# Patient Record
Sex: MALE | Race: WHITE | Employment: UNEMPLOYED | URBAN - METROPOLITAN AREA
[De-identification: names, ages, dates, MRNs, and addresses within clinical notes are randomized per-mention and may not be internally consistent; named-entity substitution may affect disease eponyms.]

---

## 2019-09-08 VITALS
DIASTOLIC BLOOD PRESSURE: 91 MMHG | HEART RATE: 103 BPM | WEIGHT: 220 LBS | BODY MASS INDEX: 27.35 KG/M2 | SYSTOLIC BLOOD PRESSURE: 138 MMHG | RESPIRATION RATE: 17 BRPM | HEIGHT: 75 IN | OXYGEN SATURATION: 99 % | TEMPERATURE: 97.8 F

## 2019-09-08 ASSESSMENT — PAIN SCALES - GENERAL: PAINLEVEL_OUTOF10: 8

## 2019-09-09 ENCOUNTER — HOSPITAL ENCOUNTER (EMERGENCY)
Age: 24
Discharge: HOME | End: 2019-09-09

## 2019-09-09 DIAGNOSIS — K02.9 PAIN DUE TO DENTAL CARIES: Primary | ICD-10-CM

## 2019-09-09 PROCEDURE — 6370000000 HC RX 637 (ALT 250 FOR IP): Performed by: PHYSICIAN ASSISTANT

## 2019-09-09 PROCEDURE — 99282 EMERGENCY DEPT VISIT SF MDM: CPT

## 2019-09-09 RX ORDER — NAPROXEN 500 MG/1
500 TABLET ORAL 2 TIMES DAILY
Qty: 60 TABLET | Refills: 0 | Status: SHIPPED | OUTPATIENT
Start: 2019-09-09

## 2019-09-09 RX ORDER — PENICILLIN V POTASSIUM 500 MG/1
500 TABLET ORAL ONCE
Status: COMPLETED | OUTPATIENT
Start: 2019-09-09 | End: 2019-09-09

## 2019-09-09 RX ORDER — PENICILLIN V POTASSIUM 500 MG/1
500 TABLET ORAL 4 TIMES DAILY
Qty: 40 TABLET | Refills: 0 | Status: SHIPPED | OUTPATIENT
Start: 2019-09-09 | End: 2019-09-19

## 2019-09-09 RX ORDER — LIDOCAINE HYDROCHLORIDE 20 MG/ML
5 SOLUTION OROPHARYNGEAL PRN
Qty: 100 ML | Refills: 0 | Status: SHIPPED | OUTPATIENT
Start: 2019-09-09 | End: 2021-02-08

## 2019-09-09 RX ORDER — CHLORHEXIDINE GLUCONATE 0.12 MG/ML
15 RINSE ORAL 2 TIMES DAILY
Qty: 420 ML | Refills: 0 | Status: SHIPPED | OUTPATIENT
Start: 2019-09-09 | End: 2019-09-23

## 2019-09-09 RX ORDER — TRAMADOL HYDROCHLORIDE 50 MG/1
50 TABLET ORAL EVERY 4 HOURS PRN
Qty: 10 TABLET | Refills: 0 | Status: SHIPPED | OUTPATIENT
Start: 2019-09-09 | End: 2019-09-12

## 2019-09-09 RX ORDER — TRAMADOL HYDROCHLORIDE 50 MG/1
50 TABLET ORAL ONCE
Status: COMPLETED | OUTPATIENT
Start: 2019-09-09 | End: 2019-09-09

## 2019-09-09 RX ADMIN — TRAMADOL HYDROCHLORIDE 50 MG: 50 TABLET, FILM COATED ORAL at 01:41

## 2019-09-09 RX ADMIN — PENICILLIN V POTASIUM 500 MG: 500 TABLET OROPHARYNGEAL at 01:41

## 2019-09-09 ASSESSMENT — PAIN DESCRIPTION - FREQUENCY: FREQUENCY: CONTINUOUS

## 2019-09-09 ASSESSMENT — PAIN DESCRIPTION - LOCATION: LOCATION: FACE;JAW

## 2019-09-09 ASSESSMENT — PAIN DESCRIPTION - PAIN TYPE: TYPE: ACUTE PAIN

## 2019-09-09 ASSESSMENT — PAIN SCALES - GENERAL
PAINLEVEL_OUTOF10: 9
PAINLEVEL_OUTOF10: 9

## 2019-09-09 ASSESSMENT — PAIN DESCRIPTION - PROGRESSION: CLINICAL_PROGRESSION: NOT CHANGED

## 2019-09-09 ASSESSMENT — PAIN DESCRIPTION - ORIENTATION: ORIENTATION: RIGHT;LOWER;UPPER

## 2019-09-09 ASSESSMENT — PAIN DESCRIPTION - ONSET: ONSET: AWAKENED FROM SLEEP

## 2019-09-09 ASSESSMENT — PAIN DESCRIPTION - DESCRIPTORS: DESCRIPTORS: CONSTANT

## 2019-09-09 NOTE — ED TRIAGE NOTES
Pt presents to ED with c/o left sided facial pain. Pt states the pain started in his gums and now his face hurts.

## 2019-09-09 NOTE — ED PROVIDER NOTES
eMERGENCY dEPARTMENT eNCOUnter      PCP: Flo Srivastava MD    [facility]    Chief Complaint   Patient presents with    Facial Pain     Pt was not seen by physician    HPI    Shameka Higginbotham is a 26 y.o. male who presents with left upper gingival and dental pain ongoing the last 2 to 3 days. Patient reports that he was seen in the dental clinic a few months ago and told that he would need to have a couple teeth to the left upper jaw was removed. He was placed on antibiotics at that time. Never followed up. Denies fever, facial redness or swelling. REVIEW OF SYSTEMS    General: Denies Fever, Denies Chills, Denies syncope  ENT:  No tongue or lip swelling, No difficulty swallowing,   Respiratory: Denies difficulty breathing, wheezes. GI: Denies nausea, vomiting. Lymphatics:  No swollen nodes, red streaks  Skin:  See hpi. No rash. All other review of systems are negative  See HPI and nursing notes for additional information     PAST MEDICAL & SURGICAL HISTORY    Past Medical History:   Diagnosis Date    Asthma      History reviewed. No pertinent surgical history. CURRENT MEDICATIONS    Current Outpatient Rx   Medication Sig Dispense Refill    ondansetron (ZOFRAN ODT) 4 MG disintegrating tablet Take 1 tablet by mouth every 6 hours 10 tablet 0    acetaminophen (APAP EXTRA STRENGTH) 500 MG tablet Take 1 tablet by mouth every 6 hours as needed for Pain 30 tablet 0    omeprazole (PRILOSEC) 20 MG capsule Take 1 capsule by mouth daily for 30 days. 30 capsule 0    ALBUTEROL IN Inhale  into the lungs.          ALLERGIES    No Known Allergies    SOCIAL & FAMILY HISTORY    Social History     Socioeconomic History    Marital status: Single     Spouse name: None    Number of children: None    Years of education: None    Highest education level: None   Occupational History    None   Social Needs    Financial resource strain: None    Food insecurity:     Worry: None     Inability: None    Transportation needs:     Medical: None     Non-medical: None   Tobacco Use    Smoking status: Current Every Day Smoker     Packs/day: 0.50     Types: Cigarettes    Smokeless tobacco: Never Used   Substance and Sexual Activity    Alcohol use: No    Drug use: No    Sexual activity: None   Lifestyle    Physical activity:     Days per week: None     Minutes per session: None    Stress: None   Relationships    Social connections:     Talks on phone: None     Gets together: None     Attends Church service: None     Active member of club or organization: None     Attends meetings of clubs or organizations: None     Relationship status: None    Intimate partner violence:     Fear of current or ex partner: None     Emotionally abused: None     Physically abused: None     Forced sexual activity: None   Other Topics Concern    None   Social History Narrative    None     History reviewed. No pertinent family history. PHYSICAL EXAM    VITAL SIGNS: BP (!) 138/91   Pulse 103   Temp 97.8 °F (36.6 °C) (Oral)   Resp 17   Ht 6' 3\" (1.905 m)   Wt 220 lb (99.8 kg)   SpO2 99%   BMI 27.50 kg/m²    Constitutional:  Well developed, well nourished, no acute distress   HENT:  Atraumatic, moist mucus membranes. EOMI. No proptosis. Ear canals and TMs clear bilateral.  There is no maxillary sinus tenderness to percussion or redness/warmth. Neck/Lymphatics: supple, no JVD, no swollen nodes   Musculoskeletal:  No edema, no deformities  Integument:  Skin warm and dry, no petechiae   Neurologic:  Alert & oriented, no slurred speech  Psych: Pleasant affect, no hallucinations    Dental:   Left  Lower / Upper  molars with evidence of caries, decay. Gingival buccal interface without obvious mass or discoloration, or fluctuance to palpation. No sublingual swelling or masses   No submandibular swelling. No facial swelling or redness  Oropharynx:    Posterior pharynx without swelling, tonsillar hypertrophy.   No sublingual swelling. ED COURSE & MEDICAL DECISION MAKING       Vital signs and nursing notes reviewed during ED course. I have independently evaluated this patient . Supervising MD present in the Emergency Department, available for consultation, throughout entirety of  patient care. All pertinent Lab data and radiographic results reviewed with patient at bedside. The patient and/or the family were informed of the results of any tests/labs/imaging, the treatment plan, and time was allotted to answer questions. Differential Diagnosis: dental pain, dental abscess, caries, peridontal disease, necrotic teeth, Ludwigs angina, bacteremia/spesis        Clinical  IMPRESSION    1. Pain due to dental caries        There are no signs of abscess or facial cellulitis on exam today. I recommend patient to follow up with dentist soon as possible. Today I provided patient with a prescription of Ultram &  Amoxicillin / Clindamycin. I also provided viscous lidocaine for topical application with cotton balls. I also provided Peridex oral solution. Return to emergency Department warning signs discussed in detail with patient who understands and agrees, including but not limited to difficulty swallowing, increased jaw swelling, fever, increased pain, or any new symptoms. Comment: Please note this report has been produced using speech recognition software and may contain errors related to that system including errors in grammar, punctuation, and spelling, as well as words and phrases that may be inappropriate. If there are any questions or concerns please feel free to contact the dictating provider for clarification.       Carmelina Bearden PA-C  09/09/19 6608

## 2020-11-05 ENCOUNTER — HOSPITAL ENCOUNTER (EMERGENCY)
Age: 25
Discharge: PSYCHIATRIC OR SUBSTANCE USE FACILITY | End: 2020-11-06
Attending: EMERGENCY MEDICINE
Payer: MEDICAID

## 2020-11-05 DIAGNOSIS — R45.851 SUICIDAL IDEATION: ICD-10-CM

## 2020-11-05 DIAGNOSIS — T39.1X2A TYLENOL OVERDOSE, INTENTIONAL SELF-HARM, INITIAL ENCOUNTER (HCC): Primary | ICD-10-CM

## 2020-11-05 DIAGNOSIS — E87.1 HYPONATREMIA: ICD-10-CM

## 2020-11-05 DIAGNOSIS — R73.9 HYPERGLYCEMIA: ICD-10-CM

## 2020-11-05 LAB
ACETAMINOPHEN LEVEL: 28.6 UG/ML (ref 15–30)
ACETAMINOPHEN LEVEL: 70.2 UG/ML (ref 15–30)
ALBUMIN SERPL-MCNC: 3.9 GM/DL (ref 3.4–5)
ALCOHOL SCREEN SERUM: <0.01 %WT/VOL
ALP BLD-CCNC: 108 IU/L (ref 40–129)
ALT SERPL-CCNC: 10 U/L (ref 10–40)
AMPHETAMINES: NEGATIVE
ANION GAP SERPL CALCULATED.3IONS-SCNC: 14 MMOL/L (ref 4–16)
ANION GAP SERPL CALCULATED.3IONS-SCNC: 15 MMOL/L (ref 4–16)
AST SERPL-CCNC: 9 IU/L (ref 15–37)
BARBITURATE SCREEN URINE: NEGATIVE
BENZODIAZEPINE SCREEN, URINE: NEGATIVE
BILIRUB SERPL-MCNC: 0.5 MG/DL (ref 0–1)
BUN BLDV-MCNC: 11 MG/DL (ref 6–23)
BUN BLDV-MCNC: 13 MG/DL (ref 6–23)
CALCIUM SERPL-MCNC: 9 MG/DL (ref 8.3–10.6)
CALCIUM SERPL-MCNC: 9 MG/DL (ref 8.3–10.6)
CANNABINOID SCREEN URINE: NEGATIVE
CHLORIDE BLD-SCNC: 88 MMOL/L (ref 99–110)
CHLORIDE BLD-SCNC: 94 MMOL/L (ref 99–110)
CO2: 21 MMOL/L (ref 21–32)
CO2: 21 MMOL/L (ref 21–32)
COCAINE METABOLITE: NEGATIVE
CREAT SERPL-MCNC: 0.7 MG/DL (ref 0.9–1.3)
CREAT SERPL-MCNC: 0.7 MG/DL (ref 0.9–1.3)
DOSE AMOUNT: ABNORMAL
DOSE AMOUNT: ABNORMAL
DOSE AMOUNT: NORMAL
DOSE TIME: ABNORMAL
DOSE TIME: ABNORMAL
DOSE TIME: NORMAL
GFR AFRICAN AMERICAN: >60 ML/MIN/1.73M2
GFR AFRICAN AMERICAN: >60 ML/MIN/1.73M2
GFR NON-AFRICAN AMERICAN: >60 ML/MIN/1.73M2
GFR NON-AFRICAN AMERICAN: >60 ML/MIN/1.73M2
GLUCOSE BLD-MCNC: 363 MG/DL (ref 70–99)
GLUCOSE BLD-MCNC: 426 MG/DL (ref 70–99)
HCG QUALITATIVE: NEGATIVE
HCT VFR BLD CALC: 46.7 % (ref 42–52)
HEMOGLOBIN: 16.7 GM/DL (ref 13.5–18)
MCH RBC QN AUTO: 29.5 PG (ref 27–31)
MCHC RBC AUTO-ENTMCNC: 35.8 % (ref 32–36)
MCV RBC AUTO: 82.5 FL (ref 78–100)
OPIATES, URINE: NEGATIVE
OXYCODONE: NEGATIVE
PDW BLD-RTO: 11.3 % (ref 11.7–14.9)
PHENCYCLIDINE, URINE: NEGATIVE
PLATELET # BLD: 303 K/CU MM (ref 140–440)
PMV BLD AUTO: 10.3 FL (ref 7.5–11.1)
POTASSIUM SERPL-SCNC: 3.7 MMOL/L (ref 3.5–5.1)
POTASSIUM SERPL-SCNC: 3.8 MMOL/L (ref 3.5–5.1)
PRO-BNP: 37.11 PG/ML
RBC # BLD: 5.66 M/CU MM (ref 4.6–6.2)
SALICYLATE LEVEL: <0.3 MG/DL (ref 15–30)
SODIUM BLD-SCNC: 124 MMOL/L (ref 135–145)
SODIUM BLD-SCNC: 129 MMOL/L (ref 135–145)
TOTAL PROTEIN: 7.1 GM/DL (ref 6.4–8.2)
WBC # BLD: 18 K/CU MM (ref 4–10.5)

## 2020-11-05 PROCEDURE — 93005 ELECTROCARDIOGRAM TRACING: CPT | Performed by: EMERGENCY MEDICINE

## 2020-11-05 PROCEDURE — 84703 CHORIONIC GONADOTROPIN ASSAY: CPT

## 2020-11-05 PROCEDURE — 80053 COMPREHEN METABOLIC PANEL: CPT

## 2020-11-05 PROCEDURE — G0480 DRUG TEST DEF 1-7 CLASSES: HCPCS

## 2020-11-05 PROCEDURE — 80307 DRUG TEST PRSMV CHEM ANLYZR: CPT

## 2020-11-05 PROCEDURE — 83880 ASSAY OF NATRIURETIC PEPTIDE: CPT

## 2020-11-05 PROCEDURE — 80048 BASIC METABOLIC PNL TOTAL CA: CPT

## 2020-11-05 PROCEDURE — 85027 COMPLETE CBC AUTOMATED: CPT

## 2020-11-05 PROCEDURE — U0002 COVID-19 LAB TEST NON-CDC: HCPCS

## 2020-11-05 PROCEDURE — 2580000003 HC RX 258: Performed by: EMERGENCY MEDICINE

## 2020-11-05 PROCEDURE — 99285 EMERGENCY DEPT VISIT HI MDM: CPT

## 2020-11-05 PROCEDURE — 6370000000 HC RX 637 (ALT 250 FOR IP): Performed by: EMERGENCY MEDICINE

## 2020-11-05 RX ORDER — 0.9 % SODIUM CHLORIDE 0.9 %
1000 INTRAVENOUS SOLUTION INTRAVENOUS ONCE
Status: COMPLETED | OUTPATIENT
Start: 2020-11-05 | End: 2020-11-05

## 2020-11-05 RX ORDER — ACTIVATED CHARCOAL 208 MG/ML
30 SUSPENSION ORAL ONCE
Status: COMPLETED | OUTPATIENT
Start: 2020-11-05 | End: 2020-11-05

## 2020-11-05 RX ADMIN — SODIUM CHLORIDE 1000 ML: 9 INJECTION, SOLUTION INTRAVENOUS at 17:07

## 2020-11-05 RX ADMIN — ACTIVATED CHARCOAL 30 G: 208 SUSPENSION ORAL at 15:38

## 2020-11-05 ASSESSMENT — PAIN SCALES - GENERAL: PAINLEVEL_OUTOF10: 7

## 2020-11-05 ASSESSMENT — PAIN DESCRIPTION - DESCRIPTORS: DESCRIPTORS: ACHING

## 2020-11-05 ASSESSMENT — PAIN DESCRIPTION - LOCATION: LOCATION: HEAD

## 2020-11-05 NOTE — ED NOTES
Bed: ED-29  Expected date:   Expected time:   Means of arrival:   Comments:  Medic suicidal     Lisbeth Anton RN  11/05/20 1079

## 2020-11-05 NOTE — ED NOTES
36 tylenol caplets missing from tylenol bottle.  [id number] taken if pt took 36 - 500 mg tylenol caplets     Mauricio Connelly RN  11/05/20 7627

## 2020-11-05 NOTE — ED NOTES
Pt resting in ED cart peacefully with eyes closed. No acute distress. Sitter at bedside for safety. RN will cont. To monitor.      Cait Trujillo RN  11/05/20 4750

## 2020-11-05 NOTE — ED NOTES
Pt resting comfortably on ED cart. Denies needs. Sitter at bedside for safety. RN will cont. To monitor.      Juan Miller RN  11/05/20 4750

## 2020-11-05 NOTE — ED NOTES
Pt resting on ED cart without any needs or complaints at this time. Sitter remains at bedside for safety at this time. RN will cont. To monitor.      Valentino Bain RN  11/05/20 0116

## 2020-11-05 NOTE — ED PROVIDER NOTES
Triage Chief Complaint:   Suicidal and Drug Overdose (approx 50 - 500 mg acetaminophen caplets take approx 20 minutes PTA)      [place]:  Gt Mays is a 23 y.o. male that presents after he attempted to kill himself by overdosing on Tylenol about 20 minutes prior to arrival which would have been about 3 PM today. He states he took about half bottle of acetaminophen tablet. He states that he has had depression previously but never attempted to kill himself. He states he recently broke up with his girlfriend and spiraled out of control this afternoon. He states he has a mild headache but denies any other somatic, vomiting or abdominal pain. ROS:  General:  No fevers  Eyes:  No recent vison changes  ENT:  No sore throat, no nasal congestion  Cardiovascular:  No chest pain, no palpitations  Respiratory:  No shortness of breath  Gastrointestinal:  No pain, no nausea, no vomiting, no diarrhea  Musculoskeletal:  No muscle pain  Skin:  No rash  Neurologic:  no headache  Psychiatric:  No anxiety, + depression, +suicidal ideation, suicide attempt  Genitourinary:  No dysuria  Endocrine:  No unexpected weight gain, no unexpected weight loss  Extremities:  no edema, no pain    Past Medical History:   Diagnosis Date    Asthma      History reviewed. No pertinent surgical history. History reviewed. No pertinent family history.   Social History     Socioeconomic History    Marital status: Single     Spouse name: Not on file    Number of children: Not on file    Years of education: Not on file    Highest education level: Not on file   Occupational History    Not on file   Social Needs    Financial resource strain: Not on file    Food insecurity     Worry: Not on file     Inability: Not on file    Transportation needs     Medical: Not on file     Non-medical: Not on file   Tobacco Use    Smoking status: Current Every Day Smoker     Packs/day: 1.00     Years: 2.00     Pack years: 2.00     Types: Cigarettes    Smokeless tobacco: Never Used   Substance and Sexual Activity    Alcohol use: No    Drug use: No    Sexual activity: Not on file   Lifestyle    Physical activity     Days per week: Not on file     Minutes per session: Not on file    Stress: Not on file   Relationships    Social connections     Talks on phone: Not on file     Gets together: Not on file     Attends Zoroastrian service: Not on file     Active member of club or organization: Not on file     Attends meetings of clubs or organizations: Not on file     Relationship status: Not on file    Intimate partner violence     Fear of current or ex partner: Not on file     Emotionally abused: Not on file     Physically abused: Not on file     Forced sexual activity: Not on file   Other Topics Concern    Not on file   Social History Narrative    Not on file     No current facility-administered medications for this encounter. Current Outpatient Medications   Medication Sig Dispense Refill    naproxen (NAPROSYN) 500 MG tablet Take 1 tablet by mouth 2 times daily 60 tablet 0    lidocaine viscous hcl (XYLOCAINE) 2 % SOLN solution Take 5 mLs by mouth as needed for Irritation or Dental Pain 100 mL 0    acetaminophen (APAP EXTRA STRENGTH) 500 MG tablet Take 1 tablet by mouth every 6 hours as needed for Pain 30 tablet 0     No Known Allergies    Nursing Notes Reviewed    Physical Exam:  ED Triage Vitals   Enc Vitals Group      BP 11/05/20 1526 135/86      Pulse 11/05/20 1526 125      Resp 11/05/20 1526 18      Temp 11/05/20 1527 98.3 °F (36.8 °C)      Temp Source 11/05/20 1527 Oral      SpO2 11/05/20 1526 96 %      Weight 11/05/20 1526 235 lb (106.6 kg)      Height 11/05/20 1526 6' 3\" (1.905 m)      Head Circumference --       Peak Flow --       Pain Score --       Pain Loc --       Pain Edu? --       Excl. in [address]? --        General appearance:  No acute distress. Skin:  Warm. Dry. Eye:  Extraocular movements intact.      Ears, nose, mouth and throat:  Oral mucosa moist   Neck:  Trachea midline. Extremity: Normal ROM     Heart:  Regular  Perfusion:  intact  Respiratory:   Respirations nonlabored. Abdominal:  Non distended. Neurological:  Alert and oriented times 3. No focal neuro deficits. Psychiatric:  Flat, + depression, + suicidal ideations, no homicidal ideations    I have reviewed and interpreted all of the currently available lab results from this visit (if applicable):  Results for orders placed or performed during the hospital encounter of 11/05/20   Urine Drug Screen   Result Value Ref Range    Cannabinoid Scrn, Ur NEGATIVE NEGATIVE    Amphetamines NEGATIVE NEGATIVE    Cocaine Metabolite NEGATIVE NEGATIVE    Benzodiazepine Screen, Urine NEGATIVE NEGATIVE    Barbiturate Screen, Ur NEGATIVE NEGATIVE    Opiates, Urine NEGATIVE NEGATIVE    Phencyclidine, Urine NEGATIVE NEGATIVE    Oxycodone NEGATIVE NEGATIVE   Salicylate   Result Value Ref Range    Salicylate Lvl <5.9 (L) 15 - 30 MG/DL    DOSE AMOUNT DOSE AMT. GIVEN - UNKNOWN     DOSE TIME DOSE TIME GIVEN - UNKNOWN    HCG Qualitative, Serum   Result Value Ref Range    hCG Qual NEGATIVE    Ethanol   Result Value Ref Range    Alcohol Scrn <0.01 <0.01 %WT/VOL   Acetaminophen Level   Result Value Ref Range    Acetaminophen Level 70.2 (HH) 15 - 30 ug/ml    DOSE AMOUNT DOSE AMT.  GIVEN - UNKNOWN     DOSE TIME DOSE TIME GIVEN - UNKNOWN    CBC   Result Value Ref Range    WBC 18.0 (H) 4.0 - 10.5 K/CU MM    RBC 5.66 4.6 - 6.2 M/CU MM    Hemoglobin 16.7 13.5 - 18.0 GM/DL    Hematocrit 46.7 42 - 52 %    MCV 82.5 78 - 100 FL    MCH 29.5 27 - 31 PG    MCHC 35.8 32.0 - 36.0 %    RDW 11.3 (L) 11.7 - 14.9 %    Platelets 176 602 - 035 K/CU MM    MPV 10.3 7.5 - 11.1 FL   Comprehensive Metabolic Panel w/ Reflex to MG   Result Value Ref Range    Sodium 124 (L) 135 - 145 MMOL/L    Potassium 3.7 3.5 - 5.1 MMOL/L    Chloride 88 (L) 99 - 110 mMol/L    CO2 21 21 - 32 MMOL/L    BUN 13 6 - 23 MG/DL    CREATININE 0.7 (L) 0.9 - 1.3 MG/DL    Glucose 426 (HH) 70 - 99 MG/DL    Calcium 9.0 8.3 - 10.6 MG/DL    Alb 3.9 3.4 - 5.0 GM/DL    Total Protein 7.1 6.4 - 8.2 GM/DL    Total Bilirubin 0.5 0.0 - 1.0 MG/DL    ALT 10 10 - 40 U/L    AST 9 (L) 15 - 37 IU/L    Alkaline Phosphatase 108 40 - 129 IU/L    GFR Non-African American >60 >60 mL/min/1.73m2    GFR African American >60 >60 mL/min/1.73m2    Anion Gap 15 4 - 16   Acetaminophen Level   Result Value Ref Range    Acetaminophen Level 28.6 15 - 30 ug/ml    DOSE AMOUNT DOSE AMT. GIVEN - UNKNOWN     DOSE TIME DOSE TIME GIVEN - UNKNOWN    Brain Natriuretic Peptide   Result Value Ref Range    Pro-BNP 37.11 <300 PG/ML   Basic Metabolic Panel w/ Reflex to MG   Result Value Ref Range    Sodium 129 (L) 135 - 145 MMOL/L    Potassium 3.8 3.5 - 5.1 MMOL/L    Chloride 94 (L) 99 - 110 mMol/L    CO2 21 21 - 32 MMOL/L    Anion Gap 14 4 - 16    BUN 11 6 - 23 MG/DL    CREATININE 0.7 (L) 0.9 - 1.3 MG/DL    Glucose 363 (H) 70 - 99 MG/DL    Calcium 9.0 8.3 - 10.6 MG/DL    GFR Non-African American >60 >60 mL/min/1.73m2    GFR African American >60 >60 mL/min/1.73m2        Radiographs (if obtained):  [] The following radiograph was interpreted by myself in the absence of a radiologist:   [] Radiologist's Report Reviewed:  No orders to display         EKG (if obtained): (All EKG's are interpreted by myself in the absence of a cardiologist)  Sinus tachycardia rate of 111. WI interval 146, QRS 94, QTc 437. No ST elevations or depressions. Normal T waves. Impression: Sinus tachycardia, otherwise normal EKG. No previous EKGs for comparison. Chart review shows recent radiographs:  No results found. MDM:  Differential Diagnosis considered:   Depression, bipolar disorder, substance abuse, hypothyroidism    Plan:  Suicide precautions  Urine studies  CBC, chemistry  ASA, Tylenol levels  Consult Behavioral Health  EKG   Consult with poison control.      ED Course/MDM:   Patient arrived hemodynamically stable and in no acute distress. The patient was placed in suicide precautions, patient's clothing and belongings were removed, documented and stored in the emergency department. Patient's previous imaging and studies reviewed. Patient's workup was initiated. I spoke with Jeannie at poison control who recommended giving activated charcoal as obtaining labs and EKG. Lab results as above, at this point patient is medically cleared. Patient's history and physical exam did not reveal concern for underlying medical etiology of her symptoms. Will review the above studies and if no abnormalities present patient is medically cleared for psychiatry evaluation. 7:59 PM EST  4-hour Tylenol level is 28.6. Patient is medically cleared from a Tylenol overdose perspective. He was given IV fluids and will repeat his chemistry due to the hyponatremia and elevated glucose level. Continue to await urine drug screen. 9:20 PM EST   Repeat chemistry shows improvement in his sodium level and glucose level. Urine drug screen is negative. At this point in time patient is medically cleared for mental health crisis evaluation. ED mental crisis evaluation and recommendations. 11PM  I have signed out [address] Emergency Department care to Dr. Michael Fournier. We discussed the pertinent history, physical exam, completed/pending test results (if applicable) and current treatment plan. Please refer to his/her chart for the patients remaining Emergency Department course and final disposition. I did don appropriate PPE (including N95 face mask, protective eye ware/safety glasses, gloves, hair covering, and no isolation gown), as recommended by the health facility/national standard best practice, during my bedside interactions with the patient. Clinical Impression:  1. Tylenol overdose, intentional self-harm, initial encounter ([facility] [address]    2. Suicidal ideation    3. Hyponatremia    4.  Hyperglycemia      Disposition referral (if applicable):  No follow-up provider specified. Disposition medications (if applicable):  New Prescriptions    No medications on file       Comment: Please note this report has been produced using speech recognition software and may contain errors related to that system including errors in grammar, punctuation, and spelling, as well as words and phrases that may be inappropriate. If there are any questions or concerns please feel free to contact the dictating provider for clarification.         Philly Orantes MD  11/05/20 2537

## 2020-11-05 NOTE — ED NOTES
Pt presents to ED via EMS for c/o suicidal ideation. Pt reports he has been dealing with anxiety and depression for several months but has not received an official diagnosis or seen a mental health specialist. Pt states he has had suicidal ideation in the past but never acted on these thoughts until today. Reports his ex girlfriend expressed to him today that she no longer had feelings for him and that sent him over the edge. Denies a/v hallucinations. Denies HI. Pt is calm and cooperative. CCM in place d/t taking excessive amounts of tylenol. Sitter is at the bedside and suicide precautions are in place for pt safety. Pt changed into green gown by ELICEO Lewis and security notified to inventory belongings. Pt denies further needs or complains at this time. Sitter at bedside. Will cont. To monitor.      Zach Beauchamp RN  11/05/20 9143

## 2020-11-06 VITALS
OXYGEN SATURATION: 97 % | RESPIRATION RATE: 20 BRPM | WEIGHT: 235 LBS | HEART RATE: 72 BPM | TEMPERATURE: 98 F | DIASTOLIC BLOOD PRESSURE: 66 MMHG | SYSTOLIC BLOOD PRESSURE: 132 MMHG | BODY MASS INDEX: 29.22 KG/M2 | HEIGHT: 75 IN

## 2020-11-06 LAB
SARS-COV-2, NAAT: NOT DETECTED
SOURCE: NORMAL

## 2020-11-06 PROCEDURE — 93010 ELECTROCARDIOGRAM REPORT: CPT | Performed by: INTERNAL MEDICINE

## 2020-11-06 PROCEDURE — 6370000000 HC RX 637 (ALT 250 FOR IP): Performed by: EMERGENCY MEDICINE

## 2020-11-06 PROCEDURE — 94761 N-INVAS EAR/PLS OXIMETRY MLT: CPT

## 2020-11-06 RX ORDER — NICOTINE 21 MG/24HR
1 PATCH, TRANSDERMAL 24 HOURS TRANSDERMAL DAILY
Status: DISCONTINUED | OUTPATIENT
Start: 2020-11-06 | End: 2020-11-06

## 2020-11-06 RX ORDER — NICOTINE 21 MG/24HR
1 PATCH, TRANSDERMAL 24 HOURS TRANSDERMAL DAILY
Status: DISCONTINUED | OUTPATIENT
Start: 2020-11-06 | End: 2020-11-06 | Stop reason: HOSPADM

## 2020-11-06 NOTE — ED PROVIDER NOTES
Emergency Department Encounter    Patient: Katelynn Lawson  MRN: [id number]  : 1995  Date of Evaluation: 2020  ED Provider:  Qi Cuello     Patient was signed out to me by Dr. Jonathan Vaughn at 2252    Briefly, Katelynn Lawson is a 23 y.o. male presented to the emergency department after intentional overdose of acetaminophen. Patient did receive charcoal and his 4-hour acetaminophen level was 28. Patient was hyponatremic and hyperglycemic on laboratory analysis. The off going provider has medically cleared him and the patient is awaiting a mental health evaluation. Plan at the end of the off going providers shift was for the patient to be placed for further psychiatric care. I have reviewed and interpreted all of the currently available lab results from this visit (if applicable)  Results for orders placed or performed during the hospital encounter of 20   Urine Drug Screen   Result Value Ref Range    Cannabinoid Scrn, Ur NEGATIVE NEGATIVE    Amphetamines NEGATIVE NEGATIVE    Cocaine Metabolite NEGATIVE NEGATIVE    Benzodiazepine Screen, Urine NEGATIVE NEGATIVE    Barbiturate Screen, Ur NEGATIVE NEGATIVE    Opiates, Urine NEGATIVE NEGATIVE    Phencyclidine, Urine NEGATIVE NEGATIVE    Oxycodone NEGATIVE NEGATIVE   Salicylate   Result Value Ref Range    Salicylate Lvl <0.6 (L) 15 - 30 MG/DL    DOSE AMOUNT DOSE AMT. GIVEN - UNKNOWN     DOSE TIME DOSE TIME GIVEN - UNKNOWN    HCG Qualitative, Serum   Result Value Ref Range    hCG Qual NEGATIVE    Ethanol   Result Value Ref Range    Alcohol Scrn <0.01 <0.01 %WT/VOL   Acetaminophen Level   Result Value Ref Range    Acetaminophen Level 70.2 (HH) 15 - 30 ug/ml    DOSE AMOUNT DOSE AMT.  GIVEN - UNKNOWN     DOSE TIME DOSE TIME GIVEN - UNKNOWN    CBC   Result Value Ref Range    WBC 18.0 (H) 4.0 - 10.5 K/CU MM    RBC 5.66 4.6 - 6.2 M/CU MM    Hemoglobin 16.7 13.5 - 18.0 GM/DL    Hematocrit 46.7 42 - 52 %    MCV 82.5 78 - 100 FL    MCH 29.5 27 - 31 PG MCHC 35.8 32.0 - 36.0 %    RDW 11.3 (L) 11.7 - 14.9 %    Platelets 461 887 - 062 K/CU MM    MPV 10.3 7.5 - 11.1 FL   Comprehensive Metabolic Panel w/ Reflex to MG   Result Value Ref Range    Sodium 124 (L) 135 - 145 MMOL/L    Potassium 3.7 3.5 - 5.1 MMOL/L    Chloride 88 (L) 99 - 110 mMol/L    CO2 21 21 - 32 MMOL/L    BUN 13 6 - 23 MG/DL    CREATININE 0.7 (L) 0.9 - 1.3 MG/DL    Glucose 426 (HH) 70 - 99 MG/DL    Calcium 9.0 8.3 - 10.6 MG/DL    Alb 3.9 3.4 - 5.0 GM/DL    Total Protein 7.1 6.4 - 8.2 GM/DL    Total Bilirubin 0.5 0.0 - 1.0 MG/DL    ALT 10 10 - 40 U/L    AST 9 (L) 15 - 37 IU/L    Alkaline Phosphatase 108 40 - 129 IU/L    GFR Non-African American >60 >60 mL/min/1.73m2    GFR African American >60 >60 mL/min/1.73m2    Anion Gap 15 4 - 16   Acetaminophen Level   Result Value Ref Range    Acetaminophen Level 28.6 15 - 30 ug/ml    DOSE AMOUNT DOSE AMT. GIVEN - UNKNOWN     DOSE TIME DOSE TIME GIVEN - UNKNOWN    Brain Natriuretic Peptide   Result Value Ref Range    Pro-BNP 37.11 <300 PG/ML   Basic Metabolic Panel w/ Reflex to MG   Result Value Ref Range    Sodium 129 (L) 135 - 145 MMOL/L    Potassium 3.8 3.5 - 5.1 MMOL/L    Chloride 94 (L) 99 - 110 mMol/L    CO2 21 21 - 32 MMOL/L    Anion Gap 14 4 - 16    BUN 11 6 - 23 MG/DL    CREATININE 0.7 (L) 0.9 - 1.3 MG/DL    Glucose 363 (H) 70 - 99 MG/DL    Calcium 9.0 8.3 - 10.6 MG/DL    GFR Non-African American >60 >60 mL/min/1.73m2    GFR African American >60 >60 mL/min/1.73m2      Radiographs (if obtained):    [] Radiologist's Report Reviewed:  No orders to display       MDM:  27-year-old male with SI; status post intentional Tylenol ingestion. 4-hour Tylenol level was not at therapeutic range. Patient was medically cleared by the off going provider and accepted at [facility]; scheduled to be transferred @0900        Clinical Impression:  1. Tylenol overdose, intentional self-harm, initial encounter ([facility] [address]    2. Suicidal ideation    3. Hyponatremia    4.  Hyperglycemia Disposition referral (if applicable):  No follow-up provider specified. Disposition medications (if applicable):  New Prescriptions    No medications on file       Comment: Please note this report has been produced using speech recognition software and may contain errors related to that system including errors in grammar, punctuation, and spelling, as well as words and phrases that may be inappropriate. Efforts were made to edit the dictations.         Dorothea Hong MD  11/06/20 5676

## 2020-11-06 NOTE — ED NOTES
Attempted to contact [facility] for nurse to nurse, staff was unable to locate a nurse at this time, Jenn Robledo reported he would have one of the nurses call me back when they returned to unit     Lilly Carvajal RN  11/06/20 0731

## 2020-11-06 NOTE — ED NOTES
Patient ambulated to bathroom accompanied by .  Patient returned to bed.  Patient watching television.         Doyle Guerrero LPN  88/56/23 9673

## 2020-11-06 NOTE — ED NOTES
Patient ambulated to bathroom accompanied by . Patient returned to bed. Patient watching television.            Nahed Andrade LPN  75/24/04 0395

## 2020-11-06 NOTE — ED NOTES
Patient laying in bed eyes closed, respirations even and unlabored no distress. Patients skin warm, dry, intact and to ethnicity. No needs stated at this time. 1:1 sitter at patients bedside     Anna Cesar  11/05/20 5880

## 2020-11-06 NOTE — ED NOTES
This nurse received pt care at this time. Pt is sitting in bed. Sitter at bedside.  This nurse gave pt a safety meal.      Mary Carmen Atkinson RN  11/06/20 6828

## 2020-11-06 NOTE — ED NOTES
Ambulating patient to restroom, with no distress present. Return to room 29 no needs at this time. Placed back on the monitor and 1:! Sitter remains at bedside     [facility].  Tali  11/06/20 0015

## 2020-11-06 NOTE — ED NOTES
Spoke with Madie Harp RN from [facility], beds available, chart faxed for review      Maximo Bryson RN  11/06/20 6576

## 2020-11-06 NOTE — ED NOTES
Patient ambulated to bathroom accompanied by . Patient returned to bed. Eyes closed at present.       Henrietta Barney LPN  13/58/85 7674

## 2020-11-06 NOTE — ED NOTES
..Provisional Diagnosis:       Suicide attempt by overdose  Probable depression/anxiety  Poor sleep  Appears impulsive  Constant thoughts of death      Psychosocial and Contextual Factors:       Per Dr Vicky Bales ED physician,  Huyen Jeronimo is a 23 y.o. male that presents after he attempted to kill himself by overdosing on Tylenol about 20 minutes prior to arrival which would have been about 3 PM today. He states he took about half bottle of acetaminophen tablet. He states that he has had depression previously but never attempted to kill himself. He states he recently broke up with his girlfriend and spiraled out of control this afternoon. He states he has a mild headache but denies any other somatic, vomiting or abdominal pain. \"    Per Wilber Pennington ED RN,  \"Pt presents to ED via EMS for c/o suicidal ideation. Pt reports he has been dealing with anxiety and depression for several months but has not received an official diagnosis or seen a mental health specialist. Pt states he has had suicidal ideation in the past but never acted on these thoughts until today. Reports his ex girlfriend expressed to him today that she no longer had feelings for him and that sent him over the edge. Denies a/v hallucinations. Denies HI. Pt is calm and cooperative. CCM in place d/t taking excessive amounts of tylenol. Juve Kick Juve Kick Juve Kick \"    Pt presents disheveled, depressed, unable to maintain eye contact    Pt reported SI continues, stated has been overwhelmed with depression and anxiety for years and recent interactions with an ex \"sent me over the edge\"    Pt reported quitting a job he enjoyed recently, stated lacks motivation, is isolating himself, no longer finds interest in activities he once enjoyed,constant thoughts of death    Pt reported he is impulsive, stated feels hopeless, helpless     Pt denied HI intent or plan    Pt denied AVH    Pt reported poor sleep, stated appetite is poor    Limited insight/ poor judgement    Pt unable to assure safety    High risk for self harm    Discussed all above with Dr Conti Most ED physician who recommends involuntary psychiatric care for observation, evaluation and safety    Will seek placement          C-SSRS Summary:      Patient: As above  Family: No hx shared   Agency: None      Present Suicidal Behavior:      Verbal: As above    Attempt:As above    Past Suicidal Behavior:     Verbal:unknown    Attempt:unknown      Self-Injurious/Self-Mutilation: reported has hx of     Trauma Identified:  unknown      Protective Factors:    None identified at this time    Risk Factors:    Suicide attempt by overdose  Probable depression/anxiety  Poor sleep  Appears impulsive  Constant thoughts of death      Clinical Summary:    As above  Will seek placement     Electronically signed by Marguerite Cartagena RN on 11/6/2020 at 1:00 AM      Marguerite Cartagena RN  11/06/20 0130

## 2020-11-06 NOTE — ED NOTES
Patient ambulated to bathroom accompanied by . Patient returned to bed.  Patient watching television.      Doyle Guerrero LPN  [id number]

## 2020-11-06 NOTE — ED NOTES
Make pt aware we are waiting on a urine sample, gave pt something to drink     Jorge Alberto Coffey, JOSEPHINE  11/05/20 1937

## 2020-11-07 ENCOUNTER — HOSPITAL ENCOUNTER (OUTPATIENT)
Age: 25
Setting detail: SPECIMEN
Discharge: HOME | End: 2020-11-07
Payer: MEDICAID

## 2020-11-07 LAB
ALBUMIN SERPL-MCNC: 4.4 GM/DL (ref 3.4–5)
ALP BLD-CCNC: 102 IU/L (ref 40–128)
ALT SERPL-CCNC: 8 U/L (ref 10–40)
ANION GAP SERPL CALCULATED.3IONS-SCNC: 10 MMOL/L (ref 4–16)
AST SERPL-CCNC: 7 IU/L (ref 15–37)
BILIRUB SERPL-MCNC: 0.5 MG/DL (ref 0–1)
BUN BLDV-MCNC: 9 MG/DL (ref 6–23)
CALCIUM SERPL-MCNC: 10.2 MG/DL (ref 8.3–10.6)
CHLORIDE BLD-SCNC: 99 MMOL/L (ref 99–110)
CHOLESTEROL, FASTING: 159 MG/DL
CO2: 28 MMOL/L (ref 21–32)
CREAT SERPL-MCNC: 0.7 MG/DL (ref 0.9–1.3)
ESTIMATED AVERAGE GLUCOSE: 263 MG/DL
GFR AFRICAN AMERICAN: >60 ML/MIN/1.73M2
GFR NON-AFRICAN AMERICAN: >60 ML/MIN/1.73M2
GLUCOSE BLD-MCNC: 311 MG/DL (ref 70–99)
HBA1C MFR BLD: 10.8 % (ref 4.2–6.3)
HDLC SERPL-MCNC: 29 MG/DL
LDL CHOLESTEROL DIRECT: 95 MG/DL
POTASSIUM SERPL-SCNC: 4.3 MMOL/L (ref 3.5–5.1)
SODIUM BLD-SCNC: 137 MMOL/L (ref 135–145)
TOTAL PROTEIN: 7 GM/DL (ref 6.4–8.2)
TRIGLYCERIDE, FASTING: 230 MG/DL

## 2020-11-07 PROCEDURE — 80053 COMPREHEN METABOLIC PANEL: CPT

## 2020-11-07 PROCEDURE — 83036 HEMOGLOBIN GLYCOSYLATED A1C: CPT

## 2020-11-07 PROCEDURE — 80061 LIPID PANEL: CPT

## 2020-11-07 PROCEDURE — 36415 COLL VENOUS BLD VENIPUNCTURE: CPT

## 2020-11-10 LAB
EKG ATRIAL RATE: 111 BPM
EKG DIAGNOSIS: NORMAL
EKG P AXIS: 41 DEGREES
EKG P-R INTERVAL: 146 MS
EKG Q-T INTERVAL: 322 MS
EKG QRS DURATION: 94 MS
EKG QTC CALCULATION (BAZETT): 437 MS
EKG R AXIS: 26 DEGREES
EKG T AXIS: 40 DEGREES
EKG VENTRICULAR RATE: 111 BPM

## 2020-12-26 ENCOUNTER — HOSPITAL ENCOUNTER (EMERGENCY)
Age: 25
Discharge: HOME | End: 2020-12-27
Payer: MEDICAID

## 2020-12-26 ENCOUNTER — APPOINTMENT (OUTPATIENT)
Dept: GENERAL RADIOLOGY | Age: 25
End: 2020-12-26
Payer: MEDICAID

## 2020-12-26 PROCEDURE — 99283 EMERGENCY DEPT VISIT LOW MDM: CPT

## 2020-12-26 PROCEDURE — 85025 COMPLETE CBC W/AUTO DIFF WBC: CPT

## 2020-12-26 PROCEDURE — 74018 RADEX ABDOMEN 1 VIEW: CPT

## 2020-12-26 PROCEDURE — 80048 BASIC METABOLIC PNL TOTAL CA: CPT

## 2020-12-26 ASSESSMENT — PAIN SCALES - GENERAL: PAINLEVEL_OUTOF10: 8

## 2020-12-26 ASSESSMENT — PAIN DESCRIPTION - PAIN TYPE: TYPE: ACUTE PAIN

## 2020-12-26 ASSESSMENT — PAIN DESCRIPTION - LOCATION: LOCATION: ABDOMEN

## 2020-12-27 VITALS
DIASTOLIC BLOOD PRESSURE: 74 MMHG | RESPIRATION RATE: 16 BRPM | BODY MASS INDEX: 24.36 KG/M2 | TEMPERATURE: 97.6 F | HEIGHT: 76 IN | SYSTOLIC BLOOD PRESSURE: 139 MMHG | HEART RATE: 77 BPM | OXYGEN SATURATION: 100 % | WEIGHT: 200 LBS

## 2020-12-27 LAB
ANION GAP SERPL CALCULATED.3IONS-SCNC: 13 MMOL/L (ref 4–16)
BASOPHILS ABSOLUTE: 0.1 K/CU MM
BASOPHILS RELATIVE PERCENT: 0.5 % (ref 0–1)
BUN BLDV-MCNC: 6 MG/DL (ref 6–23)
CALCIUM SERPL-MCNC: 9.2 MG/DL (ref 8.3–10.6)
CHLORIDE BLD-SCNC: 99 MMOL/L (ref 99–110)
CO2: 21 MMOL/L (ref 21–32)
CREAT SERPL-MCNC: 0.6 MG/DL (ref 0.9–1.3)
DIFFERENTIAL TYPE: ABNORMAL
EOSINOPHILS ABSOLUTE: 0.2 K/CU MM
EOSINOPHILS RELATIVE PERCENT: 1.2 % (ref 0–3)
GFR AFRICAN AMERICAN: >60 ML/MIN/1.73M2
GFR NON-AFRICAN AMERICAN: >60 ML/MIN/1.73M2
GLUCOSE BLD-MCNC: 163 MG/DL (ref 70–99)
HCT VFR BLD CALC: 40.9 % (ref 42–52)
HEMOGLOBIN: 13.6 GM/DL (ref 13.5–18)
IMMATURE NEUTROPHIL %: 0.4 % (ref 0–0.43)
LYMPHOCYTES ABSOLUTE: 1.8 K/CU MM
LYMPHOCYTES RELATIVE PERCENT: 13.5 % (ref 24–44)
MCH RBC QN AUTO: 28 PG (ref 27–31)
MCHC RBC AUTO-ENTMCNC: 33.3 % (ref 32–36)
MCV RBC AUTO: 84.2 FL (ref 78–100)
MONOCYTES ABSOLUTE: 0.7 K/CU MM
MONOCYTES RELATIVE PERCENT: 5 % (ref 0–4)
NUCLEATED RBC %: 0 %
PDW BLD-RTO: 11.9 % (ref 11.7–14.9)
PLATELET # BLD: 288 K/CU MM (ref 140–440)
PMV BLD AUTO: 10.2 FL (ref 7.5–11.1)
POTASSIUM SERPL-SCNC: 3.6 MMOL/L (ref 3.5–5.1)
RBC # BLD: 4.86 M/CU MM (ref 4.6–6.2)
SEGMENTED NEUTROPHILS ABSOLUTE COUNT: 10.7 K/CU MM
SEGMENTED NEUTROPHILS RELATIVE PERCENT: 79.4 % (ref 36–66)
SODIUM BLD-SCNC: 133 MMOL/L (ref 135–145)
TOTAL IMMATURE NEUTOROPHIL: 0.06 K/CU MM
TOTAL NUCLEATED RBC: 0 K/CU MM
WBC # BLD: 13.5 K/CU MM (ref 4–10.5)

## 2020-12-27 PROCEDURE — 6370000000 HC RX 637 (ALT 250 FOR IP): Performed by: PHYSICIAN ASSISTANT

## 2020-12-27 RX ORDER — DOCUSATE SODIUM 100 MG/1
200 CAPSULE, LIQUID FILLED ORAL DAILY
Status: DISCONTINUED | OUTPATIENT
Start: 2020-12-27 | End: 2020-12-27

## 2020-12-27 RX ORDER — POLYETHYLENE GLYCOL 3350 17 G/17G
17 POWDER, FOR SOLUTION ORAL 2 TIMES DAILY
Qty: 1530 G | Refills: 1 | Status: SHIPPED | OUTPATIENT
Start: 2020-12-27 | End: 2021-01-26

## 2020-12-27 RX ORDER — DOCUSATE SODIUM 100 MG/1
200 CAPSULE, LIQUID FILLED ORAL ONCE
Status: COMPLETED | OUTPATIENT
Start: 2020-12-27 | End: 2020-12-27

## 2020-12-27 RX ADMIN — DOCUSATE SODIUM 200 MG: 100 CAPSULE, LIQUID FILLED ORAL at 00:49

## 2020-12-27 RX ADMIN — MAGNESIUM CITRATE 296 ML: 1.75 LIQUID ORAL at 00:50

## 2020-12-27 NOTE — ED PROVIDER NOTES
Triage Chief Complaint:   Abdominal Pain and Constipation    [place]:  Today in the ED I had the pleasure of caring for Ardia Bernheim who is a 23 y.o. male that presents today to the emergency department complaining of abdominal pain constipation patient states he has been battling constipation over the last several months ever since he started on Metformin for his diabetes. He states that he has been having constant bouts of diarrhea. States that he had not had a bowel movement in nearly 2 weeks. But had a small bowel movement today with a lot of hard natali he states. Pain is midepigastric in nature. Achy. Nonradiating. No nausea or vomiting no blood per rectum. No rectal trauma. No fever or chills. Has been eating and drinking baseline. ROS:  REVIEW OF SYSTEMS    At least 10 systems reviewed      All other review of systems are negative  See HPI and nursing notes for additional information       Past Medical History:   Diagnosis Date    Asthma      History reviewed. No pertinent surgical history. History reviewed. No pertinent family history.   Social History     Socioeconomic History    Marital status: Single     Spouse name: Not on file    Number of children: Not on file    Years of education: Not on file    Highest education level: Not on file   Occupational History    Not on file   Social Needs    Financial resource strain: Not on file    Food insecurity     Worry: Not on file     Inability: Not on file    Transportation needs     Medical: Not on file     Non-medical: Not on file   Tobacco Use    Smoking status: Current Every Day Smoker     Packs/day: 1.00     Years: 2.00     Pack years: 2.00     Types: Cigarettes    Smokeless tobacco: Never Used   Substance and Sexual Activity    Alcohol use: No    Drug use: No    Sexual activity: Not on file   Lifestyle    Physical activity     Days per week: Not on file     Minutes per session: Not on file    Stress: Not on file   Relationships  Social connections     Talks on phone: Not on file     Gets together: Not on file     Attends Muslim service: Not on file     Active member of club or organization: Not on file     Attends meetings of clubs or organizations: Not on file     Relationship status: Not on file    Intimate partner violence     Fear of current or ex partner: Not on file     Emotionally abused: Not on file     Physically abused: Not on file     Forced sexual activity: Not on file   Other Topics Concern    Not on file   Social History Narrative    Not on file     Current Facility-Administered Medications   Medication Dose Route Frequency Provider Last Rate Last Admin    magnesium citrate solution 296 mL  296 mL Oral Once Sparkle Santiago PA-C        docusate sodium (COLACE) capsule 200 mg  200 mg Oral Daily Sparkle WALE Santiago         Current Outpatient Medications   Medication Sig Dispense Refill    polyethylene glycol (GLYCOLAX) 17 GM/SCOOP powder Take 17 g by mouth 2 times daily 1530 g 1    naproxen (NAPROSYN) 500 MG tablet Take 1 tablet by mouth 2 times daily 60 tablet 0    lidocaine viscous hcl (XYLOCAINE) 2 % SOLN solution Take 5 mLs by mouth as needed for Irritation or Dental Pain 100 mL 0    acetaminophen (APAP EXTRA STRENGTH) 500 MG tablet Take 1 tablet by mouth every 6 hours as needed for Pain 30 tablet 0     No Known Allergies    Nursing Notes Reviewed    Physical Exam:  ED Triage Vitals   Enc Vitals Group      BP 12/26/20 2250 (!) 148/87      Pulse 12/26/20 2250 80      Resp 12/26/20 2250 18      Temp 12/26/20 2250 97.6 °F (36.4 °C)      Temp Source 12/26/20 2250 Oral      SpO2 12/26/20 2250 99 %      Weight 12/26/20 2244 200 lb (90.7 kg)      Height 12/26/20 2244 6' 4\" (1.93 m)      Head Circumference --       Peak Flow --       Pain Score --       Pain Loc --       Pain Edu? --       Excl.  in [address]? --      General :Patient is awake alert oriented person place and time no acute distress nontoxic appearing  HEENT: Pupils are equally round and reactive to light extraocular motors are intact conjunctivae clear sclerae white there is no injection no icterus. Nose without any rhinorrhea or epistaxis. Oral mucosa is moist no exudate buccal mucosa shows no ulcerations. Uvula is midline    Neck: Neck is supple full range of motion trachea midline thyroid nonpalpable  Cardiac: Heart regular rate rhythm no murmurs rubs clicks or gallops  Lungs: Lungs are clear to auscultation there is no wheezing rhonchi or rales. There is no use of accessory muscles no nasal flaring identified. Chest wall: There is no tenderness to palpation over the chest wall or over ribs  Abdomen: Abdomen is soft nontender nondistended. There is no firm or pulsatile masses no rebound rigidity or guarding negative Lowry's negative McBurney, no peritoneal signs  Suprapubic:  there is no tenderness to palpation over the external bladder   Musculoskeletal: 5 out of 5 strength in all 4 extremities full flexion extension abduction and adduction supination pronation of all extremities and all digits. No obvious muscle atrophy is noted. No focal muscle deficits are appreciated  Dermatology: Skin is warm and dry there is no obvious abscesses lacerations or lesions noted  Psych: Mentation is grossly normal cognition is grossly normal. Affect is appropriate  Neuro: Motor intact sensory intact cranial nerves II through XII are intact level of consciousness is normal cerebellar function is normal reflexes are grossly normal. No evidence of incontinence or loss of bowel or bladder no saddle anesthesia noted Lymphatic: There is no submandibular or cervical adenopathy appreciated.         I have reviewed and interpreted all of the currently available lab results from this visit (if applicable):  Results for orders placed or performed during the hospital encounter of 12/26/20   BMP   Result Value Ref Range    Sodium 133 (L) 135 - 145 MMOL/L    Potassium 3.6 3.5 - 5.1 MMOL/L    Chloride 99 99 - 110 mMol/L    CO2 21 21 - 32 MMOL/L    Anion Gap 13 4 - 16    BUN 6 6 - 23 MG/DL    CREATININE 0.6 (L) 0.9 - 1.3 MG/DL    Glucose 163 (H) 70 - 99 MG/DL    Calcium 9.2 8.3 - 10.6 MG/DL    GFR Non-African American >60 >60 mL/min/1.73m2    GFR African American >60 >60 mL/min/1.73m2   CBC auto diff   Result Value Ref Range    WBC 13.5 (H) 4.0 - 10.5 K/CU MM    RBC 4.86 4.6 - 6.2 M/CU MM    Hemoglobin 13.6 13.5 - 18.0 GM/DL    Hematocrit 40.9 (L) 42 - 52 %    MCV 84.2 78 - 100 FL    MCH 28.0 27 - 31 PG    MCHC 33.3 32.0 - 36.0 %    RDW 11.9 11.7 - 14.9 %    Platelets 090 535 - 551 K/CU MM    MPV 10.2 7.5 - 11.1 FL    Differential Type AUTOMATED DIFFERENTIAL     Segs Relative 79.4 (H) 36 - 66 %    Lymphocytes % 13.5 (L) 24 - 44 %    Monocytes % 5.0 (H) 0 - 4 %    Eosinophils % 1.2 0 - 3 %    Basophils % 0.5 0 - 1 %    Segs Absolute 10.7 K/CU MM    Lymphocytes Absolute 1.8 K/CU MM    Monocytes Absolute 0.7 K/CU MM    Eosinophils Absolute 0.2 K/CU MM    Basophils Absolute 0.1 K/CU MM    Nucleated RBC % 0.0 %    Total Nucleated RBC 0.0 K/CU MM    Total Immature Neutrophil 0.06 K/CU MM    Immature Neutrophil % 0.4 0 - 0.43 %      Radiographs (if obtained):  [] The following radiograph was interpreted by myself in the absence of a radiologist:   [] Radiologist's Report Reviewed:  XR ABDOMEN (KUB) (SINGLE AP VIEW)   Final Result   Moderate stool burden in the right side of the colon. Otherwise,   unremarkable appearing abdomen             EKG (if obtained):   Please See Note of attending physician for EKG interpretation. Chart review shows recent radiograph(s):  No results found. MDM:     Belly exam benign, negative labs. CT shows constipation. Laxative and stool cutlture provided.  [facility]  I estimate there is LOW risk for (including but not limited to) ACUTE APPENDICITIS, BOWEL OBSTRUCTION, ACUTE CHOLECYSTITIS, RUPTURED DIVERTICULITIS, INCARCERATED or STRANGULATED HERNIA, HEMMORHAGIC PANCREATITIS, or PERFORATED BOWEL/ULCER, thus I consider the discharge disposition reasonable. Ernestene Fleischer (or their surrogate) and I have discussed the diagnosis and risks, and we agree with discharging home with close follow-up. We also discussed returning to the Emergency Department immediately if new or worsening symptoms occur. We have discussed the symptoms which are most concerning that necessitate immediate return. I independently managed patient today in the ED        BP (!) 148/87   Pulse 80   Temp 97.6 °F (36.4 °C) (Oral)   Resp 18   Ht 6' 4\" (1.93 m)   Wt 200 lb (90.7 kg)   SpO2 99%   BMI 24.34 kg/m²       Clinical Impression:  1. Constipation, unspecified constipation type        Disposition referral (if applicable):  [address] [place] [address] Emergency Department  [address] [place] [address]  [phone]    If symptoms worsen or persist    Disposition medications (if applicable):  New Prescriptions    POLYETHYLENE GLYCOL (GLYCOLAX) 17 GM/SCOOP POWDER    Take 17 g by mouth 2 times daily         Comment: Please note this report has been produced using speech recognition software and may contain errors related to that system including errors in grammar, punctuation, and spelling, as well as words and phrases that may be inappropriate. If there are any questions or concerns please feel free to contact the dictating provider for clarification.       Tapan Shabazz, [address] [place] [address]  12/27/20 0038

## 2021-02-17 ENCOUNTER — APPOINTMENT (OUTPATIENT)
Dept: CT IMAGING | Age: 26
DRG: 691 | End: 2021-02-17
Payer: COMMERCIAL

## 2021-02-17 ENCOUNTER — HOSPITAL ENCOUNTER (INPATIENT)
Age: 26
LOS: 7 days | Discharge: NURSING, REHAB OR LONG-TERM CARE | DRG: 691 | End: 2021-02-25
Attending: EMERGENCY MEDICINE | Admitting: INTERNAL MEDICINE
Payer: COMMERCIAL

## 2021-02-17 DIAGNOSIS — R10.31 RIGHT LOWER QUADRANT ABDOMINAL PAIN: Primary | ICD-10-CM

## 2021-02-17 DIAGNOSIS — R19.00 ABDOMINAL MASS, UNSPECIFIED ABDOMINAL LOCATION: ICD-10-CM

## 2021-02-17 LAB
ALBUMIN SERPL-MCNC: 3.6 GM/DL (ref 3.4–5)
ALP BLD-CCNC: 88 IU/L (ref 40–129)
ALT SERPL-CCNC: 11 U/L (ref 10–40)
ANION GAP SERPL CALCULATED.3IONS-SCNC: 13 MMOL/L (ref 4–16)
AST SERPL-CCNC: 12 IU/L (ref 15–37)
BASOPHILS ABSOLUTE: 0.1 K/CU MM
BASOPHILS RELATIVE PERCENT: 0.2 % (ref 0–1)
BILIRUB SERPL-MCNC: 0.5 MG/DL (ref 0–1)
BUN BLDV-MCNC: 13 MG/DL (ref 6–23)
CALCIUM SERPL-MCNC: 9.3 MG/DL (ref 8.3–10.6)
CHLORIDE BLD-SCNC: 92 MMOL/L (ref 99–110)
CO2: 23 MMOL/L (ref 21–32)
CREAT SERPL-MCNC: 0.9 MG/DL (ref 0.9–1.3)
DIFFERENTIAL TYPE: ABNORMAL
EOSINOPHILS ABSOLUTE: 0.1 K/CU MM
EOSINOPHILS RELATIVE PERCENT: 0.2 % (ref 0–3)
GFR AFRICAN AMERICAN: >60 ML/MIN/1.73M2
GFR NON-AFRICAN AMERICAN: >60 ML/MIN/1.73M2
GLUCOSE BLD-MCNC: 318 MG/DL (ref 70–99)
HCT VFR BLD CALC: 47.7 % (ref 42–52)
HEMOGLOBIN: 15.1 GM/DL (ref 13.5–18)
IMMATURE NEUTROPHIL %: 0.7 % (ref 0–0.43)
LIPASE: 32 IU/L (ref 13–60)
LYMPHOCYTES ABSOLUTE: 1.5 K/CU MM
LYMPHOCYTES RELATIVE PERCENT: 5.2 % (ref 24–44)
MCH RBC QN AUTO: 26.3 PG (ref 27–31)
MCHC RBC AUTO-ENTMCNC: 31.7 % (ref 32–36)
MCV RBC AUTO: 83.1 FL (ref 78–100)
MONOCYTES ABSOLUTE: 0.9 K/CU MM
MONOCYTES RELATIVE PERCENT: 3 % (ref 0–4)
NUCLEATED RBC %: 0 %
PDW BLD-RTO: 11.7 % (ref 11.7–14.9)
PLATELET # BLD: 402 K/CU MM (ref 140–440)
PMV BLD AUTO: 9.7 FL (ref 7.5–11.1)
POTASSIUM SERPL-SCNC: 3.9 MMOL/L (ref 3.5–5.1)
RBC # BLD: 5.74 M/CU MM (ref 4.6–6.2)
SEGMENTED NEUTROPHILS ABSOLUTE COUNT: 25.6 K/CU MM
SEGMENTED NEUTROPHILS RELATIVE PERCENT: 90.7 % (ref 36–66)
SODIUM BLD-SCNC: 128 MMOL/L (ref 135–145)
TOTAL IMMATURE NEUTOROPHIL: 0.2 K/CU MM
TOTAL NUCLEATED RBC: 0 K/CU MM
TOTAL PROTEIN: 8.1 GM/DL (ref 6.4–8.2)
WBC # BLD: 28.3 K/CU MM (ref 4–10.5)

## 2021-02-17 PROCEDURE — 6370000000 HC RX 637 (ALT 250 FOR IP): Performed by: EMERGENCY MEDICINE

## 2021-02-17 PROCEDURE — 80053 COMPREHEN METABOLIC PANEL: CPT

## 2021-02-17 PROCEDURE — 74177 CT ABD & PELVIS W/CONTRAST: CPT

## 2021-02-17 PROCEDURE — 6360000002 HC RX W HCPCS: Performed by: EMERGENCY MEDICINE

## 2021-02-17 PROCEDURE — 99285 EMERGENCY DEPT VISIT HI MDM: CPT

## 2021-02-17 PROCEDURE — 96375 TX/PRO/DX INJ NEW DRUG ADDON: CPT

## 2021-02-17 PROCEDURE — 96365 THER/PROPH/DIAG IV INF INIT: CPT

## 2021-02-17 PROCEDURE — 85025 COMPLETE CBC W/AUTO DIFF WBC: CPT

## 2021-02-17 PROCEDURE — 83690 ASSAY OF LIPASE: CPT

## 2021-02-17 PROCEDURE — 2580000003 HC RX 258: Performed by: EMERGENCY MEDICINE

## 2021-02-17 RX ORDER — 0.9 % SODIUM CHLORIDE 0.9 %
1000 INTRAVENOUS SOLUTION INTRAVENOUS ONCE
Status: COMPLETED | OUTPATIENT
Start: 2021-02-17 | End: 2021-02-18

## 2021-02-17 RX ORDER — DICYCLOMINE HCL 20 MG
20 TABLET ORAL ONCE
Status: COMPLETED | OUTPATIENT
Start: 2021-02-17 | End: 2021-02-17

## 2021-02-17 RX ORDER — SODIUM CHLORIDE 0.9 % (FLUSH) 0.9 %
10 SYRINGE (ML) INJECTION 2 TIMES DAILY
Status: DISCONTINUED | OUTPATIENT
Start: 2021-02-18 | End: 2021-02-18

## 2021-02-17 RX ORDER — ONDANSETRON 2 MG/ML
8 INJECTION INTRAMUSCULAR; INTRAVENOUS EVERY 30 MIN PRN
Status: DISCONTINUED | OUTPATIENT
Start: 2021-02-17 | End: 2021-02-18

## 2021-02-17 RX ADMIN — SODIUM CHLORIDE 1000 ML: 9 INJECTION, SOLUTION INTRAVENOUS at 23:06

## 2021-02-17 RX ADMIN — DICYCLOMINE HYDROCHLORIDE 20 MG: 20 TABLET ORAL at 23:05

## 2021-02-17 RX ADMIN — ONDANSETRON 8 MG: 2 INJECTION INTRAMUSCULAR; INTRAVENOUS at 23:05

## 2021-02-17 ASSESSMENT — PAIN SCALES - GENERAL: PAINLEVEL_OUTOF10: 8

## 2021-02-17 ASSESSMENT — PAIN DESCRIPTION - PAIN TYPE: TYPE: ACUTE PAIN

## 2021-02-18 ENCOUNTER — APPOINTMENT (OUTPATIENT)
Dept: INTERVENTIONAL RADIOLOGY/VASCULAR | Age: 26
DRG: 691 | End: 2021-02-18
Payer: COMMERCIAL

## 2021-02-18 PROBLEM — K63.89 MESENTERIC MASS: Status: ACTIVE | Noted: 2021-02-18

## 2021-02-18 LAB
AMPHETAMINES: NEGATIVE
ANION GAP SERPL CALCULATED.3IONS-SCNC: 8 MMOL/L (ref 4–16)
BACTERIA: NEGATIVE /HPF
BARBITURATE SCREEN URINE: NEGATIVE
BASE EXCESS: 3 (ref 0–3.3)
BASOPHILS ABSOLUTE: 0.1 K/CU MM
BASOPHILS RELATIVE PERCENT: 0.2 % (ref 0–1)
BENZODIAZEPINE SCREEN, URINE: NEGATIVE
BILIRUBIN URINE: NEGATIVE MG/DL
BLOOD, URINE: NEGATIVE
BUN BLDV-MCNC: 9 MG/DL (ref 6–23)
CALCIUM SERPL-MCNC: 8.7 MG/DL (ref 8.3–10.6)
CANNABINOID SCREEN URINE: NEGATIVE
CHLORIDE BLD-SCNC: 97 MMOL/L (ref 99–110)
CLARITY: CLEAR
CO2: 26 MMOL/L (ref 21–32)
COCAINE METABOLITE: NEGATIVE
COLOR: YELLOW
COMMENT: ABNORMAL
CREAT SERPL-MCNC: 0.9 MG/DL (ref 0.9–1.3)
DIFFERENTIAL TYPE: ABNORMAL
EOSINOPHILS ABSOLUTE: 0 K/CU MM
EOSINOPHILS RELATIVE PERCENT: 0 % (ref 0–3)
ESTIMATED AVERAGE GLUCOSE: 163 MG/DL
GFR AFRICAN AMERICAN: >60 ML/MIN/1.73M2
GFR NON-AFRICAN AMERICAN: >60 ML/MIN/1.73M2
GLUCOSE BLD-MCNC: 144 MG/DL (ref 70–99)
GLUCOSE BLD-MCNC: 208 MG/DL (ref 70–99)
GLUCOSE BLD-MCNC: 265 MG/DL (ref 70–99)
GLUCOSE BLD-MCNC: 275 MG/DL (ref 70–99)
GLUCOSE BLD-MCNC: 288 MG/DL (ref 70–99)
GLUCOSE, URINE: >500 MG/DL
HBA1C MFR BLD: 7.3 % (ref 4.2–6.3)
HCO3 VENOUS: 21.9 MMOL/L (ref 19–25)
HCT VFR BLD CALC: 40.6 % (ref 42–52)
HEMOGLOBIN: 13.4 GM/DL (ref 13.5–18)
IMMATURE NEUTROPHIL %: 0.5 % (ref 0–0.43)
INR BLD: 1.22 INDEX
KETONES, URINE: ABNORMAL MG/DL
LEUKOCYTE ESTERASE, URINE: NEGATIVE
LYMPHOCYTES ABSOLUTE: 1.7 K/CU MM
LYMPHOCYTES RELATIVE PERCENT: 7.8 % (ref 24–44)
MCH RBC QN AUTO: 26.7 PG (ref 27–31)
MCHC RBC AUTO-ENTMCNC: 33 % (ref 32–36)
MCV RBC AUTO: 80.9 FL (ref 78–100)
MONOCYTES ABSOLUTE: 0.9 K/CU MM
MONOCYTES RELATIVE PERCENT: 4.2 % (ref 0–4)
MUCUS: ABNORMAL HPF
NITRITE URINE, QUANTITATIVE: NEGATIVE
NUCLEATED RBC %: 0 %
O2 SAT, VEN: 94.3 % (ref 50–70)
OPIATES, URINE: NEGATIVE
OXYCODONE: NEGATIVE
PCO2, VEN: 37 MMHG (ref 38–52)
PDW BLD-RTO: 11.9 % (ref 11.7–14.9)
PH VENOUS: 7.38 (ref 7.32–7.42)
PH, URINE: 5 (ref 5–8)
PHENCYCLIDINE, URINE: NEGATIVE
PLATELET # BLD: 302 K/CU MM (ref 140–440)
PMV BLD AUTO: 10 FL (ref 7.5–11.1)
PO2, VEN: 129 MMHG (ref 28–48)
POTASSIUM SERPL-SCNC: 4.3 MMOL/L (ref 3.5–5.1)
PROTEIN UA: NEGATIVE MG/DL
PROTHROMBIN TIME: 14.8 SECONDS (ref 11.7–14.5)
RBC # BLD: 5.02 M/CU MM (ref 4.6–6.2)
RBC URINE: ABNORMAL /HPF (ref 0–3)
SARS-COV-2, NAAT: NOT DETECTED
SEGMENTED NEUTROPHILS ABSOLUTE COUNT: 18.5 K/CU MM
SEGMENTED NEUTROPHILS RELATIVE PERCENT: 87.3 % (ref 36–66)
SODIUM BLD-SCNC: 131 MMOL/L (ref 135–145)
SOURCE: NORMAL
SPECIFIC GRAVITY UA: 1.03 (ref 1–1.03)
TOTAL IMMATURE NEUTOROPHIL: 0.1 K/CU MM
TOTAL NUCLEATED RBC: 0 K/CU MM
TRICHOMONAS: ABNORMAL /HPF
UROBILINOGEN, URINE: NEGATIVE MG/DL (ref 0.2–1)
WBC # BLD: 21.1 K/CU MM (ref 4–10.5)
WBC UA: 1 /HPF (ref 0–2)

## 2021-02-18 PROCEDURE — 85610 PROTHROMBIN TIME: CPT

## 2021-02-18 PROCEDURE — 6360000002 HC RX W HCPCS: Performed by: INTERNAL MEDICINE

## 2021-02-18 PROCEDURE — 2580000003 HC RX 258: Performed by: EMERGENCY MEDICINE

## 2021-02-18 PROCEDURE — 82962 GLUCOSE BLOOD TEST: CPT

## 2021-02-18 PROCEDURE — 6360000002 HC RX W HCPCS: Performed by: EMERGENCY MEDICINE

## 2021-02-18 PROCEDURE — 6370000000 HC RX 637 (ALT 250 FOR IP): Performed by: SURGERY

## 2021-02-18 PROCEDURE — 80307 DRUG TEST PRSMV CHEM ANLYZR: CPT

## 2021-02-18 PROCEDURE — 36415 COLL VENOUS BLD VENIPUNCTURE: CPT

## 2021-02-18 PROCEDURE — 80048 BASIC METABOLIC PNL TOTAL CA: CPT

## 2021-02-18 PROCEDURE — 99254 IP/OBS CNSLTJ NEW/EST MOD 60: CPT | Performed by: SURGERY

## 2021-02-18 PROCEDURE — 85025 COMPLETE CBC W/AUTO DIFF WBC: CPT

## 2021-02-18 PROCEDURE — 86301 IMMUNOASSAY TUMOR CA 19-9: CPT

## 2021-02-18 PROCEDURE — 83036 HEMOGLOBIN GLYCOSYLATED A1C: CPT

## 2021-02-18 PROCEDURE — 6360000004 HC RX CONTRAST MEDICATION: Performed by: EMERGENCY MEDICINE

## 2021-02-18 PROCEDURE — 82105 ALPHA-FETOPROTEIN SERUM: CPT

## 2021-02-18 PROCEDURE — 6370000000 HC RX 637 (ALT 250 FOR IP): Performed by: INTERNAL MEDICINE

## 2021-02-18 PROCEDURE — 87635 SARS-COV-2 COVID-19 AMP PRB: CPT

## 2021-02-18 PROCEDURE — 6370000000 HC RX 637 (ALT 250 FOR IP): Performed by: EMERGENCY MEDICINE

## 2021-02-18 PROCEDURE — 82378 CARCINOEMBRYONIC ANTIGEN: CPT

## 2021-02-18 PROCEDURE — 94761 N-INVAS EAR/PLS OXIMETRY MLT: CPT

## 2021-02-18 PROCEDURE — 2580000003 HC RX 258: Performed by: INTERNAL MEDICINE

## 2021-02-18 PROCEDURE — 81001 URINALYSIS AUTO W/SCOPE: CPT

## 2021-02-18 PROCEDURE — 87086 URINE CULTURE/COLONY COUNT: CPT

## 2021-02-18 PROCEDURE — 1200000000 HC SEMI PRIVATE

## 2021-02-18 PROCEDURE — 82805 BLOOD GASES W/O2 SATURATION: CPT

## 2021-02-18 RX ORDER — DEXTROSE MONOHYDRATE 25 G/50ML
12.5 INJECTION, SOLUTION INTRAVENOUS PRN
Status: DISCONTINUED | OUTPATIENT
Start: 2021-02-18 | End: 2021-02-25 | Stop reason: HOSPADM

## 2021-02-18 RX ORDER — PROMETHAZINE HYDROCHLORIDE 12.5 MG/1
12.5 TABLET ORAL EVERY 6 HOURS PRN
Status: DISCONTINUED | OUTPATIENT
Start: 2021-02-18 | End: 2021-02-25 | Stop reason: HOSPADM

## 2021-02-18 RX ORDER — ONDANSETRON 2 MG/ML
4 INJECTION INTRAMUSCULAR; INTRAVENOUS EVERY 6 HOURS PRN
Status: DISCONTINUED | OUTPATIENT
Start: 2021-02-18 | End: 2021-02-25 | Stop reason: HOSPADM

## 2021-02-18 RX ORDER — SODIUM CHLORIDE 0.9 % (FLUSH) 0.9 %
10 SYRINGE (ML) INJECTION EVERY 12 HOURS SCHEDULED
Status: DISCONTINUED | OUTPATIENT
Start: 2021-02-18 | End: 2021-02-25 | Stop reason: HOSPADM

## 2021-02-18 RX ORDER — SODIUM CHLORIDE 9 MG/ML
INJECTION, SOLUTION INTRAVENOUS CONTINUOUS
Status: DISCONTINUED | OUTPATIENT
Start: 2021-02-18 | End: 2021-02-21

## 2021-02-18 RX ORDER — ACETAMINOPHEN 325 MG/1
650 TABLET ORAL EVERY 6 HOURS PRN
Status: DISCONTINUED | OUTPATIENT
Start: 2021-02-18 | End: 2021-02-25 | Stop reason: HOSPADM

## 2021-02-18 RX ORDER — POLYETHYLENE GLYCOL 3350 17 G/17G
17 POWDER, FOR SOLUTION ORAL DAILY PRN
Status: DISCONTINUED | OUTPATIENT
Start: 2021-02-18 | End: 2021-02-19 | Stop reason: SDUPTHER

## 2021-02-18 RX ORDER — ACETAMINOPHEN 650 MG/1
650 SUPPOSITORY RECTAL EVERY 6 HOURS PRN
Status: DISCONTINUED | OUTPATIENT
Start: 2021-02-18 | End: 2021-02-25 | Stop reason: HOSPADM

## 2021-02-18 RX ORDER — POLYETHYLENE GLYCOL 3350 17 G/17G
17 POWDER, FOR SOLUTION ORAL DAILY PRN
Status: DISCONTINUED | OUTPATIENT
Start: 2021-02-18 | End: 2021-02-21

## 2021-02-18 RX ORDER — SODIUM CHLORIDE, SODIUM LACTATE, POTASSIUM CHLORIDE, CALCIUM CHLORIDE 600; 310; 30; 20 MG/100ML; MG/100ML; MG/100ML; MG/100ML
1000 INJECTION, SOLUTION INTRAVENOUS ONCE
Status: COMPLETED | OUTPATIENT
Start: 2021-02-18 | End: 2021-02-18

## 2021-02-18 RX ORDER — DEXTROSE MONOHYDRATE 50 MG/ML
100 INJECTION, SOLUTION INTRAVENOUS PRN
Status: DISCONTINUED | OUTPATIENT
Start: 2021-02-18 | End: 2021-02-25 | Stop reason: HOSPADM

## 2021-02-18 RX ORDER — MORPHINE SULFATE 4 MG/ML
4 INJECTION, SOLUTION INTRAMUSCULAR; INTRAVENOUS
Status: DISCONTINUED | OUTPATIENT
Start: 2021-02-18 | End: 2021-02-18

## 2021-02-18 RX ORDER — SODIUM CHLORIDE 0.9 % (FLUSH) 0.9 %
10 SYRINGE (ML) INJECTION PRN
Status: DISCONTINUED | OUTPATIENT
Start: 2021-02-18 | End: 2021-02-25 | Stop reason: HOSPADM

## 2021-02-18 RX ORDER — MORPHINE SULFATE 2 MG/ML
2 INJECTION, SOLUTION INTRAMUSCULAR; INTRAVENOUS EVERY 4 HOURS PRN
Status: DISCONTINUED | OUTPATIENT
Start: 2021-02-18 | End: 2021-02-21

## 2021-02-18 RX ORDER — NICOTINE POLACRILEX 4 MG
15 LOZENGE BUCCAL PRN
Status: DISCONTINUED | OUTPATIENT
Start: 2021-02-18 | End: 2021-02-25 | Stop reason: HOSPADM

## 2021-02-18 RX ORDER — POTASSIUM CHLORIDE 7.45 MG/ML
10 INJECTION INTRAVENOUS ONCE
Status: COMPLETED | OUTPATIENT
Start: 2021-02-18 | End: 2021-02-18

## 2021-02-18 RX ADMIN — SODIUM CHLORIDE, PRESERVATIVE FREE 10 ML: 5 INJECTION INTRAVENOUS at 00:16

## 2021-02-18 RX ADMIN — SODIUM CHLORIDE, POTASSIUM CHLORIDE, SODIUM LACTATE AND CALCIUM CHLORIDE 1000 ML: 600; 310; 30; 20 INJECTION, SOLUTION INTRAVENOUS at 01:38

## 2021-02-18 RX ADMIN — MAGNESIUM CITRATE 296 ML: 1.75 LIQUID ORAL at 12:39

## 2021-02-18 RX ADMIN — INSULIN LISPRO 2 UNITS: 100 INJECTION, SOLUTION INTRAVENOUS; SUBCUTANEOUS at 20:33

## 2021-02-18 RX ADMIN — SODIUM CHLORIDE: 9 INJECTION, SOLUTION INTRAVENOUS at 03:20

## 2021-02-18 RX ADMIN — MORPHINE SULFATE 4 MG: 4 INJECTION, SOLUTION INTRAMUSCULAR; INTRAVENOUS at 01:39

## 2021-02-18 RX ADMIN — PIPERACILLIN AND TAZOBACTAM 3375 MG: 3; .375 INJECTION, POWDER, FOR SOLUTION INTRAVENOUS at 01:02

## 2021-02-18 RX ADMIN — MORPHINE SULFATE 2 MG: 2 INJECTION, SOLUTION INTRAMUSCULAR; INTRAVENOUS at 16:50

## 2021-02-18 RX ADMIN — IOPAMIDOL 80 ML: 755 INJECTION, SOLUTION INTRAVENOUS at 00:15

## 2021-02-18 RX ADMIN — ACETAMINOPHEN 650 MG: 325 TABLET ORAL at 12:39

## 2021-02-18 RX ADMIN — POTASSIUM CHLORIDE 10 MEQ: 7.46 INJECTION, SOLUTION INTRAVENOUS at 04:37

## 2021-02-18 RX ADMIN — VANCOMYCIN HYDROCHLORIDE 1750 MG: 5 INJECTION, POWDER, LYOPHILIZED, FOR SOLUTION INTRAVENOUS at 01:40

## 2021-02-18 RX ADMIN — MORPHINE SULFATE 2 MG: 2 INJECTION, SOLUTION INTRAMUSCULAR; INTRAVENOUS at 21:19

## 2021-02-18 RX ADMIN — INSULIN HUMAN 10 UNITS: 100 INJECTION, SOLUTION PARENTERAL at 04:40

## 2021-02-18 RX ADMIN — SODIUM CHLORIDE, PRESERVATIVE FREE 10 ML: 5 INJECTION INTRAVENOUS at 20:35

## 2021-02-18 ASSESSMENT — PAIN DESCRIPTION - PAIN TYPE: TYPE: ACUTE PAIN

## 2021-02-18 ASSESSMENT — ENCOUNTER SYMPTOMS
COUGH: 0
BACK PAIN: 0
VOMITING: 1
SINUS PRESSURE: 0
SINUS PAIN: 0
WHEEZING: 0
CHEST TIGHTNESS: 0
CONSTIPATION: 1
DIARRHEA: 0
ABDOMINAL DISTENTION: 1
RESPIRATORY NEGATIVE: 1
SHORTNESS OF BREATH: 0
NAUSEA: 1
SORE THROAT: 0
ABDOMINAL PAIN: 1

## 2021-02-18 ASSESSMENT — PAIN DESCRIPTION - ORIENTATION: ORIENTATION: RIGHT

## 2021-02-18 ASSESSMENT — PAIN DESCRIPTION - LOCATION
LOCATION: ABDOMEN
LOCATION: ABDOMEN

## 2021-02-18 ASSESSMENT — PAIN SCALES - GENERAL
PAINLEVEL_OUTOF10: 7
PAINLEVEL_OUTOF10: 8

## 2021-02-18 NOTE — CARE COORDINATION
Reviewed chart and spoke with pt about discharge, needs/plans. Pt lives with his parents , he is independent with ADL's and transportation. His parents are able to assist him as needed. He has a PCP appointment for 2/22 and has insurance that will cover medications. No needs identified at this time. CM will continue to follow for needs.

## 2021-02-18 NOTE — H&P
HISTORY AND PHYSICAL  (Hospitalist, Internal Medicine)  IDENTIFYING INFORMATION   PATIENT:  Roberto Vidal  MRN:  [id number]  ADMIT DATE: 2/17/2021      CHIEF COMPLAINT   Abdominal pain    HISTORY OF PRESENT ILLNESS   Roberto Vidal is a 23 y.o. male with diabetes mellitus type 2, currently not on any medications, presented to ED with abdominal pain. Patient reported agonizing, epigastric pain, sudden onset today, denied any nausea, vomiting, fever, chills. Patient reported he has been having abdominal pain for the last 3 to 4 months. Dull aching, 2-3/10 in intensity, has very poor appetite, constipation. Patient reports that he usually has a bowel movement every 2 to 3 days, longest being 3 weeks, and also when he has a bowel movement it is small and hard pebble-like. Patient gives a history of weight loss of 100-140lbs since 2016. Does not have a primary care physician. Patient was prescribed Metformin for his diabetes, but did not tolerate it due to diarrhea and self discontinued it. Patient reports that he has been watching his diet, checks his blood sugar. Because of ongoing abdominal pain and constipation patient made an appointment with his PCP next week. For the pain was severe today which prompted him to come to the ER. Patient denied any urinary symptoms, last BM-1 week ago. Vitals at presentation-/73, , RR 17, temperature 98.2, saturating 98% on room air. Lab work significant for sodium 128, CO2 23, random glucose 318, LFTs within normal limit, WBC 28.3, platelets 725, UA not suggestive of infection. VBG-pH 7.38, PCO2 37, PO2 129, HCO3 21.9. CT abdomen/pelvis-was abnormal.  General surgeon Dr. Miroslava Vincent was consulted. Patient received IV fluids, morphine, IV insulin 10 units, vancomycin, Zosyn. PAST MEDICAL HISTORY PAST SURGICAL HISTORY   Diabetes mellitus type 2  none significant   FAMILY HISTORY SOCIAL HISTORY    Father has diabetes, Hodgkin's lymphoma in aunt-mother sister. Admits to smoking 1 pack/day, denies any alcohol or illicit abuse   MEDICATIONS ALLERGIES    Tylenol as needed   no known drug allergies       PAST MEDICAL, SURGICAL, FAMILY, and SOCIAL HISTORY         Past Medical History:   Diagnosis Date    Asthma     Diabetes mellitus ([facility] [address]      History reviewed. No pertinent surgical history. History reviewed. No pertinent family history. Family Hx of HTN  Family Hx as reviewed above, otherwise non-contributory  Social History     Socioeconomic History    Marital status: Single     Spouse name: None    Number of children: None    Years of education: None    Highest education level: None   Occupational History    None   Social Needs    Financial resource strain: None    Food insecurity     Worry: None     Inability: None    Transportation needs     Medical: None     Non-medical: None   Tobacco Use    Smoking status: Current Every Day Smoker     Packs/day: 1.00     Years: 2.00     Pack years: 2.00     Types: Cigarettes    Smokeless tobacco: Never Used   Substance and Sexual Activity    Alcohol use: No    Drug use: No    Sexual activity: None   Lifestyle    Physical activity     Days per week: None     Minutes per session: None    Stress: None   Relationships    Social connections     Talks on phone: None     Gets together: None     Attends [employer] service: None     Active member of club or organization: None     Attends meetings of clubs or organizations: None     Relationship status: None    Intimate partner violence     Fear of current or ex partner: None     Emotionally abused: None     Physically abused: None     Forced sexual activity: None   Other Topics Concern    None   Social History Narrative    None       MEDICATIONS   Medications Prior to Admission  Not in a hospital admission.     Current Medications  Current Facility-Administered Medications   Medication Dose Route Frequency Provider Last Rate Last Admin    vancomycin (VANCOCIN) 1,750 mg in dextrose 5 % 500 mL IVPB  1,750 mg Intravenous Once Girtha Addie,  mL/hr at 02/18/21 0140 1,750 mg at 02/18/21 0140    morphine sulfate (PF) injection 4 mg  4 mg Intravenous Q2H PRN Girtha Addie, DO   4 mg at 02/18/21 0139    lactated ringers infusion 1,000 mL  1,000 mL Intravenous Once Girtha Addie, DO 1,000 mL/hr at 02/18/21 0138 1,000 mL at 02/18/21 0138    potassium chloride 10 mEq/100 mL IVPB (Peripheral Line)  10 mEq Intravenous Once Girtha Addie, DO        insulin regular (HUMULIN R;NOVOLIN R) injection 10 Units  10 Units Intravenous Once Girtha Addie, DO        ondansetron [facility]) injection 8 mg  8 mg Intravenous Q30 Min PRN Girtha Addie, DO   8 mg at 02/17/21 2305    sodium chloride flush 0.9 % injection 10 mL  10 mL Intravenous BID Girtha Addie, DO   10 mL at 02/18/21 0016     Current Outpatient Medications   Medication Sig Dispense Refill    metFORMIN (GLUCOPHAGE-XR) 500 MG extended release tablet       naproxen (NAPROSYN) 500 MG tablet Take 1 tablet by mouth 2 times daily 60 tablet 0    acetaminophen (APAP EXTRA STRENGTH) 500 MG tablet Take 1 tablet by mouth every 6 hours as needed for Pain 30 tablet 0         Allergies  No Known Allergies    REVIEW OF SYSTEMS   Within above limitations. 14 point review of systems reviewed. Pertinent positive or negative as per HPI or otherwise negative per 14 point systems review. PHYSICAL EXAM     Wt Readings from Last 3 Encounters:   02/17/21 200 lb (90.7 kg)   12/26/20 200 lb (90.7 kg)   11/05/20 235 lb (106.6 kg)       Blood pressure 120/73, pulse 101, temperature 98.2 °F (36.8 °C), temperature source Oral, resp. rate 17, height 6' 4\" (1.93 m), weight 200 lb (90.7 kg), SpO2 98 %. GEN  -Awake, alert, NAD.   EYES   -PERRL. HENT  -MM are moist.   RESP  -LS CTA equal bilat, no wheezes, rales or rhonchi. Symmetric chest movement. No respiratory distress noted.   C/V  -S1/S2 auscultated, tachycardia without appreciable Latest Ref Range: 11.7 - 14.9 % 11.7   Platelet Count Latest Ref Range: 140 - 440 K/CU    Lymphocyte % Latest Ref Range: 24 - 44 % 5.2 (L)   Monocytes % Latest Ref Range: 0 - 4 % 3.0   Eosinophils % Latest Ref Range: 0 - 3 % 0.2   Basophils % Latest Ref Range: 0 - 1 % 0.2   Lymphocytes Absolute Latest Units: K/CU MM 1.5   Monocytes Absolute Latest Units: K/CU MM 0.9   Eosinophils Absolute Latest Units: K/CU MM 0.1   Basophils Absolute Latest Units: K/CU MM 0.1   Differential Type Unknown AUTOMATED DIFFERENTIAL   Segs Relative Latest Ref Range: 36 - 66 % 90.7 (H)   Segs Absolute Latest Units: K/CU MM 25.6   Nucleated RBC % Latest Units: % 0.0   Immature Neutrophil % Latest Ref Range: 0 - 0.43 % 0.7 (H)   Total Immature Neutrophil Latest Units: K/CU MM 0.20   Total Nucleated RBC Latest Units: K/CU MM 0.0     Results for Julieth Clark (MRN [id number]) as of 2/18/2021 02:11   Ref. Range 2/18/2021 01:30   pH, Eduard Latest Ref Range: 7.32 - 7.42  7.38   pCO2, Eduard Latest Ref Range: 38 - 52 mmHG 37 (L)   pO2, Eduard Latest Ref Range: 28 - 48 mmHG 129 (H)   HCO3, Venous Latest Ref Range: 19 - 25 MMOL/L 21.9   O2 Sat, Eduard Latest Ref Range: 50 - 70 % 94.3 (H)     Results for Julieth Clark (MRN [id number]) as of 2/18/2021 02:11   Ref.  Range 2/18/2021 00:43   Amphetamines Latest Ref Range: NEGATIVE  NEGATIVE   Cocaine Metabolite Latest Ref Range: NEGATIVE  NEGATIVE   Barbiturate Screen, Ur Latest Ref Range: NEGATIVE  NEGATIVE   Benzodiazepine Screen, Urine Latest Ref Range: NEGATIVE  NEGATIVE   Cannabinoid Scrn, Ur Latest Ref Range: NEGATIVE  NEGATIVE   Opiates, Urine Latest Ref Range: NEGATIVE  NEGATIVE   Oxycodone Latest Ref Range: NEGATIVE  NEGATIVE   CULTURE, URINE Unknown Rpt   Color, UA Latest Ref Range: YELLOW  YELLOW   Clarity, UA Latest Ref Range: CLEAR  CLEAR   Bilirubin, Urine Latest Ref Range: NEGATIVE MG/DL NEGATIVE   Ketones, Urine Latest Ref Range: NEGATIVE MG/DL LARGE (A)   Specific [place], UA Latest Ref Range: 1.001 - 1.035  1.035   Blood, Urine Latest Ref Range: NEGATIVE  NEGATIVE   Protein, UA Latest Ref Range: NEGATIVE MG/DL NEGATIVE   Urobilinogen, Urine Latest Ref Range: 0.2 - 1.0 MG/DL NEGATIVE   Leukocyte Esterase, Urine Latest Ref Range: NEGATIVE  NEGATIVE   Glucose, Urine Latest Ref Range: NEGATIVE MG/DL >500 (A)   Nitrite Urine, Quantitative Latest Ref Range: NEGATIVE  NEGATIVE   pH, Urine Latest Ref Range: 5.0 - 8.0  5.0   Mucus, UA Latest Ref Range: NEGATIVE HPF RARE (A)   WBC, UA Latest Ref Range: 0 - 2 /HPF 1   RBC, UA Latest Ref Range: 0 - 3 /HPF NONE SEEN   Bacteria, UA Latest Ref Range: NEGATIVE /HPF NEGATIVE   Trichomonas, UA Latest Ref Range: NONE SEEN /HPF NONE SEEN   Phencyclidine, Urine Latest Ref Range: NEGATIVE  NEGATIVE     Recent Imaging    CT ABDOMEN PELVIS W IV CONTRAST Additional Contrast? None [[id number]] Collected: 02/18/21 0018      Order Status: Completed Updated: 02/18/21 0036     Narrative:       EXAMINATION:   CT OF THE ABDOMEN AND PELVIS WITH CONTRAST 2/17/2021 7:10 pm     TECHNIQUE:   CT of the abdomen and pelvis was performed with the administration of   intravenous contrast. Multiplanar reformatted images are provided for review. Dose modulation, iterative reconstruction, and/or weight based adjustment of   the mA/kV was utilized to reduce the radiation dose to as low as reasonably   achievable. COMPARISON:   None. HISTORY:   ORDERING SYSTEM PROVIDED HISTORY: Pain   TECHNOLOGIST PROVIDED HISTORY:   Reason for exam:->Pain   Additional Contrast?->None   Decision Support Exception->Emergency Medical Condition (MA)   Reason for Exam: Emesis; Abdominal Pain   Acuity: Acute   Type of Exam: Initial   Additional signs and symptoms: Emesis; Abdominal Pain   Relevant Medical/Surgical History: isovue 370 80 ml     FINDINGS:   Lower Chest: The lung bases are clear.      Organs: Mild degree of hepatosplenomegaly is present.  The gallbladder,   adrenal glands, and kidneys appear normal.  A 1.8 cm cystic lesion is present   within the tail of the pancreas and may represent sequela of prior   pancreatitis and pseudocyst formation.  No peripancreatic inflammation is   present. GI/Bowel: Stomach is mildly distended.  Abnormal wall thickening and   surrounding inflammation is present involving the gastric antrum, duodenal   C-loop, and proximal jejunum.  Distal small bowel loops appear normal.  The   appendix appears normal.  Scattered colonic diverticula are noted, without   evidence of diverticulitis. Pelvis: Urinary bladder and prostate gland appear normal.     Peritoneum/Retroperitoneum: No aneurysm formation is present.  Within the   mesentery, an approximate 3.7 x 4.3 cm soft tissue mass is present, with   central low attenuation.  Adjacent to this lesion are multiple mesenteric   lymph nodes, largest measuring 1.2 cm.  Within the lower abdominal mesentery,   and additional 2.2 x 2.1 cm soft tissue mass with central low attenuation is   present.  Diffuse inflammation of the mesentery is present within the upper   abdomen.  Small to moderate amount of free fluid is present within the   pelvis.  Small amount of free fluid is present within the right paracolic   gutter. Bones/Soft Tissues: No acute osseous abnormality is present.      Impression:       1. Abnormal wall thickening involving the gastric antrum, duodenal C-loop,   and proximal jejunum, which may be inflammatory or neoplastic in etiology. 2. Approximate 4.3 x 3.7 cm heterogeneous \"mass\", within the mesentery, with   surrounding inflammation.  Differential considerations would include a   necrotic neoplasm, necrotic adenopathy related to lymphoma or metastatic   disease, or an infectious process.  An additional 2.2 x 2.1 cm mass with   central low attenuation is present more caudally within the mesentery.  No   air bubbles are present within these collections to suggest abscesses.    Multiple additional mesenteric lymph nodes are present. 3. 1.8 cm cystic lesion within the tail the pancreas, and may represent   sequela of prior pancreatitis. 4. Moderate amount of free fluid present within the pelvis   5. Surgical consultation recommended          Relevant labs and imaging reviewed    ASSESSMENT AND PLAN     #. Mesenteric mass:  -Patient presented with abdominal pain. -CT abdomen/pelvis-Mild hepatosplenomegaly, 1.8 cm cystic lesion in the tail of the pancreas, stomach is mildly distended, abnormal wall thickening and surrounding inflammation involving the gastric antrum, duodenal C-loop and proximal jejunum, 3.7x 4.3 cm soft tissue mass within the mesentery, adjacent to this lesion or multiple mesenteric lymph nodes, another mass measuring 2.2 x 2.1 cm within the lower abdominal mesentery. Diffuse inflammation of the mesentery is present within the upper abdomen.  -Dr. Julieta Atkins consulted from ED-recommended IR consultation for biopsy. -IR consult ordered.  -N.p.o.  -Continue IV fluids, analgesics, antiemetics. #.  Leukocytosis:  -Patient was tachycardic at presentation, afebrile  -UA not suggestive of infection.  -Patient received vancomycin, Zosyn in ER  -Hold off antibiotics and monitor. #.  Hyponatremia:  -Could be secondary to poor oral intake, could be pseudohyponatremia secondary to hyperglycemia.  -Continue IV fluids and repeat BMP. #.  Uncontrolled diabetes mellitus type 2  -Patient reported that he is not taking any medications currently. -A1c was 10.8-11/7/2020. Repeat A1c ordered.  -Insulin sliding scale every 6 hours until able to take p.o. DVT Prophylaxis: Hold chemical prophylaxis for anticipated biopsy  GI Prophylaxis: Not indicated  Code Status:FULL.       Case d/w ED physician    Fina Smith MD  Hospitalist, Internal Medicine  2/18/2021 at 1:47 AM

## 2021-02-18 NOTE — PROGRESS NOTES
89   Resp: 14   Temp: 98.1 °F (36.7 °C)   SpO2: 96%     Physical Exam:    GEN Awake male, sitting upright in bed in no apparent distress. Appears given age. EYES Pupils are equally round. No scleral erythema, discharge, or conjunctivitis. HENT Mucous membranes are moist.   NECK No apparent thyromegaly or masses. RESP Clear to auscultation, no wheezes, rales or rhonchi. Symmetric chest movement while on room air. CARDIO/VASC S1/S2 auscultated. Regular rate without appreciable murmurs, rubs, or gallops. Peripheral pulses equal bilaterally and palpable. No peripheral edema. GI right lower quadrant pain, no masses palpable or guarding. Bowel sounds are normoactive. Rectal exam deferred.  Su catheter is not present. HEME/LYMPH No petechiae or ecchymoses. MSK No gross joint deformities. Spontaneous movement of all extremities  SKIN Normal coloration, warm, dry. NEURO Cranial nerves appear grossly intact, normal speech, no lateralizing weakness. PSYCH Awake, alert, oriented x 4. Affect appropriate.     Medications:   Medications:    sodium chloride flush  10 mL Intravenous 2 times per day    insulin lispro  0-12 Units Subcutaneous 4x Daily WC      Infusions:    sodium chloride 125 mL/hr at 02/18/21 0320    dextrose       PRN Meds:     sodium chloride flush, 10 mL, PRN      promethazine, 12.5 mg, Q6H PRN    Or      ondansetron, 4 mg, Q6H PRN      polyethylene glycol, 17 g, Daily PRN      acetaminophen, 650 mg, Q6H PRN    Or      acetaminophen, 650 mg, Q6H PRN      glucose, 15 g, PRN      dextrose, 12.5 g, PRN      glucagon (rDNA), 1 mg, PRN      dextrose, 100 mL/hr, PRN      polyethylene glycol, 17 g, Daily PRN        Data    Recent Labs     02/17/21 2255 02/18/21  0632   WBC 28.3* 21.1*   HGB 15.1 13.4*   HCT 47.7 40.6*    302      Recent Labs     02/17/21 2255 02/18/21  0632   * 131*   K 3.9 4.3   CL 92* 97*   CO2 23 26   BUN 13 9   CREATININE 0.9 0.9     Recent Labs 02/17/21  2255   AST 12*   ALT 11   BILITOT 0.5   ALKPHOS 88     Recent Labs     02/18/21  0632   INR 1.22     No results for input(s): CKTOTAL, CKMB, CKMBINDEX, TROPONINI in the last 72 hours.         Electronically signed by Juani Paz MD on 2/18/2021 at 1:53 PM

## 2021-02-18 NOTE — PROGRESS NOTES
Pt has not been NPO today. IR will make pt NPO tonight at midnight for tomorrows biopsy. Pt needs to be NPO at midnight tonight and IR will try to get the patient by [facility] for his mesenteric biopsy. Pt is aware. Nurse is aware. Pt states he would rather have the biopsy tomorrow than today. Pt states, \" I am taking medicine to help me poop because I have not had a regular poop since November, can we just do it tomorrow? \" Dr. Pierre Robert is aware and will complete the biopsy tomorrow 2/19/2021

## 2021-02-18 NOTE — ED PROVIDER NOTES
Psychiatric/Behavioral: Negative. Negative for agitation and confusion. The patient is not nervous/anxious. All other systems reviewed and are negative. Except as noted above the remainder of the review of systems was reviewed and negative. PAST MEDICAL HISTORY     Past Medical History:   Diagnosis Date    Asthma        Prior to Admission medications    Medication Sig Start Date End Date Taking? Authorizing Provider   metFORMIN (GLUCOPHAGE-XR) 500 MG extended release tablet  11/13/20   Historical Provider, MD   naproxen (NAPROSYN) 500 MG tablet Take 1 tablet by mouth 2 times daily 9/9/19   Osorio Larios PA-C   acetaminophen (APAP EXTRA STRENGTH) 500 MG tablet Take 1 tablet by mouth every 6 hours as needed for Pain 8/11/18   ESTER Browne        There is no problem list on file for this patient. SURGICAL HISTORY     History reviewed. No pertinent surgical history. CURRENT MEDICATIONS       Previous Medications    ACETAMINOPHEN (APAP EXTRA STRENGTH) 500 MG TABLET    Take 1 tablet by mouth every 6 hours as needed for Pain    METFORMIN (GLUCOPHAGE-XR) 500 MG EXTENDED RELEASE TABLET        NAPROXEN (NAPROSYN) 500 MG TABLET    Take 1 tablet by mouth 2 times daily       ALLERGIES     Patient has no known allergies. FAMILY HISTORY     History reviewed. No pertinent family history.        SOCIAL HISTORY       Social History     Socioeconomic History    Marital status: Single     Spouse name: None    Number of children: None    Years of education: None    Highest education level: None   Occupational History    None   Social Needs    Financial resource strain: None    Food insecurity     Worry: None     Inability: None    Transportation needs     Medical: None     Non-medical: None   Tobacco Use    Smoking status: Current Every Day Smoker     Packs/day: 1.00     Years: 2.00     Pack years: 2.00     Types: Cigarettes    Smokeless tobacco: Never Used   Substance and Sexual Activity    Alcohol use: No    Drug use: No    Sexual activity: None   Lifestyle    Physical activity     Days per week: None     Minutes per session: None    Stress: None   Relationships    Social connections     Talks on phone: None     Gets together: None     Attends Latter-day service: None     Active member of club or organization: None     Attends meetings of clubs or organizations: None     Relationship status: None    Intimate partner violence     Fear of current or ex partner: None     Emotionally abused: None     Physically abused: None     Forced sexual activity: None   Other Topics Concern    None   Social History Narrative    None       SCREENINGS               PHYSICAL EXAM    (up to 7 for level 4, 8 or more for level 5)     ED Triage Vitals   BP Temp Temp Source Pulse Resp SpO2 Height Weight   02/17/21 2209 02/17/21 2209 02/17/21 2209 02/17/21 2209 02/17/21 2209 02/17/21 2209 02/17/21 2258 02/17/21 2258   120/73 98.2 °F (36.8 °C) Oral 101 17 98 % 6' 4\" (1.93 m) 200 lb (90.7 kg)       Physical Exam  Vitals signs and nursing note reviewed. Constitutional:       General: He is not in acute distress. Appearance: He is well-developed. He is not diaphoretic. HENT:      Head: Normocephalic and atraumatic. Nose: Nose normal.      Mouth/Throat:      Pharynx: No oropharyngeal exudate. Eyes:      General:         Right eye: No discharge. Left eye: No discharge. Conjunctiva/sclera: Conjunctivae normal.      Pupils: Pupils are equal, round, and reactive to light. Neck:      Musculoskeletal: Normal range of motion. Vascular: No JVD. Trachea: No tracheal deviation. Cardiovascular:      Rate and Rhythm: Normal rate and regular rhythm. Heart sounds: Normal heart sounds. No murmur. No friction rub. No gallop. Pulmonary:      Effort: Pulmonary effort is normal. No respiratory distress. Breath sounds: Normal breath sounds. No stridor. No wheezing or rales. Chest:      Chest wall: No tenderness. Abdominal:      General: Bowel sounds are normal. There is no distension. Palpations: Abdomen is soft. There is no mass. Tenderness: There is abdominal tenderness in the right upper quadrant, right lower quadrant and periumbilical area. There is guarding and rebound. Hernia: No hernia is present. Musculoskeletal: Normal range of motion. General: No tenderness or deformity. Lymphadenopathy:      Cervical: No cervical adenopathy. Skin:     General: Skin is warm and dry. Capillary Refill: Capillary refill takes less than 2 seconds. Coloration: Skin is not pale. Findings: No erythema or rash. Neurological:      Mental Status: He is alert and oriented to person, place, and time. Cranial Nerves: No cranial nerve deficit. Motor: No abnormal muscle tone. Coordination: Coordination normal.   Psychiatric:         Behavior: Behavior normal.         Thought Content:  Thought content normal.         Judgment: Judgment normal.         DIAGNOSTIC RESULTS     Labs Reviewed   CBC WITH AUTO DIFFERENTIAL - Abnormal; Notable for the following components:       Result Value    WBC 28.3 (*)     MCH 26.3 (*)     MCHC 31.7 (*)     Segs Relative 90.7 (*)     Lymphocytes % 5.2 (*)     Immature Neutrophil % 0.7 (*)     All other components within normal limits   COMPREHENSIVE METABOLIC PANEL W/ REFLEX TO MG FOR LOW K - Abnormal; Notable for the following components:    Sodium 128 (*)     Chloride 92 (*)     Glucose 318 (*)     AST 12 (*)     All other components within normal limits   CULTURE, URINE   LIPASE   URINE DRUG SCREEN   URINALYSIS          EKG: All EKG's are interpreted by the Emergency Department Physician who either signs or Co-signs this chart in the absence of a cardiologist.       EKG Interpretation    Interpreted by emergency department physician    Beth Baron     RADIOLOGY:     Non-plain film images such as CT, Ultrasound and MRI are read by the radiologist. Plain radiographic images are visualized and preliminarily interpreted by the emergency physician. Interpretation per the Radiologist below, if available at the time of this note:    CT ABDOMEN PELVIS W IV CONTRAST Additional Contrast? None   Final Result   1. Abnormal wall thickening involving the gastric antrum, duodenal C-loop,   and proximal jejunum, which may be inflammatory or neoplastic in etiology. 2. Approximate 4.3 x 3.7 cm heterogeneous \"mass\", within the mesentery, with   surrounding inflammation. Differential considerations would include a   necrotic neoplasm, necrotic adenopathy related to lymphoma or metastatic   disease, or an infectious process. An additional 2.2 x 2.1 cm mass with   central low attenuation is present more caudally within the mesentery. No   air bubbles are present within these collections to suggest abscesses. Multiple additional mesenteric lymph nodes are present. 3. 1.8 cm cystic lesion within the tail the pancreas, and may represent   sequela of prior pancreatitis. 4. Moderate amount of free fluid present within the pelvis   5.  Surgical consultation recommended               ED BEDSIDE ULTRASOUND:   Performed by ED Physician Mony Inman DO       LABS:  Labs Reviewed   CBC WITH AUTO DIFFERENTIAL - Abnormal; Notable for the following components:       Result Value    WBC 28.3 (*)     MCH 26.3 (*)     MCHC 31.7 (*)     Segs Relative 90.7 (*)     Lymphocytes % 5.2 (*)     Immature Neutrophil % 0.7 (*)     All other components within normal limits   COMPREHENSIVE METABOLIC PANEL W/ REFLEX TO MG FOR LOW K - Abnormal; Notable for the following components:    Sodium 128 (*)     Chloride 92 (*)     Glucose 318 (*)     AST 12 (*)     All other components within normal limits   CULTURE, URINE   LIPASE   URINE DRUG SCREEN   URINALYSIS       All other labs were within normal range or not returned as of this dictation. EMERGENCY DEPARTMENT COURSE and DIFFERENTIAL DIAGNOSIS/MDM:   Vitals:    Vitals:    02/17/21 2209 02/17/21 2258   BP: 120/73    Pulse: 101    Resp: 17    Temp: 98.2 °F (36.8 °C)    TempSrc: Oral    SpO2: 98%    Weight:  200 lb (90.7 kg)   Height:  6' 4\" (1.93 m)           MDM  Number of Diagnoses or Management Options  Diagnosis management comments: 82-year-old male presents emergency department with chief complaint of right upper and right lower quadrant abdominal pain. Patient also reports nausea, vomiting, constipation. Lab work was remarkable for leukocytosis of 28. CT the abdomen pelvis shows multiple masses within the mesentery that are necrotic in origin, possibly cancerous. Patient was given vancomycin and Zosyn. Discussed the case with general surgery who recommended admission with interventional radiology consultation for biopsy of masses. Vital signs are normal and stable at this time. Patient was made aware of these findings. Discussed the case with hospitalist service who will admit this patient. Amount and/or Complexity of Data Reviewed  Clinical lab tests: ordered and reviewed  Tests in the radiology section of CPT®: ordered and reviewed  Tests in the medicine section of CPT®: ordered and reviewed    Risk of Complications, Morbidity, and/or Mortality  Presenting problems: moderate  Diagnostic procedures: moderate  Management options: moderate    Critical Care  Total time providing critical care: < 30 minutes    -  Patient seen and evaluated in the emergency department. -  Triage and nursing notes reviewed and incorporated. -  Old chart records reviewed and incorporated. -  Work-up included:  See above  -  Results discussed with patient. REASSESSMENT          CRITICAL CARE TIME     This excludes seperately billable procedures and family discussion time.  Critical care time provided for obtaining history, conducting a physical exam, performing and monitoring [facility]

## 2021-02-18 NOTE — ED NOTES
Pt back from CT and reports needing to urinate. Urinal provided.       Krzysztof Stanton RN  02/18/21 0030

## 2021-02-18 NOTE — ED NOTES
Report given to Sandeep BURTON on floor. Pt to be transferred to floor.       Js Luna RN  02/18/21 9651

## 2021-02-18 NOTE — PLAN OF CARE
Problem: Pain:  Goal: Pain level will decrease  Description: Pain level will decrease  2/18/2021 1018 by Yessi Hilton RN  Outcome: Ongoing  2/18/2021 1018 by Yessi Hilton RN  Outcome: Ongoing  Goal: Control of acute pain  Description: Control of acute pain  2/18/2021 1018 by Yessi Hilton RN  Outcome: Ongoing  2/18/2021 1018 by Yessi Hilton RN  Outcome: Ongoing  Goal: Control of chronic pain  Description: Control of chronic pain  2/18/2021 1018 by Yessi Hilton RN  Outcome: Ongoing  2/18/2021 1018 by Yessi Hilton RN  Outcome: Ongoing

## 2021-02-18 NOTE — CONSULTS
SURGICAL CONSULTATION    Date of Admission:  2/17/2021  Date of Consultation:  2/18/2021    PCP:  No primary care provider on file. Thank you Dr. Beth Baron, DO very much for your consultation, it's always appreciated. I had the privilege today to see your patient Dana Celeste. Chief Complaint / History of Present Illness:  Dana Celeste is a very pleasant 23 y.o. male who presents with pain in the entire abdomen. Stacey Potter and is chronic, worsening and dull compared to patient's normal condition. It is severe in intensity for a duration of 4 months and is intermittent with modifying factors increased by or worse with eating. .    Mesenteric masses, loss of weight, 30 Lbs over the past 4 months. . major symptoms. . Needs IR percutaneous radiology guided biopsies, and will follow. PMH:   has a past medical history of Asthma and Diabetes mellitus ([address] PSH:   has no past surgical history on file. Allergies:  No Known Allergies     Home Meds:    Prior to Admission medications    Medication Sig Start Date End Date Taking?  Authorizing Provider   metFORMIN (GLUCOPHAGE-XR) 500 MG extended release tablet  11/13/20   Historical Provider, MD   naproxen (NAPROSYN) 500 MG tablet Take 1 tablet by mouth 2 times daily 9/9/19   René Larios PA-C   acetaminophen (APAP EXTRA STRENGTH) 500 MG tablet Take 1 tablet by mouth every 6 hours as needed for Pain 8/11/18   ESTER Urbina        [facility] Meds:    Current Facility-Administered Medications   Medication Dose Route Frequency Provider Last Rate Last Admin    sodium chloride flush 0.9 % injection 10 mL  10 mL Intravenous 2 times per day Jerald Fritz MD        sodium chloride flush 0.9 % injection 10 mL  10 mL Intravenous PRN Jerald Fritz MD        promethazine (PHENERGAN) tablet 12.5 mg  12.5 mg Oral Q6H PRN Jerald Fritz MD        Or    ondansetron (ZOFRAN) injection 4 mg  4 mg Intravenous Q6H PRN Jerald Fritz MD  polyethylene glycol (GLYCOLAX) packet 17 g  17 g Oral Daily PRN Jerald Fritz MD        acetaminophen (TYLENOL) tablet 650 mg  650 mg Oral Q6H PRN Jerald Fritz MD        Or   Melissa Corrigan acetaminophen (TYLENOL) suppository 650 mg  650 mg Rectal Q6H PRN Jerald Fritz MD        0.9 % sodium chloride infusion   Intravenous Continuous Jerald Fritz  mL/hr at 02/18/21 0320 New Bag at 02/18/21 0320    insulin lispro (HUMALOG) injection vial 0-12 Units  0-12 Units Subcutaneous 4x Daily  Jerald Fritz MD        glucose (GLUTOSE) 40 % oral gel 15 g  15 g Oral PRN Jerald Fritz MD        dextrose 50 % IV solution  12.5 g Intravenous PRN Jerald Fritz MD        glucagon (rDNA) injection 1 mg  1 mg Intramuscular PRN Jerald Fritz MD        dextrose 5 % solution  100 mL/hr Intravenous PRN Jerald Fritz MD          sodium chloride 125 mL/hr at 02/18/21 0320    dextrose         Social History / Family History:     Social History     Socioeconomic History    Marital status: Single     Spouse name: None    Number of children: None    Years of education: None    Highest education level: None   Occupational History    None   Social Needs    Financial resource strain: None    Food insecurity     Worry: None     Inability: None    Transportation needs     Medical: None     Non-medical: None   Tobacco Use    Smoking status: Current Every Day Smoker     Packs/day: 1.00     Years: 2.00     Pack years: 2.00     Types: Cigarettes    Smokeless tobacco: Never Used   Substance and Sexual Activity    Alcohol use: No    Drug use: No    Sexual activity: None   Lifestyle    Physical activity     Days per week: None     Minutes per session: None    Stress: None   Relationships    Social connections     Talks on phone: None     Gets together: None     Attends Tenriism service: None     Active member of club or organization: None     Attends meetings of clubs or organizations: None     Relationship status: None    Intimate partner violence     Fear of current or ex partner: None     Emotionally abused: None     Physically abused: None     Forced sexual activity: None   Other Topics Concern    None   Social History Narrative    None    History reviewed. No pertinent family history. otherwise irrelevant to this surgical issue. Review of Systems:  Constitutional: Negative for fever, chills, diaphoresis, appetite change and fatigue. HENT: Negative for sore throat, trouble swallowing and voice change. Respiratory: Negative for cough, positive for shortness of breath no wheezing. Cardiovascular: Negative for chest pain positive for SOB with one flight of stairs exertion, no pitting LE edema. Gastrointestinal: Positive for severe abdominal pain, Negative for nausea, vomiting, abdominal distention, positive constipation, no diarrhea, blood in stool, anal bleeding or rectal pain. Musculoskeletal: Negative for joint swelling and arthralgias. Skin: Warm and dry, well perfused. Neurological: Negative for seizures, syncope, speech difficulty and weakness. Hematological/Lymphatic: Negative for adenopathy. No history of DVT/PE. Does not bruise/bleed easily. Psychiatric/Behavioral: Negative for agitation. All others reviewed and negative. Physical Exam:  Vital Signs:   Vitals:    02/18/21 0249   BP: 118/70   Pulse: 109   Resp: 16   Temp: 100 °F (37.8 °C)   SpO2: 96%     Body mass index is 24.34 kg/m². General appearance: Pt Alert and oriented, in no apparent acute distress. HEENT:  KELBY, Conjunctiva clear. EOMI, No JVDs, Bruits, Megalies: neither thyroid nor lymph nodes. Lungs: Clear to auscultation bilaterally. Heart: Regular rate and rhythm, S1, S2 normal, no murmur, rub or gallop. Abdomen: Extremely tender all over but no peritoneal signs. Non distended. Positive bowel sounds. No hernias noted, no masses palpable.   Extremities: Warm, well perfused, no cyanosis or edema. Skin: Skin color, texture, turgor normal.  Neurologic: Grossly normal, Cranial nerves from II to XII intact, Deep tendon reflexes normal.  Lymph nodes: No palpable LNs, Cervical, groins, abdominal.  Musculoskeletal: Bilateral Upper and lower extremities ROM within normal limits. Radiologic / Imaging / TESTING  CT yesterday:  Impression   1. Abnormal wall thickening involving the gastric antrum, duodenal C-loop,   and proximal jejunum, which may be inflammatory or neoplastic in etiology. 2. Approximate 4.3 x 3.7 cm heterogeneous \"mass\", within the mesentery, with   surrounding inflammation.  Differential considerations would include a   necrotic neoplasm, necrotic adenopathy related to lymphoma or metastatic   disease, or an infectious process.  An additional 2.2 x 2.1 cm mass with   central low attenuation is present more caudally within the mesentery.  No   air bubbles are present within these collections to suggest abscesses. Multiple additional mesenteric lymph nodes are present. 3. 1.8 cm cystic lesion within the tail the pancreas, and may represent   sequela of prior pancreatitis. 4. Moderate amount of free fluid present within the pelvis   5.  Surgical consultation recommended        Labs:    Recent Results (from the past 24 hour(s))   CBC Auto Differential    Collection Time: 02/17/21 10:55 PM   Result Value Ref Range    WBC 28.3 (H) 4.0 - 10.5 K/CU MM    RBC 5.74 4.6 - 6.2 M/CU MM    Hemoglobin 15.1 13.5 - 18.0 GM/DL    Hematocrit 47.7 42 - 52 %    MCV 83.1 78 - 100 FL    MCH 26.3 (L) 27 - 31 PG    MCHC 31.7 (L) 32.0 - 36.0 %    RDW 11.7 11.7 - 14.9 %    Platelets 611 133 - 203 K/CU MM    MPV 9.7 7.5 - 11.1 FL    Differential Type AUTOMATED DIFFERENTIAL     Segs Relative 90.7 (H) 36 - 66 %    Lymphocytes % 5.2 (L) 24 - 44 %    Monocytes % 3.0 0 - 4 %    Eosinophils % 0.2 0 - 3 %    Basophils % 0.2 0 - 1 %    Segs Absolute 25.6 K/CU MM    Lymphocytes Absolute 1.5 K/CU MM    Monocytes Absolute 0.9 K/CU MM    Eosinophils Absolute 0.1 K/CU MM    Basophils Absolute 0.1 K/CU MM    Nucleated RBC % 0.0 %    Total Nucleated RBC 0.0 K/CU MM    Total Immature Neutrophil 0.20 K/CU MM    Immature Neutrophil % 0.7 (H) 0 - 0.43 %   Comprehensive Metabolic Panel w/ Reflex to MG    Collection Time: 02/17/21 10:55 PM   Result Value Ref Range    Sodium 128 (L) 135 - 145 MMOL/L    Potassium 3.9 3.5 - 5.1 MMOL/L    Chloride 92 (L) 99 - 110 mMol/L    CO2 23 21 - 32 MMOL/L    BUN 13 6 - 23 MG/DL    CREATININE 0.9 0.9 - 1.3 MG/DL    Glucose 318 (H) 70 - 99 MG/DL    Calcium 9.3 8.3 - 10.6 MG/DL    Albumin 3.6 3.4 - 5.0 GM/DL    Total Protein 8.1 6.4 - 8.2 GM/DL    Total Bilirubin 0.5 0.0 - 1.0 MG/DL    ALT 11 10 - 40 U/L    AST 12 (L) 15 - 37 IU/L    Alkaline Phosphatase 88 40 - 129 IU/L    GFR Non-African American >60 >60 mL/min/1.73m2    GFR African American >60 >60 mL/min/1.73m2    Anion Gap 13 4 - 16   Lipase    Collection Time: 02/17/21 10:55 PM   Result Value Ref Range    Lipase 32 13 - 60 IU/L   Urinalysis, reflex to microscopic    Collection Time: 02/18/21 12:43 AM   Result Value Ref Range    Color, UA YELLOW YELLOW    Clarity, UA CLEAR CLEAR    Glucose, Urine >500 (A) NEGATIVE MG/DL    Bilirubin Urine NEGATIVE NEGATIVE MG/DL    Ketones, Urine LARGE (A) NEGATIVE MG/DL    Specific Gravity, UA 1.035 1.001 - 1.035    Blood, Urine NEGATIVE NEGATIVE    pH, Urine 5.0 5.0 - 8.0    Protein, UA NEGATIVE NEGATIVE MG/DL    Urobilinogen, Urine NEGATIVE 0.2 - 1.0 MG/DL    Nitrite Urine, Quantitative NEGATIVE NEGATIVE    Leukocyte Esterase, Urine NEGATIVE NEGATIVE    RBC, UA NONE SEEN 0 - 3 /HPF    WBC, UA 1 0 - 2 /HPF    Bacteria, UA NEGATIVE NEGATIVE /HPF    Mucus, UA RARE (A) NEGATIVE HPF    Trichomonas, UA NONE SEEN NONE SEEN /HPF   Urine Drug Screen    Collection Time: 02/18/21 12:43 AM   Result Value Ref Range    Cannabinoid Scrn, Ur NEGATIVE NEGATIVE    Amphetamines NEGATIVE NEGATIVE    Cocaine Metabolite NEGATIVE NEGATIVE    Benzodiazepine Screen, Urine NEGATIVE NEGATIVE    Barbiturate Screen, Ur NEGATIVE NEGATIVE    Opiates, Urine NEGATIVE NEGATIVE    Phencyclidine, Urine NEGATIVE NEGATIVE    Oxycodone NEGATIVE NEGATIVE   Blood Gas, Venous    Collection Time: 02/18/21  1:30 AM   Result Value Ref Range    pH, Eduard 7.38 7.32 - 7.42    pCO2, Eduard 37 (L) 38 - 52 mmHG    pO2, Eduard 129 (H) 28 - 48 mmHG    Base Excess 3 0 - 3.3    HCO3, Venous 21.9 19 - 25 MMOL/L    O2 Sat, Eduard 94.3 (H) 50 - 70 %    Comment VBG    POCT Glucose    Collection Time: 02/18/21  1:54 AM   Result Value Ref Range    POC Glucose 275 (H) 70 - 99 MG/DL   COVID-19, Rapid    Collection Time: 02/18/21  2:01 AM    Specimen: Nasopharyngeal   Result Value Ref Range    Source THROAT     SARS-CoV-2, NAAT NOT DETECTED    POCT Glucose    Collection Time: 02/18/21  3:01 AM   Result Value Ref Range    POC Glucose 288 (H) 70 - 99 MG/DL   POCT Glucose    Collection Time: 02/18/21  4:19 AM   Result Value Ref Range    POC Glucose 265 (H) 70 - 99 MG/DL   CBC auto differential    Collection Time: 02/18/21  6:32 AM   Result Value Ref Range    WBC 21.1 (H) 4.0 - 10.5 K/CU MM    RBC 5.02 4.6 - 6.2 M/CU MM    Hemoglobin 13.4 (L) 13.5 - 18.0 GM/DL    Hematocrit 40.6 (L) 42 - 52 %    MCV 80.9 78 - 100 FL    MCH 26.7 (L) 27 - 31 PG    MCHC 33.0 32.0 - 36.0 %    RDW 11.9 11.7 - 14.9 %    Platelets 207 089 - 396 K/CU MM    MPV 10.0 7.5 - 11.1 FL    Differential Type AUTOMATED DIFFERENTIAL     Segs Relative 87.3 (H) 36 - 66 %    Lymphocytes % 7.8 (L) 24 - 44 %    Monocytes % 4.2 (H) 0 - 4 %    Eosinophils % 0.0 0 - 3 %    Basophils % 0.2 0 - 1 %    Segs Absolute 18.5 K/CU MM    Lymphocytes Absolute 1.7 K/CU MM    Monocytes Absolute 0.9 K/CU MM    Eosinophils Absolute 0.0 K/CU MM    Basophils Absolute 0.1 K/CU MM    Nucleated RBC % 0.0 %    Total Nucleated RBC 0.0 K/CU MM    Total Immature Neutrophil 0.10 K/CU MM    Immature Neutrophil % 0.5 (H) 0 - 0.43 %   Protime-INR Collection Time: 02/18/21  6:32 AM   Result Value Ref Range    Protime 14.8 (H) 11.7 - 14.5 SECONDS    INR 1.22 INDEX       Diagnosis:  Patient Active Problem List   Diagnosis    Mesenteric mass       Assessment & plan:  Unique Lucas is a very pleasant 23 y.o. male presenting with Mesenteric masses, loss of weight, 30 Lbs over the past 4 months. . major symptoms. . Needs IR percutaneous radiology guided biopsies, and will follow. Will check tumor markers - ordered. Thank you doctor FAMILIA Macias very much for your consultation and for the opportunity to take care of Mr Unique Lucas with you, I'll follow along with you and I'll update you on any new events in his care.    ____________________________________________    Iam Denton MD, FACS, FICS    2/18/2021  7:24 AM      Patient was seen with total face to face time of 45 minutes. More than 50% of this visit was counseling and education as above in my assessment and plan section of my note.

## 2021-02-18 NOTE — ED NOTES
Dr Gretchen Manrique was reached      [facility] [place]  02/18/21 010

## 2021-02-19 ENCOUNTER — APPOINTMENT (OUTPATIENT)
Dept: CT IMAGING | Age: 26
DRG: 691 | End: 2021-02-19
Payer: COMMERCIAL

## 2021-02-19 LAB
ALBUMIN SERPL-MCNC: 3 GM/DL (ref 3.4–5)
ALP BLD-CCNC: 73 IU/L (ref 40–129)
ALT SERPL-CCNC: 8 U/L (ref 10–40)
ANION GAP SERPL CALCULATED.3IONS-SCNC: 8 MMOL/L (ref 4–16)
AST SERPL-CCNC: 9 IU/L (ref 15–37)
BASOPHILS ABSOLUTE: 0.1 K/CU MM
BASOPHILS RELATIVE PERCENT: 0.3 % (ref 0–1)
BILIRUB SERPL-MCNC: 0.6 MG/DL (ref 0–1)
BUN BLDV-MCNC: 6 MG/DL (ref 6–23)
CALCIUM SERPL-MCNC: 8.5 MG/DL (ref 8.3–10.6)
CEA: 1.1 NG/ML
CHLORIDE BLD-SCNC: 98 MMOL/L (ref 99–110)
CO2: 26 MMOL/L (ref 21–32)
CREAT SERPL-MCNC: 0.9 MG/DL (ref 0.9–1.3)
CULTURE: NORMAL
DIFFERENTIAL TYPE: ABNORMAL
EOSINOPHILS ABSOLUTE: 0.1 K/CU MM
EOSINOPHILS RELATIVE PERCENT: 0.4 % (ref 0–3)
GFR AFRICAN AMERICAN: >60 ML/MIN/1.73M2
GFR NON-AFRICAN AMERICAN: >60 ML/MIN/1.73M2
GLUCOSE BLD-MCNC: 102 MG/DL (ref 70–99)
GLUCOSE BLD-MCNC: 107 MG/DL (ref 70–99)
GLUCOSE BLD-MCNC: 97 MG/DL (ref 70–99)
HCT VFR BLD CALC: 40.4 % (ref 42–52)
HEMOGLOBIN: 13 GM/DL (ref 13.5–18)
HIGH SENSITIVE C-REACTIVE PROTEIN: >300 MG/L
IMMATURE NEUTROPHIL %: 0.6 % (ref 0–0.43)
LYMPHOCYTES ABSOLUTE: 2.6 K/CU MM
LYMPHOCYTES RELATIVE PERCENT: 14.4 % (ref 24–44)
Lab: NORMAL
MCH RBC QN AUTO: 26.7 PG (ref 27–31)
MCHC RBC AUTO-ENTMCNC: 32.2 % (ref 32–36)
MCV RBC AUTO: 83 FL (ref 78–100)
MONOCYTES ABSOLUTE: 1 K/CU MM
MONOCYTES RELATIVE PERCENT: 5.5 % (ref 0–4)
NUCLEATED RBC %: 0 %
PDW BLD-RTO: 12 % (ref 11.7–14.9)
PLATELET # BLD: 294 K/CU MM (ref 140–440)
PMV BLD AUTO: 9.9 FL (ref 7.5–11.1)
POTASSIUM SERPL-SCNC: 4.1 MMOL/L (ref 3.5–5.1)
PROCALCITONIN: 4.95
RBC # BLD: 4.87 M/CU MM (ref 4.6–6.2)
SEGMENTED NEUTROPHILS ABSOLUTE COUNT: 14 K/CU MM
SEGMENTED NEUTROPHILS RELATIVE PERCENT: 78.8 % (ref 36–66)
SODIUM BLD-SCNC: 132 MMOL/L (ref 135–145)
SPECIMEN: NORMAL
TOTAL IMMATURE NEUTOROPHIL: 0.11 K/CU MM
TOTAL NUCLEATED RBC: 0 K/CU MM
TOTAL PROTEIN: 6.1 GM/DL (ref 6.4–8.2)
WBC # BLD: 17.8 K/CU MM (ref 4–10.5)

## 2021-02-19 PROCEDURE — 88341 IMHCHEM/IMCYTCHM EA ADD ANTB: CPT | Performed by: PATHOLOGY

## 2021-02-19 PROCEDURE — 0DBV4ZX EXCISION OF MESENTERY, PERCUTANEOUS ENDOSCOPIC APPROACH, DIAGNOSTIC: ICD-10-PCS | Performed by: RADIOLOGY

## 2021-02-19 PROCEDURE — 84145 PROCALCITONIN (PCT): CPT

## 2021-02-19 PROCEDURE — 87040 BLOOD CULTURE FOR BACTERIA: CPT

## 2021-02-19 PROCEDURE — 82378 CARCINOEMBRYONIC ANTIGEN: CPT

## 2021-02-19 PROCEDURE — 2709999900 HC NON-CHARGEABLE SUPPLY

## 2021-02-19 PROCEDURE — 86141 C-REACTIVE PROTEIN HS: CPT

## 2021-02-19 PROCEDURE — 88333 PATH CONSLTJ SURG CYTO XM 1: CPT | Performed by: PATHOLOGY

## 2021-02-19 PROCEDURE — 36415 COLL VENOUS BLD VENIPUNCTURE: CPT

## 2021-02-19 PROCEDURE — 85025 COMPLETE CBC W/AUTO DIFF WBC: CPT

## 2021-02-19 PROCEDURE — 2709999900 CT GUIDED NEEDLE PLACEMENT

## 2021-02-19 PROCEDURE — 82962 GLUCOSE BLOOD TEST: CPT

## 2021-02-19 PROCEDURE — 99232 SBSQ HOSP IP/OBS MODERATE 35: CPT | Performed by: SURGERY

## 2021-02-19 PROCEDURE — 80053 COMPREHEN METABOLIC PANEL: CPT

## 2021-02-19 PROCEDURE — 6360000002 HC RX W HCPCS: Performed by: INTERNAL MEDICINE

## 2021-02-19 PROCEDURE — 88342 IMHCHEM/IMCYTCHM 1ST ANTB: CPT | Performed by: PATHOLOGY

## 2021-02-19 PROCEDURE — 88307 TISSUE EXAM BY PATHOLOGIST: CPT | Performed by: PATHOLOGY

## 2021-02-19 PROCEDURE — 2580000003 HC RX 258: Performed by: INTERNAL MEDICINE

## 2021-02-19 PROCEDURE — 93005 ELECTROCARDIOGRAM TRACING: CPT | Performed by: INTERNAL MEDICINE

## 2021-02-19 PROCEDURE — 2580000003 HC RX 258: Performed by: RADIOLOGY

## 2021-02-19 PROCEDURE — 88334 PATH CONSLTJ SURG CYTO XM EA: CPT | Performed by: PATHOLOGY

## 2021-02-19 PROCEDURE — 94761 N-INVAS EAR/PLS OXIMETRY MLT: CPT

## 2021-02-19 PROCEDURE — 1200000000 HC SEMI PRIVATE

## 2021-02-19 PROCEDURE — 6370000000 HC RX 637 (ALT 250 FOR IP): Performed by: INTERNAL MEDICINE

## 2021-02-19 PROCEDURE — 6360000002 HC RX W HCPCS: Performed by: RADIOLOGY

## 2021-02-19 RX ORDER — MIDAZOLAM HYDROCHLORIDE 2 MG/2ML
1 INJECTION, SOLUTION INTRAMUSCULAR; INTRAVENOUS ONCE
Status: COMPLETED | OUTPATIENT
Start: 2021-02-19 | End: 2021-02-19

## 2021-02-19 RX ORDER — 0.9 % SODIUM CHLORIDE 0.9 %
500 INTRAVENOUS SOLUTION INTRAVENOUS ONCE
Status: COMPLETED | OUTPATIENT
Start: 2021-02-19 | End: 2021-02-19

## 2021-02-19 RX ORDER — FENTANYL CITRATE 50 UG/ML
50 INJECTION, SOLUTION INTRAMUSCULAR; INTRAVENOUS ONCE
Status: COMPLETED | OUTPATIENT
Start: 2021-02-19 | End: 2021-02-19

## 2021-02-19 RX ORDER — FENTANYL CITRATE 50 UG/ML
25 INJECTION, SOLUTION INTRAMUSCULAR; INTRAVENOUS ONCE
Status: COMPLETED | OUTPATIENT
Start: 2021-02-19 | End: 2021-02-19

## 2021-02-19 RX ADMIN — SODIUM CHLORIDE, PRESERVATIVE FREE 10 ML: 5 INJECTION INTRAVENOUS at 20:57

## 2021-02-19 RX ADMIN — ONDANSETRON 4 MG: 2 INJECTION INTRAMUSCULAR; INTRAVENOUS at 05:02

## 2021-02-19 RX ADMIN — MORPHINE SULFATE 2 MG: 2 INJECTION, SOLUTION INTRAMUSCULAR; INTRAVENOUS at 08:22

## 2021-02-19 RX ADMIN — PIPERACILLIN AND TAZOBACTAM 3375 MG: 3; .375 INJECTION, POWDER, LYOPHILIZED, FOR SOLUTION INTRAVENOUS at 16:10

## 2021-02-19 RX ADMIN — FENTANYL CITRATE 50 MCG: 50 INJECTION INTRAMUSCULAR; INTRAVENOUS at 11:33

## 2021-02-19 RX ADMIN — MIDAZOLAM HYDROCHLORIDE 1 MG: 1 INJECTION, SOLUTION INTRAMUSCULAR; INTRAVENOUS at 11:33

## 2021-02-19 RX ADMIN — MORPHINE SULFATE 2 MG: 2 INJECTION, SOLUTION INTRAMUSCULAR; INTRAVENOUS at 02:31

## 2021-02-19 RX ADMIN — ACETAMINOPHEN 650 MG: 325 TABLET ORAL at 18:20

## 2021-02-19 RX ADMIN — FENTANYL CITRATE 25 MCG: 50 INJECTION INTRAMUSCULAR; INTRAVENOUS at 11:39

## 2021-02-19 RX ADMIN — SODIUM CHLORIDE 500 ML: 9 INJECTION, SOLUTION INTRAVENOUS at 11:30

## 2021-02-19 RX ADMIN — MORPHINE SULFATE 2 MG: 2 INJECTION, SOLUTION INTRAMUSCULAR; INTRAVENOUS at 17:43

## 2021-02-19 RX ADMIN — PIPERACILLIN AND TAZOBACTAM 3375 MG: 3; .375 INJECTION, POWDER, LYOPHILIZED, FOR SOLUTION INTRAVENOUS at 23:15

## 2021-02-19 ASSESSMENT — PAIN DESCRIPTION - PAIN TYPE
TYPE: ACUTE PAIN
TYPE: ACUTE PAIN

## 2021-02-19 ASSESSMENT — PAIN SCALES - GENERAL
PAINLEVEL_OUTOF10: 5
PAINLEVEL_OUTOF10: 5
PAINLEVEL_OUTOF10: 8
PAINLEVEL_OUTOF10: 8
PAINLEVEL_OUTOF10: 3

## 2021-02-19 ASSESSMENT — PAIN - FUNCTIONAL ASSESSMENT: PAIN_FUNCTIONAL_ASSESSMENT: 0-10

## 2021-02-19 ASSESSMENT — PAIN DESCRIPTION - ONSET: ONSET: ON-GOING

## 2021-02-19 ASSESSMENT — PAIN DESCRIPTION - FREQUENCY: FREQUENCY: INTERMITTENT

## 2021-02-19 ASSESSMENT — PAIN DESCRIPTION - PROGRESSION: CLINICAL_PROGRESSION: NOT CHANGED

## 2021-02-19 NOTE — PROGRESS NOTES
Hospitalist Progress Note      Name:  Oliiva Leblanc /Age/Sex: 1995  (23 y.o. male)   MRN & CSN:  [id number] & [id number] Admission Date/Time: 2021 10:04 PM   Location:  [address] [place] [address] PCP: No primary care provider on file. Hospital Day: 3    Assessment and Plan:   Olivia Leblanc is a 23 y.o.  male  who presents with  Abdominal pain. · Mesenteric mass, concern for malignancy  · Constipation  CT abdomen/pelvis concerning for mesenteric masses   General surgery on board, tumor markers sent  IR on board for biopsy 2021  IV fluids, analgesics , antiemetics   Laxatives    · Unintentional weight loss, possible malignancy  · Moderate protein energy malnutrition  Being worked up  Nutritional supplements as soon as able to take orally  We will place dietitian    · Sepsis from possible intra-abdominal infection   Meet SIRS criteria  Procalcitonin and CRP elevated  Check blood cultures  UA negative  Received IV Vanco, Zosyn in ED  Resume IV Zosyn empirically    · Pseudohyponatremia, likely due to hypoglycemia, corrected within normal limits, monitor  IV fluids    · Diabetes mellitus type 2, poor control  Not on any medications  A1c-7.3  Sliding scale, endocrine consult placed      Updated parents on clinical course    Diet DIET CLEAR LIQUID;   DVT Prophylaxis [] Lovenox, []  Heparin, [] SCDs, []No VTE prophylaxis, patient ambulating   GI Prophylaxis [] PPI, [] H2 Blocker, [] No GI prophylaxis, patient is receiving diet/Tube Feeds   Code Status Full Code   Disposition Patient requires continued admission due to    MDM [] Low, [] Moderate,[]  High  Patient's risk as above due to      History of Present Illness:     Pt S&E.    Still not moved bowel, abdominal pain improved, no nausea or vomiting  10-14 point ROS reviewed negative, unless as noted above    Objective:   No intake or output data in the 24 hours ending 21 1446   Vitals:   Vitals:    21 1151   BP: 119/65   Pulse: 117   Resp: 14   Temp:    SpO2: 96%     Physical Exam:    GEN Awake male, sitting upright in bed in no apparent distress. Appears given age. EYES Pupils are equally round. No scleral erythema, discharge, or conjunctivitis. HENT Mucous membranes are moist.   NECK No apparent thyromegaly or masses. RESP Clear to auscultation, no wheezes, rales or rhonchi. Symmetric chest movement while on room air. CARDIO/VASC S1/S2 auscultated. Regular rate without appreciable murmurs, rubs, or gallops. Peripheral pulses equal bilaterally and palpable. No peripheral edema. GI right lower quadrant pain, no masses palpable or guarding. Bowel sounds are normoactive. Rectal exam deferred.  Su catheter is not present. HEME/LYMPH No petechiae or ecchymoses. MSK No gross joint deformities. Spontaneous movement of all extremities  SKIN Normal coloration, warm, dry. NEURO Cranial nerves appear grossly intact, normal speech, no lateralizing weakness. PSYCH Awake, alert, oriented x 4. Affect appropriate.     Medications:   Medications:    piperacillin-tazobactam  3,375 mg Intravenous Q8H    sodium chloride flush  10 mL Intravenous 2 times per day    insulin lispro  0-12 Units Subcutaneous 4x Daily WC      Infusions:    sodium chloride 125 mL/hr at 02/18/21 0320    dextrose       PRN Meds:     sodium chloride flush, 10 mL, PRN      promethazine, 12.5 mg, Q6H PRN    Or      ondansetron, 4 mg, Q6H PRN      acetaminophen, 650 mg, Q6H PRN    Or      acetaminophen, 650 mg, Q6H PRN      glucose, 15 g, PRN      dextrose, 12.5 g, PRN      glucagon (rDNA), 1 mg, PRN      dextrose, 100 mL/hr, PRN      polyethylene glycol, 17 g, Daily PRN      morphine, 2 mg, Q4H PRN        Data    Recent Labs     02/17/21  2255 02/18/21  0632 02/19/21  0534   WBC 28.3* 21.1* 17.8*   HGB 15.1 13.4* 13.0*   HCT 47.7 40.6* 40.4*    302 294      Recent Labs     02/17/21  2255 02/18/21  0632 02/19/21  0534   * 131* 132*   K 3.9 4.3 4.1   CL 92* 97* 98*   CO2 23 26 26   BUN 13 9 6   CREATININE 0.9 0.9 0.9     Recent Labs     02/17/21  2255 02/19/21  0534   AST 12* 9*   ALT 11 8*   BILITOT 0.5 0.6   ALKPHOS 88 73     Recent Labs     02/18/21  0632   INR 1.22     No results for input(s): CKTOTAL, CKMB, CKMBINDEX, TROPONINI in the last 72 hours.         Electronically signed by Tony Jaquez MD on 2/19/2021 at 2:46 PM

## 2021-02-19 NOTE — PROGRESS NOTES
LARGE IR PROCEDURES      PROCEDURE PERFORMED: Mesenteric Biopsy    PRIMARY INDICATION FOR PROCEDURE:  Mass    INFORMED CONSENT:  PT A&O, verbalizes understanding of the procedure, signed consent with Dr. Tyson Wild. Consent placed in chart. BARRIER PRECAUTIONS & STERILE TECHNIQUE  Pt transferred to the table and positioned for comfort. Warm blankets given. Pt placed on Cardiac Monitor. Pt in the supine position. Chlorhexadine and draped in a sterile fashion. TIME OUT COMPLETE: 1132    1133 Sedation medication given by [facility] RN     1139 Sedation medication given by Bella BURTON    PAIN/LOCAL ANESTHESIA/SEDATION MANAGEMENT:  Lidocaine, Fentanyl and Midazolam    OUTCOME: Mesenteric Biopsy done by Dr Tyson Wild. 18g x 15 cm biopsy instrument used. STERILE DRESSINGS:  Applied    SPECIMENS:  Collected: Passes were made and 7 cores of tissue were obtained.     COMPLICATIONS:  none    STAFF PRESENT DURING PROCEDURE:   Dr King Helms RN, Sharath Mercado RN    REPORT CALLED TO: Radha Barros, RN 4N   Pt transported back to room [phone]

## 2021-02-19 NOTE — PROGRESS NOTES
GENERAL SURGERY PROGRESS NOTE    CC/HPI:           Patient feels the same diffuse abdominal pain, . Vitals:    02/19/21 1142 02/19/21 1146 02/19/21 1151 02/19/21 1528   BP: 120/67 121/63 119/65    Pulse: 122 117 117    Resp: 17 16 14    Temp:       TempSrc:       SpO2: 96% 97% 96% 93%   Weight:       Height:         No intake/output data recorded. No intake/output data recorded.     DIET CLEAR LIQUID;    Recent Results (from the past 48 hour(s))   CBC Auto Differential    Collection Time: 02/17/21 10:55 PM   Result Value Ref Range    WBC 28.3 (H) 4.0 - 10.5 K/CU MM    RBC 5.74 4.6 - 6.2 M/CU MM    Hemoglobin 15.1 13.5 - 18.0 GM/DL    Hematocrit 47.7 42 - 52 %    MCV 83.1 78 - 100 FL    MCH 26.3 (L) 27 - 31 PG    MCHC 31.7 (L) 32.0 - 36.0 %    RDW 11.7 11.7 - 14.9 %    Platelets 225 949 - 624 K/CU MM    MPV 9.7 7.5 - 11.1 FL    Differential Type AUTOMATED DIFFERENTIAL     Segs Relative 90.7 (H) 36 - 66 %    Lymphocytes % 5.2 (L) 24 - 44 %    Monocytes % 3.0 0 - 4 %    Eosinophils % 0.2 0 - 3 %    Basophils % 0.2 0 - 1 %    Segs Absolute 25.6 K/CU MM    Lymphocytes Absolute 1.5 K/CU MM    Monocytes Absolute 0.9 K/CU MM    Eosinophils Absolute 0.1 K/CU MM    Basophils Absolute 0.1 K/CU MM    Nucleated RBC % 0.0 %    Total Nucleated RBC 0.0 K/CU MM    Total Immature Neutrophil 0.20 K/CU MM    Immature Neutrophil % 0.7 (H) 0 - 0.43 %   Comprehensive Metabolic Panel w/ Reflex to MG    Collection Time: 02/17/21 10:55 PM   Result Value Ref Range    Sodium 128 (L) 135 - 145 MMOL/L    Potassium 3.9 3.5 - 5.1 MMOL/L    Chloride 92 (L) 99 - 110 mMol/L    CO2 23 21 - 32 MMOL/L    BUN 13 6 - 23 MG/DL    CREATININE 0.9 0.9 - 1.3 MG/DL    Glucose 318 (H) 70 - 99 MG/DL    Calcium 9.3 8.3 - 10.6 MG/DL    Albumin 3.6 3.4 - 5.0 GM/DL    Total Protein 8.1 6.4 - 8.2 GM/DL    Total Bilirubin 0.5 0.0 - 1.0 MG/DL    ALT 11 10 - 40 U/L    AST 12 (L) 15 - 37 IU/L    Alkaline Phosphatase 88 40 - 129 IU/L    GFR Non- American >60 >60 mL/min/1.73m2    GFR African American >60 >60 mL/min/1.73m2    Anion Gap 13 4 - 16   Lipase    Collection Time: 02/17/21 10:55 PM   Result Value Ref Range    Lipase 32 13 - 60 IU/L   Urinalysis, reflex to microscopic    Collection Time: 02/18/21 12:43 AM   Result Value Ref Range    Color, UA YELLOW YELLOW    Clarity, UA CLEAR CLEAR    Glucose, Urine >500 (A) NEGATIVE MG/DL    Bilirubin Urine NEGATIVE NEGATIVE MG/DL    Ketones, Urine LARGE (A) NEGATIVE MG/DL    Specific Gravity, UA 1.035 1.001 - 1.035    Blood, Urine NEGATIVE NEGATIVE    pH, Urine 5.0 5.0 - 8.0    Protein, UA NEGATIVE NEGATIVE MG/DL    Urobilinogen, Urine NEGATIVE 0.2 - 1.0 MG/DL    Nitrite Urine, Quantitative NEGATIVE NEGATIVE    Leukocyte Esterase, Urine NEGATIVE NEGATIVE    RBC, UA NONE SEEN 0 - 3 /HPF    WBC, UA 1 0 - 2 /HPF    Bacteria, UA NEGATIVE NEGATIVE /HPF    Mucus, UA RARE (A) NEGATIVE HPF    Trichomonas, UA NONE SEEN NONE SEEN /HPF   Culture, Urine    Collection Time: 02/18/21 12:43 AM    Specimen: Urine, clean catch   Result Value Ref Range    Specimen URINE CLEAN CATCH     Special Requests NONE     Culture Final Report No growth at 18 to 36 hours    Urine Drug Screen    Collection Time: 02/18/21 12:43 AM   Result Value Ref Range    Cannabinoid Scrn, Ur NEGATIVE NEGATIVE    Amphetamines NEGATIVE NEGATIVE    Cocaine Metabolite NEGATIVE NEGATIVE    Benzodiazepine Screen, Urine NEGATIVE NEGATIVE    Barbiturate Screen, Ur NEGATIVE NEGATIVE    Opiates, Urine NEGATIVE NEGATIVE    Phencyclidine, Urine NEGATIVE NEGATIVE    Oxycodone NEGATIVE NEGATIVE   Blood Gas, Venous    Collection Time: 02/18/21  1:30 AM   Result Value Ref Range    pH, Eduard 7.38 7.32 - 7.42    pCO2, Eduard 37 (L) 38 - 52 mmHG    pO2, Eduard 129 (H) 28 - 48 mmHG    Base Excess 3 0 - 3.3    HCO3, Venous 21.9 19 - 25 MMOL/L    O2 Sat, Eduard 94.3 (H) 50 - 70 %    Comment VBG    POCT Glucose    Collection Time: 02/18/21  1:54 AM   Result Value Ref Range    POC Glucose 275 (H) 70 - 99 MG/DL   COVID-19, Rapid    Collection Time: 02/18/21  2:01 AM    Specimen: Nasopharyngeal   Result Value Ref Range    Source THROAT     SARS-CoV-2, NAAT NOT DETECTED    POCT Glucose    Collection Time: 02/18/21  3:01 AM   Result Value Ref Range    POC Glucose 288 (H) 70 - 99 MG/DL   POCT Glucose    Collection Time: 02/18/21  4:19 AM   Result Value Ref Range    POC Glucose 265 (H) 70 - 99 MG/DL   Basic Metabolic Panel w/ Reflex to MG    Collection Time: 02/18/21  6:32 AM   Result Value Ref Range    Sodium 131 (L) 135 - 145 MMOL/L    Potassium 4.3 3.5 - 5.1 MMOL/L    Chloride 97 (L) 99 - 110 mMol/L    CO2 26 21 - 32 MMOL/L    Anion Gap 8 4 - 16    BUN 9 6 - 23 MG/DL    CREATININE 0.9 0.9 - 1.3 MG/DL    Glucose 208 (H) 70 - 99 MG/DL    Calcium 8.7 8.3 - 10.6 MG/DL    GFR Non-African American >60 >60 mL/min/1.73m2    GFR African American >60 >60 mL/min/1.73m2   CBC auto differential    Collection Time: 02/18/21  6:32 AM   Result Value Ref Range    WBC 21.1 (H) 4.0 - 10.5 K/CU MM    RBC 5.02 4.6 - 6.2 M/CU MM    Hemoglobin 13.4 (L) 13.5 - 18.0 GM/DL    Hematocrit 40.6 (L) 42 - 52 %    MCV 80.9 78 - 100 FL    MCH 26.7 (L) 27 - 31 PG    MCHC 33.0 32.0 - 36.0 %    RDW 11.9 11.7 - 14.9 %    Platelets 963 387 - 686 K/CU MM    MPV 10.0 7.5 - 11.1 FL    Differential Type AUTOMATED DIFFERENTIAL     Segs Relative 87.3 (H) 36 - 66 %    Lymphocytes % 7.8 (L) 24 - 44 %    Monocytes % 4.2 (H) 0 - 4 %    Eosinophils % 0.0 0 - 3 %    Basophils % 0.2 0 - 1 %    Segs Absolute 18.5 K/CU MM    Lymphocytes Absolute 1.7 K/CU MM    Monocytes Absolute 0.9 K/CU MM    Eosinophils Absolute 0.0 K/CU MM    Basophils Absolute 0.1 K/CU MM    Nucleated RBC % 0.0 %    Total Nucleated RBC 0.0 K/CU MM    Total Immature Neutrophil 0.10 K/CU MM    Immature Neutrophil % 0.5 (H) 0 - 0.43 %   Protime-INR    Collection Time: 02/18/21  6:32 AM   Result Value Ref Range    Protime 14.8 (H) 11.7 - 14.5 SECONDS    INR 1.22 INDEX   Hemoglobin A1c Collection Time: 02/18/21  6:32 AM   Result Value Ref Range    Hemoglobin A1C 7.3 (H) 4.2 - 6.3 %    eAG 163 mg/dL   POCT Glucose    Collection Time: 02/18/21  8:27 PM   Result Value Ref Range    POC Glucose 144 (H) 70 - 99 MG/DL   POCT Glucose    Collection Time: 02/19/21  2:27 AM   Result Value Ref Range    POC Glucose 102 (H) 70 - 99 MG/DL   CBC auto differential    Collection Time: 02/19/21  5:34 AM   Result Value Ref Range    WBC 17.8 (H) 4.0 - 10.5 K/CU MM    RBC 4.87 4.6 - 6.2 M/CU MM    Hemoglobin 13.0 (L) 13.5 - 18.0 GM/DL    Hematocrit 40.4 (L) 42 - 52 %    MCV 83.0 78 - 100 FL    MCH 26.7 (L) 27 - 31 PG    MCHC 32.2 32.0 - 36.0 %    RDW 12.0 11.7 - 14.9 %    Platelets 558 044 - 679 K/CU MM    MPV 9.9 7.5 - 11.1 FL    Differential Type AUTOMATED DIFFERENTIAL     Segs Relative 78.8 (H) 36 - 66 %    Lymphocytes % 14.4 (L) 24 - 44 %    Monocytes % 5.5 (H) 0 - 4 %    Eosinophils % 0.4 0 - 3 %    Basophils % 0.3 0 - 1 %    Segs Absolute 14.0 K/CU MM    Lymphocytes Absolute 2.6 K/CU MM    Monocytes Absolute 1.0 K/CU MM    Eosinophils Absolute 0.1 K/CU MM    Basophils Absolute 0.1 K/CU MM    Nucleated RBC % 0.0 %    Total Nucleated RBC 0.0 K/CU MM    Total Immature Neutrophil 0.11 K/CU MM    Immature Neutrophil % 0.6 (H) 0 - 0.43 %   Comprehensive Metabolic Panel w/ Reflex to MG    Collection Time: 02/19/21  5:34 AM   Result Value Ref Range    Sodium 132 (L) 135 - 145 MMOL/L    Potassium 4.1 3.5 - 5.1 MMOL/L    Chloride 98 (L) 99 - 110 mMol/L    CO2 26 21 - 32 MMOL/L    BUN 6 6 - 23 MG/DL    CREATININE 0.9 0.9 - 1.3 MG/DL    Glucose 97 70 - 99 MG/DL    Calcium 8.5 8.3 - 10.6 MG/DL    Albumin 3.0 (L) 3.4 - 5.0 GM/DL    Total Protein 6.1 (L) 6.4 - 8.2 GM/DL    Total Bilirubin 0.6 0.0 - 1.0 MG/DL    ALT 8 (L) 10 - 40 U/L    AST 9 (L) 15 - 37 IU/L    Alkaline Phosphatase 73 40 - 129 IU/L    GFR Non-African American >60 >60 mL/min/1.73m2    GFR African American >60 >60 mL/min/1.73m2    Anion Gap 8 4 - 16   CEA Collection Time: 02/19/21  5:34 AM   Result Value Ref Range    CEA 1.1 NG/ML   C-Reactive Protein    Collection Time: 02/19/21  5:34 AM   Result Value Ref Range    CRP, High Sensitivity >300.0 mg/L   Procalcitonin    Collection Time: 02/19/21  5:34 AM   Result Value Ref Range    Procalcitonin 4.95        Scheduled Meds:   piperacillin-tazobactam  3,375 mg Intravenous Q8H    sodium chloride flush  10 mL Intravenous 2 times per day    insulin lispro  0-12 Units Subcutaneous 4x Daily WC       Continuous Infusions:   sodium chloride 125 mL/hr at 02/18/21 0320    dextrose         Physical Exam:  HEENT: Anicteric sclerae, Oropharyngeal mucosae moist, pink and intact. Heart:  Normal S1 and S2, RRR  Lungs: Clear to auscultation bilaterally, No audible Wheezes or Rales. Extremities: No edema. Neuro: Alert and Oriented x 3, Non focal.  Abdomen: Soft, Benign, Diffusely tender, Non distended, Positive bowel sounds. Active Problems:    Mesenteric mass  Resolved Problems:    * No resolved hospital problems. *      Assessment and Plan:  Junius Hammans is a 23 y.o. male with Mesenteric masses, loss of weight, 30 Lbs over the past 4 months. . major symptoms. . Needs IR percutaneous radiology guided biopsies, and will follow. Will check tumor markers - ordered.     Awaiting IR biopsies, to determine management.    ___________________________________________    Dena Venegas MD, FACS, FICS  2/19/2021  4:41 PM

## 2021-02-19 NOTE — CONSULTS
Endocrinology   Consult Note      Dear Doctor  Partha Guzman    Thank You for the Consult     Pt. Was Admitted for : Abdominal pain    Reason for Consult: Better control of blood glucose      History Obtained From:  Patient/ EMR       HISTORY OF PRESENT ILLNESS:                The patient is a 23 y.o. male with significant past medical history of asthma, type 2 diabetes mellitus on oral hypoglycemic drugs comes in complaining of severe abdominal pain without any nausea vomiting or diarrhea the pain central dull ache and has lost weight about 100 pounds since last 4 years he was prescribed Metformin for diabetes but it causes diarrhea so he stopped taking them and watching his diet to control of blood glucose. I was  consulted for better control of blood glucose. ROS:   Pt's ROS done in detail. Abnormal ROS are noted in Medical and Surgical History Section below: Other Medical History:        Diagnosis Date    Asthma     Diabetes mellitus ([facility] [address]      Surgical History:    History reviewed. No pertinent surgical history. Allergies:  Patient has no known allergies. Family History:   History reviewed. No pertinent family history. REVIEW OF SYSTEMS:  Review of System Done as noted above     PHYSICAL EXAM:      Vitals:    /70   Pulse 93   Temp 97.9 °F (36.6 °C) (Oral)   Resp 18   Ht 6' 4\" (1.93 m)   Wt 200 lb (90.7 kg)   SpO2 96%   BMI 24.34 kg/m²     CONSTITUTIONAL:  awake, alert, cooperative, appears stated age   EYES:  vision intact Fundoscopic Exam not performed   ENT:Normal  NECK:  Supple, No JVD.    Thyroid Exam:Normal   LUNGS:  Has Vesicular Breath Sounds,   CARDIOVASCULAR:  Normal apical impulse, regular rate and rhythm, normal S1 and S2, no S3 or S4, and has no  murmur   ABDOMEN:  No scars, normal bowel sounds, soft, non-distended, mild-tender, no masses palpated, no hepatolienomegaly  Musculoskeletal: Normal  Extremities: Normal, peripheral pulses normal, , has no edema   NEUROLOGIC: Awake, alert, oriented to name, place and time. Cranial nerves II-XII are grossly intact. Motor is  intact. Sensory is intact. ,  and gait is normal.    DATA:    CBC:   Recent Labs     02/17/21  2255 02/18/21  0632   WBC 28.3* 21.1*   HGB 15.1 13.4*    302    CMP:  Recent Labs     02/17/21  2255 02/18/21  0632   * 131*   K 3.9 4.3   CL 92* 97*   CO2 23 26   BUN 13 9   CREATININE 0.9 0.9   CALCIUM 9.3 8.7   PROT 8.1  --    LABALBU 3.6  --    BILITOT 0.5  --    ALKPHOS 88  --    AST 12*  --    ALT 11  --      Lipids:   Lab Results   Component Value Date    HDL 29 11/07/2020     Glucose:   Recent Labs     02/18/21  0301 02/18/21  0419 02/18/21 2027   POCGLU 288* 265* 144*     Hemoglobin A1C:   Lab Results   Component Value Date    LABA1C 7.3 02/18/2021     Free T4: No results found for: T4FREE  Free T3: No results found for: FT3  TSH High Sensitivity: No results found for: TSHHS    Ct Abdomen Pelvis W Iv Contrast Additional Contrast? None    Result Date: 2/18/2021  EXAMINATION: CT OF THE ABDOMEN AND PELVIS WITH CONTRAST 2/17/2021     1. Abnormal wall thickening involving the gastric antrum, duodenal C-loop, and proximal jejunum, which may be inflammatory or neoplastic in etiology. 2. Approximate 4.3 x 3.7 cm heterogeneous \"mass\", within the mesentery, with surrounding inflammation. Differential considerations would include a necrotic neoplasm, necrotic adenopathy related to lymphoma or metastatic disease, or an infectious process. An additional 2.2 x 2.1 cm mass with central low attenuation is present more caudally within the mesentery. No air bubbles are present within these collections to suggest abscesses. Multiple additional mesenteric lymph nodes are present. 3. 1.8 cm cystic lesion within the tail the pancreas, and may represent sequela of prior pancreatitis. 4. Moderate amount of free fluid present within the pelvis 5.  Surgical consultation recommended       Scheduled Medicines   Medications:  sodium chloride flush  10 mL Intravenous 2 times per day    insulin lispro  0-12 Units Subcutaneous 4x Daily WC      Infusions:    sodium chloride 125 mL/hr at 02/18/21 0320    dextrose           IMPRESSION    Patient Active Problem List   Diagnosis    Mesenteric mass         Diabetes mellitus      RECOMMENDATIONS:      1. Reviewed POC blood glucose . Labs and X ray results   2. Reviewed Home and Current Medicines   3. Will Start On Correction bolus Humalog  nsulin regime   4. Monitor Blood glucose frequently   5. Modify  the dose of Insulin  as needed   6. Going for biopsy of the mesenteric mass tomorrow       Will follow with you  Again thank you for sharing pt's care with me.      Truly yours,       Bebeto Rush MD

## 2021-02-20 LAB
ALBUMIN SERPL-MCNC: 3.4 GM/DL (ref 3.4–5)
ALP BLD-CCNC: 81 IU/L (ref 40–128)
ALT SERPL-CCNC: 8 U/L (ref 10–40)
ANION GAP SERPL CALCULATED.3IONS-SCNC: 14 MMOL/L (ref 4–16)
AST SERPL-CCNC: 10 IU/L (ref 15–37)
BASOPHILS ABSOLUTE: 0.1 K/CU MM
BASOPHILS RELATIVE PERCENT: 0.3 % (ref 0–1)
BILIRUB SERPL-MCNC: 0.7 MG/DL (ref 0–1)
BUN BLDV-MCNC: 6 MG/DL (ref 6–23)
CA 19-9: 6 U/ML (ref 0–37)
CALCIUM SERPL-MCNC: 8.4 MG/DL (ref 8.3–10.6)
CHLORIDE BLD-SCNC: 98 MMOL/L (ref 99–110)
CO2: 21 MMOL/L (ref 21–32)
CREAT SERPL-MCNC: 0.9 MG/DL (ref 0.9–1.3)
DIFFERENTIAL TYPE: ABNORMAL
EOSINOPHILS ABSOLUTE: 0.1 K/CU MM
EOSINOPHILS RELATIVE PERCENT: 0.4 % (ref 0–3)
GFR AFRICAN AMERICAN: >60 ML/MIN/1.73M2
GFR NON-AFRICAN AMERICAN: >60 ML/MIN/1.73M2
GLUCOSE BLD-MCNC: 109 MG/DL (ref 70–99)
GLUCOSE BLD-MCNC: 112 MG/DL (ref 70–99)
GLUCOSE BLD-MCNC: 120 MG/DL (ref 70–99)
GLUCOSE BLD-MCNC: 130 MG/DL (ref 70–99)
GLUCOSE BLD-MCNC: 147 MG/DL (ref 70–99)
HCT VFR BLD CALC: 40.8 % (ref 42–52)
HEMOGLOBIN: 13.1 GM/DL (ref 13.5–18)
IMMATURE NEUTROPHIL %: 0.8 % (ref 0–0.43)
LYMPHOCYTES ABSOLUTE: 1.7 K/CU MM
LYMPHOCYTES RELATIVE PERCENT: 9.6 % (ref 24–44)
MCH RBC QN AUTO: 26.8 PG (ref 27–31)
MCHC RBC AUTO-ENTMCNC: 32.1 % (ref 32–36)
MCV RBC AUTO: 83.6 FL (ref 78–100)
MONOCYTES ABSOLUTE: 1.1 K/CU MM
MONOCYTES RELATIVE PERCENT: 6.3 % (ref 0–4)
MS ALPHA-FETOPROTEIN: 1 NG/ML (ref 0–9)
NUCLEATED RBC %: 0 %
PDW BLD-RTO: 12 % (ref 11.7–14.9)
PLATELET # BLD: 280 K/CU MM (ref 140–440)
PMV BLD AUTO: 9.7 FL (ref 7.5–11.1)
POTASSIUM SERPL-SCNC: 4.4 MMOL/L (ref 3.5–5.1)
RBC # BLD: 4.88 M/CU MM (ref 4.6–6.2)
SEGMENTED NEUTROPHILS ABSOLUTE COUNT: 14.8 K/CU MM
SEGMENTED NEUTROPHILS RELATIVE PERCENT: 82.6 % (ref 36–66)
SODIUM BLD-SCNC: 133 MMOL/L (ref 135–145)
TOTAL IMMATURE NEUTOROPHIL: 0.14 K/CU MM
TOTAL NUCLEATED RBC: 0 K/CU MM
TOTAL PROTEIN: 6.5 GM/DL (ref 6.4–8.2)
WBC # BLD: 17.9 K/CU MM (ref 4–10.5)

## 2021-02-20 PROCEDURE — 2580000003 HC RX 258: Performed by: INTERNAL MEDICINE

## 2021-02-20 PROCEDURE — 82962 GLUCOSE BLOOD TEST: CPT

## 2021-02-20 PROCEDURE — 1200000000 HC SEMI PRIVATE

## 2021-02-20 PROCEDURE — 6360000002 HC RX W HCPCS: Performed by: INTERNAL MEDICINE

## 2021-02-20 PROCEDURE — 6370000000 HC RX 637 (ALT 250 FOR IP): Performed by: INTERNAL MEDICINE

## 2021-02-20 PROCEDURE — 85025 COMPLETE CBC W/AUTO DIFF WBC: CPT

## 2021-02-20 PROCEDURE — 36415 COLL VENOUS BLD VENIPUNCTURE: CPT

## 2021-02-20 PROCEDURE — 80053 COMPREHEN METABOLIC PANEL: CPT

## 2021-02-20 PROCEDURE — 94761 N-INVAS EAR/PLS OXIMETRY MLT: CPT

## 2021-02-20 PROCEDURE — 99232 SBSQ HOSP IP/OBS MODERATE 35: CPT | Performed by: SURGERY

## 2021-02-20 RX ADMIN — SODIUM CHLORIDE: 9 INJECTION, SOLUTION INTRAVENOUS at 11:07

## 2021-02-20 RX ADMIN — ONDANSETRON 4 MG: 2 INJECTION INTRAMUSCULAR; INTRAVENOUS at 02:54

## 2021-02-20 RX ADMIN — MORPHINE SULFATE 2 MG: 2 INJECTION, SOLUTION INTRAMUSCULAR; INTRAVENOUS at 14:29

## 2021-02-20 RX ADMIN — SODIUM CHLORIDE: 9 INJECTION, SOLUTION INTRAVENOUS at 01:00

## 2021-02-20 RX ADMIN — ONDANSETRON 4 MG: 2 INJECTION INTRAMUSCULAR; INTRAVENOUS at 14:29

## 2021-02-20 RX ADMIN — MORPHINE SULFATE 2 MG: 2 INJECTION, SOLUTION INTRAMUSCULAR; INTRAVENOUS at 20:39

## 2021-02-20 RX ADMIN — ACETAMINOPHEN 650 MG: 325 TABLET ORAL at 14:30

## 2021-02-20 RX ADMIN — MAGNESIUM HYDROXIDE 30 ML: 400 SUSPENSION ORAL at 14:07

## 2021-02-20 RX ADMIN — ONDANSETRON 4 MG: 2 INJECTION INTRAMUSCULAR; INTRAVENOUS at 20:39

## 2021-02-20 RX ADMIN — SODIUM CHLORIDE: 9 INJECTION, SOLUTION INTRAVENOUS at 20:39

## 2021-02-20 RX ADMIN — PIPERACILLIN AND TAZOBACTAM 3375 MG: 3; .375 INJECTION, POWDER, LYOPHILIZED, FOR SOLUTION INTRAVENOUS at 14:29

## 2021-02-20 RX ADMIN — PIPERACILLIN AND TAZOBACTAM 3375 MG: 3; .375 INJECTION, POWDER, LYOPHILIZED, FOR SOLUTION INTRAVENOUS at 23:20

## 2021-02-20 RX ADMIN — MORPHINE SULFATE 2 MG: 2 INJECTION, SOLUTION INTRAMUSCULAR; INTRAVENOUS at 08:27

## 2021-02-20 RX ADMIN — ONDANSETRON 4 MG: 2 INJECTION INTRAMUSCULAR; INTRAVENOUS at 08:27

## 2021-02-20 RX ADMIN — PIPERACILLIN AND TAZOBACTAM 3375 MG: 3; .375 INJECTION, POWDER, LYOPHILIZED, FOR SOLUTION INTRAVENOUS at 07:08

## 2021-02-20 ASSESSMENT — PAIN SCALES - GENERAL
PAINLEVEL_OUTOF10: 4
PAINLEVEL_OUTOF10: 7
PAINLEVEL_OUTOF10: 0

## 2021-02-20 ASSESSMENT — PAIN DESCRIPTION - ONSET
ONSET: AWAKENED FROM SLEEP
ONSET: ON-GOING

## 2021-02-20 ASSESSMENT — PAIN DESCRIPTION - DESCRIPTORS
DESCRIPTORS: SORE
DESCRIPTORS: ACHING;DISCOMFORT
DESCRIPTORS: SORE

## 2021-02-20 ASSESSMENT — PAIN DESCRIPTION - ORIENTATION
ORIENTATION: LOWER
ORIENTATION: LOWER

## 2021-02-20 ASSESSMENT — PAIN - FUNCTIONAL ASSESSMENT: PAIN_FUNCTIONAL_ASSESSMENT: ACTIVITIES ARE NOT PREVENTED

## 2021-02-20 ASSESSMENT — PAIN DESCRIPTION - LOCATION
LOCATION: ABDOMEN
LOCATION: ABDOMEN

## 2021-02-20 ASSESSMENT — PAIN DESCRIPTION - FREQUENCY: FREQUENCY: INTERMITTENT

## 2021-02-20 ASSESSMENT — PAIN DESCRIPTION - PAIN TYPE
TYPE: ACUTE PAIN
TYPE: ACUTE PAIN

## 2021-02-20 ASSESSMENT — PAIN DESCRIPTION - PROGRESSION: CLINICAL_PROGRESSION: NOT CHANGED

## 2021-02-20 NOTE — PLAN OF CARE
Problem: Pain:  Description: Pain management should include both nonpharmacologic and pharmacologic interventions.   Goal: Pain level will decrease  Description: Pain level will decrease  2/20/2021 1156 by Jarret Andre RN  Outcome: Ongoing  2/19/2021 2325 by Raegan Koenig RN  Outcome: Ongoing  Goal: Control of acute pain  Description: Control of acute pain  2/20/2021 1156 by Jarret Andre RN  Outcome: Ongoing  2/19/2021 2325 by Raegan Koenig RN  Outcome: Ongoing  Goal: Control of chronic pain  Description: Control of chronic pain  2/20/2021 1156 by Jarret Andre RN  Outcome: Ongoing  2/19/2021 2325 by Raegan Koenig RN  Outcome: Ongoing     Problem: Safety:  Goal: Free from accidental physical injury  Description: Free from accidental physical injury  2/20/2021 1156 by Jarret Andre RN  Outcome: Ongoing  2/19/2021 2325 by Raegan Koenig RN  Outcome: Ongoing  Goal: Free from intentional harm  Description: Free from intentional harm  2/20/2021 1156 by Jarret Andre RN  Outcome: Ongoing  2/19/2021 2325 by Raegan Koenig RN  Outcome: Ongoing     Problem: Discharge Planning:  Goal: Patients continuum of care needs are met  Description: Patients continuum of care needs are met  2/20/2021 1156 by Jarret Andre RN  Outcome: Ongoing  2/19/2021 2325 by Raegan Koenig RN  Outcome: Ongoing     Problem: Daily Care:  Goal: Daily care needs are met  Description: Daily care needs are met  2/20/2021 1156 by Jarret Andre RN  Outcome: Ongoing  2/19/2021 2325 by Raegan Koenig RN  Outcome: Ongoing

## 2021-02-20 NOTE — PROGRESS NOTES
Progress Note( Dr. Jose R Drummond)  2/20/2021  Subjective:   Admit Date: 2/17/2021  PCP: No primary care provider on file. Admitted For : Abdominal pain and weight loss    Consulted For: Better control of blood glucose    Interval History: Brain for biopsy of mesenteric mass    Denies any chest pains,   Denies SOB . Denies nausea or vomiting. No new bowel or bladder symptoms. Intake/Output Summary (Last 24 hours) at 2/20/2021 0826  Last data filed at 2/20/2021 0641  Gross per 24 hour   Intake 1475 ml   Output 400 ml   Net 1075 ml       DATA    CBC:   Recent Labs     02/18/21  0632 02/19/21  0534 02/20/21  0730   WBC 21.1* 17.8* 17.9*   HGB 13.4* 13.0* 13.1*    294 280    CMP:  Recent Labs     02/17/21  2255 02/18/21  0632 02/19/21  0534   * 131* 132*   K 3.9 4.3 4.1   CL 92* 97* 98*   CO2 23 26 26   BUN 13 9 6   CREATININE 0.9 0.9 0.9   CALCIUM 9.3 8.7 8.5   PROT 8.1  --  6.1*   LABALBU 3.6  --  3.0*   BILITOT 0.5  --  0.6   ALKPHOS 88  --  73   AST 12*  --  9*   ALT 11  --  8*     Lipids:   Lab Results   Component Value Date    HDL 29 11/07/2020     Glucose:  Recent Labs     02/19/21  0227 02/19/21  1947 02/20/21  0800   POCGLU 102* 107* 112*     [id number]:  Lab Results   Component Value Date    LABA1C 7.3 02/18/2021     High Sensitivity TSH: No results found for: TSHHS  Free T3: No results found for: FT3  Free T4:No results found for: T4FREE    Ct Guided Needle Placement    Result Date: 2/19/2021  PROCEDURE: CT GUIDED NDL PLACEMENT for mesentery mass biopsy. MODERATE CONSCIOUS SEDATION 2/19/2021 HISTORY: ORDERING SYSTEM PROVIDED HISTORY: mesenteric mass, mesenteric    The 17-gauge introducer needle was inserted using imaging guidance. The 18-gauge needle was then inserted in coaxial fashion and a biopsy from the mass was obtained. This was repeated for a total of 7 pieces of core biopsy samples. Core biopsy tissue specimens were submitted for histopathology assessment.  All needles were removed and a sterile bandage applied. SPECIMENS SENT: For pathology evaluation. COMPLICATIONS: Post-procedure images demonstrate no evidence of immediate massive bleeding. The patient tolerated the procedure well with no immediate complications. Kulwant Vanessa FINDINGS: Successful CT guided mesentery mass core needle biopsy as described above. Successful CT guided mesentery mass biopsy. Ct Abdomen Pelvis W Iv Contrast Additional Contrast? None    Result Date: 2/18/2021  EXAMINATION: CT OF THE ABDOMEN AND PELVIS WITH CONTRAST 2/17/2021 7:10 pm   1. Abnormal wall thickening involving the gastric antrum, duodenal C-loop, and proximal jejunum, which may be inflammatory or neoplastic in etiology. 2. Approximate 4.3 x 3.7 cm heterogeneous \"mass\", within the mesentery, with surrounding inflammation. Differential considerations would include a necrotic neoplasm, necrotic adenopathy related to lymphoma or metastatic disease, or an infectious process. An additional 2.2 x 2.1 cm mass with central low attenuation is present more caudally within the mesentery. No air bubbles are present within these collections to suggest abscesses. Multiple additional mesenteric lymph nodes are present. 3. 1.8 cm cystic lesion within the tail the pancreas, and may represent sequela of prior pancreatitis. 4. Moderate amount of free fluid present within the pelvis 5.  Surgical consultation recommended       Scheduled Medicines   Medications:    piperacillin-tazobactam  3,375 mg Intravenous Q8H    sodium chloride flush  10 mL Intravenous 2 times per day    insulin lispro  0-12 Units Subcutaneous 4x Daily WC      Infusions:    sodium chloride 125 mL/hr at 02/20/21 0100    dextrose           Objective:   Vitals: /84   Pulse 123   Temp 98.6 °F (37 °C) (Oral)   Resp 16   Ht 6' 4\" (1.93 m)   Wt 200 lb (90.7 kg)   SpO2 94%   BMI 24.34 kg/m²   General appearance: alert and cooperative with exam  Neck: no JVD or bruit  Thyroid : Normal lobes   Lungs: Has Vesicular Breath sounds   Heart:  regular rate and rhythm  Abdomen: soft, non-tender; bowel sounds normal; no masses,  no organomegaly  Musculoskeletal: Normal  Extremities: extremities normal, , no edema  Neurologic:  Awake, alert, oriented to name, place and time. Cranial nerves II-XII are grossly intact. Motor is  intact. Sensory is intact. ,  and gait is normal.    Assessment:     Patient Active Problem List:     Mesenteric mass     Diabetes mellitus     Weight loss      Plan:     1. Reviewed POC blood glucose . Labs and X ray results   2. Reviewed Current Medicines   3. On Correction bolus Humalog/ Basal Lantus Insulin regime  4. Monitor Blood glucose frequently  5. Modified  the dose of Insulin/ other medicines as needed   6. Will follow     .      Mortimer Dupont, MD

## 2021-02-20 NOTE — PROGRESS NOTES
GENERAL SURGERY PROGRESS NOTE    CC/HPI:           Patient feels the same diffuse abdominal pain, yesterday IR percutaneously biopsied the abdominal mesenteric masses. Vitals:    02/19/21 2045 02/20/21 0305 02/20/21 0830 02/20/21 0933   BP: 124/78 135/84 119/70    Pulse: 108 123 105    Resp: 16 16 16    Temp: 97.3 °F (36.3 °C) 98.6 °F (37 °C) 98.3 °F (36.8 °C)    TempSrc: Oral Oral Oral    SpO2: 95% 94% 97% 97%   Weight:       Height:         I/O last 3 completed shifts: In: 4646 [I.V.:1375; IV Piggyback:100]  Out: 400 [Urine:400]  No intake/output data recorded. DIET LOW FIBER;   Dietary Nutrition Supplements: Standard High Calorie Oral Supplement    Recent Results (from the past 48 hour(s))   POCT Glucose    Collection Time: 02/18/21  8:27 PM   Result Value Ref Range    POC Glucose 144 (H) 70 - 99 MG/DL   POCT Glucose    Collection Time: 02/19/21  2:27 AM   Result Value Ref Range    POC Glucose 102 (H) 70 - 99 MG/DL   CBC auto differential    Collection Time: 02/19/21  5:34 AM   Result Value Ref Range    WBC 17.8 (H) 4.0 - 10.5 K/CU MM    RBC 4.87 4.6 - 6.2 M/CU MM    Hemoglobin 13.0 (L) 13.5 - 18.0 GM/DL    Hematocrit 40.4 (L) 42 - 52 %    MCV 83.0 78 - 100 FL    MCH 26.7 (L) 27 - 31 PG    MCHC 32.2 32.0 - 36.0 %    RDW 12.0 11.7 - 14.9 %    Platelets 892 878 - 249 K/CU MM    MPV 9.9 7.5 - 11.1 FL    Differential Type AUTOMATED DIFFERENTIAL     Segs Relative 78.8 (H) 36 - 66 %    Lymphocytes % 14.4 (L) 24 - 44 %    Monocytes % 5.5 (H) 0 - 4 %    Eosinophils % 0.4 0 - 3 %    Basophils % 0.3 0 - 1 %    Segs Absolute 14.0 K/CU MM    Lymphocytes Absolute 2.6 K/CU MM    Monocytes Absolute 1.0 K/CU MM    Eosinophils Absolute 0.1 K/CU MM    Basophils Absolute 0.1 K/CU MM    Nucleated RBC % 0.0 %    Total Nucleated RBC 0.0 K/CU MM    Total Immature Neutrophil 0.11 K/CU MM    Immature Neutrophil % 0.6 (H) 0 - 0.43 %   Comprehensive Metabolic Panel w/ Reflex to MG    Collection Time: 02/19/21  5:34 AM Result Value Ref Range    Sodium 132 (L) 135 - 145 MMOL/L    Potassium 4.1 3.5 - 5.1 MMOL/L    Chloride 98 (L) 99 - 110 mMol/L    CO2 26 21 - 32 MMOL/L    BUN 6 6 - 23 MG/DL    CREATININE 0.9 0.9 - 1.3 MG/DL    Glucose 97 70 - 99 MG/DL    Calcium 8.5 8.3 - 10.6 MG/DL    Albumin 3.0 (L) 3.4 - 5.0 GM/DL    Total Protein 6.1 (L) 6.4 - 8.2 GM/DL    Total Bilirubin 0.6 0.0 - 1.0 MG/DL    ALT 8 (L) 10 - 40 U/L    AST 9 (L) 15 - 37 IU/L    Alkaline Phosphatase 73 40 - 129 IU/L    GFR Non-African American >60 >60 mL/min/1.73m2    GFR African American >60 >60 mL/min/1.73m2    Anion Gap 8 4 - 16   CEA    Collection Time: 02/19/21  5:34 AM   Result Value Ref Range    CEA 1.1 NG/ML   C-Reactive Protein    Collection Time: 02/19/21  5:34 AM   Result Value Ref Range    CRP, High Sensitivity >300.0 mg/L   Procalcitonin    Collection Time: 02/19/21  5:34 AM   Result Value Ref Range    Procalcitonin 4.95    EKG 12 Lead    Collection Time: 02/19/21  5:08 PM   Result Value Ref Range    Ventricular Rate 130 BPM    Atrial Rate 130 BPM    P-R Interval 152 ms    QRS Duration 80 ms    Q-T Interval 278 ms    QTc Calculation (Bazett) 409 ms    P Axis 36 degrees    R Axis 17 degrees    T Axis 8 degrees    Diagnosis       Sinus tachycardia  Nonspecific T wave abnormality  Abnormal ECG  When compared with ECG of 05-NOV-2020 16:13,  ST no longer elevated in Inferior leads  Nonspecific T wave abnormality now evident in Inferior leads  Nonspecific T wave abnormality, worse in Lateral leads     POCT Glucose    Collection Time: 02/19/21  7:47 PM   Result Value Ref Range    POC Glucose 107 (H) 70 - 99 MG/DL   CBC auto differential    Collection Time: 02/20/21  7:30 AM   Result Value Ref Range    WBC 17.9 (H) 4.0 - 10.5 K/CU MM    RBC 4.88 4.6 - 6.2 M/CU MM    Hemoglobin 13.1 (L) 13.5 - 18.0 GM/DL    Hematocrit 40.8 (L) 42 - 52 %    MCV 83.6 78 - 100 FL    MCH 26.8 (L) 27 - 31 PG    MCHC 32.1 32.0 - 36.0 %    RDW 12.0 11.7 - 14.9 %    Platelets 475 140 - 440 K/CU MM    MPV 9.7 7.5 - 11.1 FL    Differential Type AUTOMATED DIFFERENTIAL     Segs Relative 82.6 (H) 36 - 66 %    Lymphocytes % 9.6 (L) 24 - 44 %    Monocytes % 6.3 (H) 0 - 4 %    Eosinophils % 0.4 0 - 3 %    Basophils % 0.3 0 - 1 %    Segs Absolute 14.8 K/CU MM    Lymphocytes Absolute 1.7 K/CU MM    Monocytes Absolute 1.1 K/CU MM    Eosinophils Absolute 0.1 K/CU MM    Basophils Absolute 0.1 K/CU MM    Nucleated RBC % 0.0 %    Total Nucleated RBC 0.0 K/CU MM    Total Immature Neutrophil 0.14 K/CU MM    Immature Neutrophil % 0.8 (H) 0 - 0.43 %   Comprehensive Metabolic Panel w/ Reflex to MG    Collection Time: 02/20/21  7:30 AM   Result Value Ref Range    Sodium 133 (L) 135 - 145 MMOL/L    Potassium 4.4 3.5 - 5.1 MMOL/L    Chloride 98 (L) 99 - 110 mMol/L    CO2 21 21 - 32 MMOL/L    BUN 6 6 - 23 MG/DL    CREATININE 0.9 0.9 - 1.3 MG/DL    Glucose 120 (H) 70 - 99 MG/DL    Calcium 8.4 8.3 - 10.6 MG/DL    Albumin 3.4 3.4 - 5.0 GM/DL    Total Protein 6.5 6.4 - 8.2 GM/DL    Total Bilirubin 0.7 0.0 - 1.0 MG/DL    ALT 8 (L) 10 - 40 U/L    AST 10 (L) 15 - 37 IU/L    Alkaline Phosphatase 81 40 - 128 IU/L    GFR Non-African American >60 >60 mL/min/1.73m2    GFR African American >60 >60 mL/min/1.73m2    Anion Gap 14 4 - 16   POCT Glucose    Collection Time: 02/20/21  8:00 AM   Result Value Ref Range    POC Glucose 112 (H) 70 - 99 MG/DL   POCT Glucose    Collection Time: 02/20/21 12:25 PM   Result Value Ref Range    POC Glucose 130 (H) 70 - 99 MG/DL       Scheduled Meds:   magnesium hydroxide  30 mL Oral Daily    piperacillin-tazobactam  3,375 mg Intravenous Q8H    sodium chloride flush  10 mL Intravenous 2 times per day    insulin lispro  0-12 Units Subcutaneous 4x Daily WC       Continuous Infusions:   sodium chloride 125 mL/hr at 02/20/21 1107    dextrose         Physical Exam:  HEENT: Anicteric sclerae, Oropharyngeal mucosae moist, pink and intact.   Heart:  Normal S1 and S2, RRR  Lungs: Clear to auscultation bilaterally, No audible Wheezes or Rales. Extremities: No edema. Neuro: Alert and Oriented x 3, Non focal.  Abdomen: Soft, Benign, Diffusely tender, Non distended, Positive bowel sounds. Active Problems:    Mesenteric mass  Resolved Problems:    * No resolved hospital problems. *      Assessment and Plan:  Aleena Dalton is a 23 y.o. male with Mesenteric masses, loss of weight, 30 Lbs over the past 4 months. . major symptoms. [facility] IR percutaneously biopsied the abdominal mesenteric masses. Will check tumor markers - ordered, & so far: CEA 1.1 NOT elevated.     Awaiting IR biopsies PATHOLOGY, to determine management.    ___________________________________________    Armani Hernandez MD, FACS, FICS  2/20/2021  1:32 PM

## 2021-02-20 NOTE — PROGRESS NOTES
Hospitalist Progress Note      Name:  Shama Martinez /Age/Sex: 1995  (23 y.o. male)   MRN & CSN:  [id number] & [id number] Admission Date/Time: 2021 10:04 PM   Location:  [address] [place] [address] PCP: No primary care provider on file. Hospital Day: 4    Assessment and Plan:   Shama Martinez is a 23 y.o.  male  who presents with  Abdominal pain. · Mesenteric mass, concern for malignancy  · Constipation  CT abdomen/pelvis concerning for mesenteric masses   General surgery on board, tumor markers sent  IR on board for biopsy 2021, biopsy pending  IV fluids, analgesics , antiemetics   Laxatives    · Unintentional weight loss, possible malignancy  · Moderate protein energy malnutrition  Supplements ordered  Being worked up      · Sepsis from possible intra-abdominal infection   Meet SIRS criteria, tachycardic, T-max 37.9  Leukocytosis mild trend down  Procalcitonin and CRP elevated  Check blood cultures, pending  UA negative  Received IV Vanco, Zosyn in ED  Resume IV Zosyn empirically    · Pseudohyponatremia, likely due to hypoglycemia, corrected within normal limits, monitor  IV fluids    · Diabetes mellitus type 2, poor control  Patient lost significant weight  Not on any medications prior to admission, due to hypoglycemia  from previous medication  A1c-7.3  Sliding scale, endocrine on board      Diet DIET CLEAR LIQUID;   DVT Prophylaxis [] Lovenox, []  Heparin, [] SCDs, []No VTE prophylaxis, patient ambulating   GI Prophylaxis [] PPI, [] H2 Blocker, [] No GI prophylaxis, patient is receiving diet/Tube Feeds   Code Status Full Code   Disposition Patient requires continued admission due to    MDM [] Low, [] Moderate,[]  High  Patient's risk as above due to      History of Present Illness:     Pt S&E.      Patient notes feeling better overall in terms of pain, managed to move bowel today, denies any shortness of breath, cough, or urinary symptoms, slightly nauseous but improved, abdominal pain also improved  10-14 point ROS reviewed negative, unless as noted above    Objective: Intake/Output Summary (Last 24 hours) at 2/20/2021 1029  Last data filed at 2/20/2021 0641  Gross per 24 hour   Intake 1475 ml   Output 400 ml   Net 1075 ml      Vitals:   Vitals:    02/20/21 0933   BP:    Pulse:    Resp:    Temp:    SpO2: 97%     Physical Exam:    GEN Awake male, sitting upright in bed in no apparent distress. Appears given age. EYES Pupils are equally round. No scleral erythema, discharge, or conjunctivitis. HENT Mucous membranes are moist.   NECK No apparent thyromegaly or masses. RESP Clear to auscultation, no wheezes, rales or rhonchi. Symmetric chest movement while on room air. CARDIO/VASC S1/S2 auscultated. Regular rate without appreciable murmurs, rubs, or gallops. Peripheral pulses equal bilaterally and palpable. No peripheral edema. GI right lower quadrant pain, no masses palpable or guarding. Bowel sounds are normoactive. Rectal exam deferred.  Su catheter is not present. HEME/LYMPH No petechiae or ecchymoses. MSK No gross joint deformities. Spontaneous movement of all extremities  SKIN Normal coloration, warm, dry. NEURO Cranial nerves appear grossly intact, normal speech, no lateralizing weakness. PSYCH Awake, alert, oriented x 4. Affect appropriate.     Medications:   Medications:    piperacillin-tazobactam  3,375 mg Intravenous Q8H    sodium chloride flush  10 mL Intravenous 2 times per day    insulin lispro  0-12 Units Subcutaneous 4x Daily WC      Infusions:    sodium chloride 125 mL/hr at 02/20/21 0100    dextrose       PRN Meds:     magnesium hydroxide, 30 mL, Daily PRN      sodium chloride flush, 10 mL, PRN      promethazine, 12.5 mg, Q6H PRN    Or      ondansetron, 4 mg, Q6H PRN      acetaminophen, 650 mg, Q6H PRN    Or      acetaminophen, 650 mg, Q6H PRN      glucose, 15 g, PRN      dextrose, 12.5 g, PRN      glucagon (rDNA), 1 mg, PRN      dextrose, 100 mL/hr, PRN      polyethylene glycol, 17 g, Daily PRN      morphine, 2 mg, Q4H PRN        Data    Recent Labs     02/18/21  0632 02/19/21  0534 02/20/21  0730   WBC 21.1* 17.8* 17.9*   HGB 13.4* 13.0* 13.1*   HCT 40.6* 40.4* 40.8*    294 280      Recent Labs     02/18/21  0632 02/19/21  0534 02/20/21  0730   * 132* 133*   K 4.3 4.1 4.4   CL 97* 98* 98*   CO2 26 26 21   BUN 9 6 6   CREATININE 0.9 0.9 0.9     Recent Labs     02/17/21  2255 02/19/21  0534 02/20/21  0730   AST 12* 9* 10*   ALT 11 8* 8*   BILITOT 0.5 0.6 0.7   ALKPHOS 88 73 81     Recent Labs     02/18/21  0632   INR 1.22     No results for input(s): CKTOTAL, CKMB, CKMBINDEX, TROPONINI in the last 72 hours.         Electronically signed by Sandrita Giraldo MD on 2/20/2021 at 10:29 AM

## 2021-02-21 LAB
ALBUMIN SERPL-MCNC: 3 GM/DL (ref 3.4–5)
ALP BLD-CCNC: 71 IU/L (ref 40–128)
ALT SERPL-CCNC: 10 U/L (ref 10–40)
ANION GAP SERPL CALCULATED.3IONS-SCNC: 9 MMOL/L (ref 4–16)
AST SERPL-CCNC: 13 IU/L (ref 15–37)
BASOPHILS ABSOLUTE: 0 K/CU MM
BASOPHILS RELATIVE PERCENT: 0.4 % (ref 0–1)
BILIRUB SERPL-MCNC: 0.4 MG/DL (ref 0–1)
BUN BLDV-MCNC: 4 MG/DL (ref 6–23)
CALCIUM SERPL-MCNC: 8.1 MG/DL (ref 8.3–10.6)
CHLORIDE BLD-SCNC: 100 MMOL/L (ref 99–110)
CO2: 26 MMOL/L (ref 21–32)
CREAT SERPL-MCNC: 0.8 MG/DL (ref 0.9–1.3)
DIFFERENTIAL TYPE: ABNORMAL
EOSINOPHILS ABSOLUTE: 0.3 K/CU MM
EOSINOPHILS RELATIVE PERCENT: 2.6 % (ref 0–3)
GFR AFRICAN AMERICAN: >60 ML/MIN/1.73M2
GFR NON-AFRICAN AMERICAN: >60 ML/MIN/1.73M2
GLUCOSE BLD-MCNC: 110 MG/DL (ref 70–99)
GLUCOSE BLD-MCNC: 124 MG/DL (ref 70–99)
GLUCOSE BLD-MCNC: 130 MG/DL (ref 70–99)
GLUCOSE BLD-MCNC: 135 MG/DL (ref 70–99)
GLUCOSE BLD-MCNC: 157 MG/DL (ref 70–99)
HCT VFR BLD CALC: 36.5 % (ref 42–52)
HEMOGLOBIN: 11.6 GM/DL (ref 13.5–18)
HIGH SENSITIVE C-REACTIVE PROTEIN: >300 MG/L
IMMATURE NEUTROPHIL %: 0.5 % (ref 0–0.43)
LYMPHOCYTES ABSOLUTE: 1.7 K/CU MM
LYMPHOCYTES RELATIVE PERCENT: 16.3 % (ref 24–44)
MCH RBC QN AUTO: 26.4 PG (ref 27–31)
MCHC RBC AUTO-ENTMCNC: 31.8 % (ref 32–36)
MCV RBC AUTO: 83.1 FL (ref 78–100)
MONOCYTES ABSOLUTE: 0.7 K/CU MM
MONOCYTES RELATIVE PERCENT: 6.8 % (ref 0–4)
NUCLEATED RBC %: 0 %
PDW BLD-RTO: 12.2 % (ref 11.7–14.9)
PLATELET # BLD: 287 K/CU MM (ref 140–440)
PMV BLD AUTO: 10.1 FL (ref 7.5–11.1)
POTASSIUM SERPL-SCNC: 4.1 MMOL/L (ref 3.5–5.1)
PROCALCITONIN: 1.09
RBC # BLD: 4.39 M/CU MM (ref 4.6–6.2)
SEGMENTED NEUTROPHILS ABSOLUTE COUNT: 7.6 K/CU MM
SEGMENTED NEUTROPHILS RELATIVE PERCENT: 73.4 % (ref 36–66)
SODIUM BLD-SCNC: 135 MMOL/L (ref 135–145)
TOTAL IMMATURE NEUTOROPHIL: 0.05 K/CU MM
TOTAL NUCLEATED RBC: 0 K/CU MM
TOTAL PROTEIN: 6.1 GM/DL (ref 6.4–8.2)
WBC # BLD: 10.3 K/CU MM (ref 4–10.5)

## 2021-02-21 PROCEDURE — 2580000003 HC RX 258: Performed by: INTERNAL MEDICINE

## 2021-02-21 PROCEDURE — 6360000002 HC RX W HCPCS: Performed by: INTERNAL MEDICINE

## 2021-02-21 PROCEDURE — 84145 PROCALCITONIN (PCT): CPT

## 2021-02-21 PROCEDURE — 99232 SBSQ HOSP IP/OBS MODERATE 35: CPT | Performed by: SURGERY

## 2021-02-21 PROCEDURE — 85025 COMPLETE CBC W/AUTO DIFF WBC: CPT

## 2021-02-21 PROCEDURE — 80053 COMPREHEN METABOLIC PANEL: CPT

## 2021-02-21 PROCEDURE — 94761 N-INVAS EAR/PLS OXIMETRY MLT: CPT

## 2021-02-21 PROCEDURE — 6370000000 HC RX 637 (ALT 250 FOR IP): Performed by: INTERNAL MEDICINE

## 2021-02-21 PROCEDURE — 82962 GLUCOSE BLOOD TEST: CPT

## 2021-02-21 PROCEDURE — 36415 COLL VENOUS BLD VENIPUNCTURE: CPT

## 2021-02-21 PROCEDURE — 93010 ELECTROCARDIOGRAM REPORT: CPT | Performed by: INTERNAL MEDICINE

## 2021-02-21 PROCEDURE — 1200000000 HC SEMI PRIVATE

## 2021-02-21 PROCEDURE — 86141 C-REACTIVE PROTEIN HS: CPT

## 2021-02-21 RX ORDER — POLYETHYLENE GLYCOL 3350 17 G/17G
17 POWDER, FOR SOLUTION ORAL DAILY
Status: DISCONTINUED | OUTPATIENT
Start: 2021-02-21 | End: 2021-02-25 | Stop reason: HOSPADM

## 2021-02-21 RX ADMIN — MAGNESIUM HYDROXIDE 30 ML: 400 SUSPENSION ORAL at 10:53

## 2021-02-21 RX ADMIN — PIPERACILLIN AND TAZOBACTAM 3375 MG: 3; .375 INJECTION, POWDER, LYOPHILIZED, FOR SOLUTION INTRAVENOUS at 16:37

## 2021-02-21 RX ADMIN — SODIUM CHLORIDE, PRESERVATIVE FREE 10 ML: 5 INJECTION INTRAVENOUS at 10:52

## 2021-02-21 RX ADMIN — PIPERACILLIN AND TAZOBACTAM 3375 MG: 3; .375 INJECTION, POWDER, LYOPHILIZED, FOR SOLUTION INTRAVENOUS at 23:31

## 2021-02-21 RX ADMIN — ACETAMINOPHEN 650 MG: 325 TABLET ORAL at 10:53

## 2021-02-21 RX ADMIN — PIPERACILLIN AND TAZOBACTAM 3375 MG: 3; .375 INJECTION, POWDER, LYOPHILIZED, FOR SOLUTION INTRAVENOUS at 06:33

## 2021-02-21 RX ADMIN — INSULIN LISPRO 2 UNITS: 100 INJECTION, SOLUTION INTRAVENOUS; SUBCUTANEOUS at 17:41

## 2021-02-21 RX ADMIN — ONDANSETRON 4 MG: 2 INJECTION INTRAMUSCULAR; INTRAVENOUS at 03:33

## 2021-02-21 ASSESSMENT — PAIN SCALES - GENERAL
PAINLEVEL_OUTOF10: 3
PAINLEVEL_OUTOF10: 0

## 2021-02-21 NOTE — PLAN OF CARE
Problem: Pain:  Description: Pain management should include both nonpharmacologic and pharmacologic interventions. Goal: Pain level will decrease  Description: Pain level will decrease  2/20/2021 2306 by Judith Arellano RN  Outcome: Ongoing     Problem: Pain:  Description: Pain management should include both nonpharmacologic and pharmacologic interventions. Goal: Control of acute pain  Description: Control of acute pain  2/20/2021 2306 by Judith Arellano RN  Outcome: Ongoing     Problem: Pain:  Description: Pain management should include both nonpharmacologic and pharmacologic interventions.   Goal: Control of chronic pain  Description: Control of chronic pain  2/20/2021 2306 by Judith Arellano RN  Outcome: Ongoing     Problem: Safety:  Goal: Free from accidental physical injury  Description: Free from accidental physical injury  2/20/2021 2306 by Judith Arellano RN  Outcome: Ongoing     Problem: Safety:  Goal: Free from intentional harm  Description: Free from intentional harm  2/20/2021 2306 by Judith Arellano RN  Outcome: Ongoing     Problem: Daily Care:  Goal: Daily care needs are met  Description: Daily care needs are met  2/20/2021 2306 by Judith Arellano RN  Outcome: Ongoing     Problem: Discharge Planning:  Goal: Patients continuum of care needs are met  Description: Patients continuum of care needs are met  2/20/2021 2306 by Judith Arellano RN  Outcome: Ongoing

## 2021-02-21 NOTE — PROGRESS NOTES
Progress Note( Dr. Francoise Galicia)  2/21/2021  Subjective:   Admit Date: 2/17/2021  PCP: No primary care provider on file. Admitted For : Abdominal pain and weight loss    Consulted For: Better control of blood glucose    Interval History: Brain for biopsy of mesenteric mass    Denies any chest pains,   Denies SOB . Denies nausea or vomiting. No new bowel or bladder symptoms. Intake/Output Summary (Last 24 hours) at 2/21/2021 0821  Last data filed at 2/20/2021 2039  Gross per 24 hour   Intake --   Output 800 ml   Net -800 ml       DATA    CBC:   Recent Labs     02/19/21  0534 02/20/21  0730 02/21/21  0703   WBC 17.8* 17.9* 10.3   HGB 13.0* 13.1* 11.6*    280 287    CMP:  Recent Labs     02/19/21  0534 02/20/21  0730   * 133*   K 4.1 4.4   CL 98* 98*   CO2 26 21   BUN 6 6   CREATININE 0.9 0.9   CALCIUM 8.5 8.4   PROT 6.1* 6.5   LABALBU 3.0* 3.4   BILITOT 0.6 0.7   ALKPHOS 73 81   AST 9* 10*   ALT 8* 8*     Lipids:   Lab Results   Component Value Date    HDL 29 11/07/2020     Glucose:  Recent Labs     02/20/21  1736 02/20/21  1959 02/21/21  0759   POCGLU 109* 147* 135*     [id number]:  Lab Results   Component Value Date    LABA1C 7.3 02/18/2021     High Sensitivity TSH: No results found for: TSHHS  Free T3: No results found for: FT3  Free T4:No results found for: T4FREE    Ct Guided Needle Placement    Result Date: 2/19/2021  PROCEDURE: CT GUIDED NDL PLACEMENT for mesentery mass biopsy. MODERATE CONSCIOUS SEDATION 2/19/2021 HISTORY: ORDERING SYSTEM PROVIDED HISTORY: mesenteric mass, mesenteric    The 17-gauge introducer needle was inserted using imaging guidance. The 18-gauge needle was then inserted in coaxial fashion and a biopsy from the mass was obtained. This was repeated for a total of 7 pieces of core biopsy samples. Core biopsy tissue specimens were submitted for histopathology assessment. All needles were removed and a sterile bandage applied. SPECIMENS SENT: For pathology evaluation. COMPLICATIONS: Post-procedure images demonstrate no evidence of immediate massive bleeding. The patient tolerated the procedure well with no immediate complications. Gertrudis Calloway FINDINGS: Successful CT guided mesentery mass core needle biopsy as described above. Successful CT guided mesentery mass biopsy. Ct Abdomen Pelvis W Iv Contrast Additional Contrast? None    Result Date: 2/18/2021  EXAMINATION: CT OF THE ABDOMEN AND PELVIS WITH CONTRAST 2/17/2021 7:10 pm   1. Abnormal wall thickening involving the gastric antrum, duodenal C-loop, and proximal jejunum, which may be inflammatory or neoplastic in etiology. 2. Approximate 4.3 x 3.7 cm heterogeneous \"mass\", within the mesentery, with surrounding inflammation. Differential considerations would include a necrotic neoplasm, necrotic adenopathy related to lymphoma or metastatic disease, or an infectious process. An additional 2.2 x 2.1 cm mass with central low attenuation is present more caudally within the mesentery. No air bubbles are present within these collections to suggest abscesses. Multiple additional mesenteric lymph nodes are present. 3. 1.8 cm cystic lesion within the tail the pancreas, and may represent sequela of prior pancreatitis. 4. Moderate amount of free fluid present within the pelvis 5.  Surgical consultation recommended       Scheduled Medicines   Medications:    magnesium hydroxide  30 mL Oral Daily    piperacillin-tazobactam  3,375 mg Intravenous Q8H    sodium chloride flush  10 mL Intravenous 2 times per day    insulin lispro  0-12 Units Subcutaneous 4x Daily WC      Infusions:    sodium chloride 125 mL/hr at 02/20/21 2039    dextrose           Objective:   Vitals: /78   Pulse 85   Temp 98 °F (36.7 °C) (Oral)   Resp 16   Ht 6' 4\" (1.93 m)   Wt 200 lb (90.7 kg)   SpO2 97%   BMI 24.34 kg/m²   General appearance: alert and cooperative with exam  Neck: no JVD or bruit  Thyroid : Normal lobes   Lungs: Has Vesicular Breath sounds Heart:  regular rate and rhythm  Abdomen: soft, non-tender; bowel sounds normal; no masses,  no organomegaly  Musculoskeletal: Normal  Extremities: extremities normal, , no edema  Neurologic:  Awake, alert, oriented to name, place and time. Cranial nerves II-XII are grossly intact. Motor is  intact. Sensory is intact. ,  and gait is normal.    Assessment:     Patient Active Problem List:     Mesenteric mass     Diabetes mellitus     Weight loss      Plan:     1. Reviewed POC blood glucose . Labs and X ray results   2. Reviewed Current Medicines   3. On Correction bolus Humalog/ Basal Lantus Insulin regime  4. Monitor Blood glucose frequently  5. Modified  the dose of Insulin/ other medicines as needed   6. And add advanced almost normal diet  7. Will follow     .      Amira Sandoval MD

## 2021-02-21 NOTE — PROGRESS NOTES
Hospitalist Progress Note      Name:  Shama Martinez /Age/Sex: 1995  (23 y.o. male)   MRN & CSN:  [id number] & [id number] Admission Date/Time: 2021 10:04 PM   Location:  [address] [place] [address] PCP: No primary care provider on file. Hospital Day: 5    Assessment and Plan:   Shama Martinez is a 23 y.o.  male  who presents with  Abdominal pain. · Mesenteric mass, concern for malignancy  · Constipation  CT abdomen/pelvis concerning for mesenteric masses   General surgery on board, tumor markers, CEA, AFP, ca19-9 negative  IR on board for biopsy 2021, biopsy pending  IV fluids, analgesics , antiemetics   Laxatives    · Unintentional weight loss, possible malignancy  · Moderate protein energy malnutrition  Supplements ordered  Being worked up  [employer] on board    · Sepsis from possible intra-abdominal infection   Meet SIRS criteria, tachycardic, T-max 37.3  Leukocytosis trend down  Procalcitonin and CRP elevated, trend  Check blood cultures, negative  UA negative  Received IV Vanco, Zosyn in ED  Resume IV Zosyn empirically    · Pseudohyponatremia, likely due to hypoglycemia, corrected within normal limits, monitor- improved  IV fluids    · Diabetes mellitus type 2, poor control  Patient lost significant weight  Not on any medications prior to admission, due to hypoglycemia  from previous medication  A1c-7.3  Sliding scale, endocrine on board      Diet DIET GENERAL;  Dietary Nutrition Supplements: Diabetic Oral Supplement  Dietary Nutrition Supplements: Frozen Oral Supplement   DVT Prophylaxis [] Lovenox, []  Heparin, [] SCDs, []No VTE prophylaxis, patient ambulating   GI Prophylaxis [] PPI, [] H2 Blocker, [] No GI prophylaxis, patient is receiving diet/Tube Feeds   Code Status Full Code   Disposition Patient requires continued admission due to    MDM [] Low, [] Moderate,[]  High  Patient's risk as above due to      History of Present Illness:     Pt S&E.      Feels better, moved bowels once , tolerating diet  10-14 point ROS reviewed negative, unless as noted above    Objective: Intake/Output Summary (Last 24 hours) at 2/21/2021 1224  Last data filed at 2/20/2021 2039  Gross per 24 hour   Intake --   Output 800 ml   Net -800 ml      Vitals:   Vitals:    02/21/21 0801   BP: 131/78   Pulse: 85   Resp: 16   Temp: 98 °F (36.7 °C)   SpO2: 97%     Physical Exam:    GEN Awake male, sitting upright in bed in no apparent distress. Appears given age. EYES Pupils are equally round. No scleral erythema, discharge, or conjunctivitis. HENT Mucous membranes are moist.   NECK No apparent thyromegaly or masses. RESP Clear to auscultation, no wheezes, rales or rhonchi. Symmetric chest movement while on room air. CARDIO/VASC S1/S2 auscultated. Regular rate without appreciable murmurs, rubs, or gallops. Peripheral pulses equal bilaterally and palpable. No peripheral edema. GI right lower quadrant pain, no masses palpable or guarding. Bowel sounds are normoactive. Rectal exam deferred.  Su catheter is not present. HEME/LYMPH No petechiae or ecchymoses. MSK No gross joint deformities. Spontaneous movement of all extremities  SKIN Normal coloration, warm, dry. NEURO Cranial nerves appear grossly intact, normal speech, no lateralizing weakness. PSYCH Awake, alert, oriented x 4. Affect appropriate.     Medications:   Medications:    polyethylene glycol  17 g Oral Daily    magnesium hydroxide  30 mL Oral Daily    piperacillin-tazobactam  3,375 mg Intravenous Q8H    sodium chloride flush  10 mL Intravenous 2 times per day    insulin lispro  0-12 Units Subcutaneous 4x Daily WC      Infusions:    sodium chloride 125 mL/hr at 02/20/21 2039    dextrose       PRN Meds:     sodium chloride flush, 10 mL, PRN      promethazine, 12.5 mg, Q6H PRN    Or      ondansetron, 4 mg, Q6H PRN      acetaminophen, 650 mg, Q6H PRN    Or      acetaminophen, 650 mg, Q6H PRN      glucose, 15 g, PRN      dextrose, 12.5 g, PRN      glucagon (rDNA), 1 mg, PRN      dextrose, 100 mL/hr, PRN        Data    Recent Labs     02/19/21  0534 02/20/21  0730 02/21/21  0703   WBC 17.8* 17.9* 10.3   HGB 13.0* 13.1* 11.6*   HCT 40.4* 40.8* 36.5*    280 287      Recent Labs     02/19/21  0534 02/20/21  0730 02/21/21  0703   * 133* 135   K 4.1 4.4 4.1   CL 98* 98* 100   CO2 26 21 26   BUN 6 6 4*   CREATININE 0.9 0.9 0.8*     Recent Labs     02/19/21  0534 02/20/21  0730 02/21/21  0703   AST 9* 10* 13*   ALT 8* 8* 10   BILITOT 0.6 0.7 0.4   ALKPHOS 73 81 71     No results for input(s): INR in the last 72 hours. No results for input(s): CKTOTAL, CKMB, CKMBINDEX, TROPONINI in the last 72 hours.         Electronically signed by Jeevan Espinosa MD on 2/21/2021 at 12:24 PM

## 2021-02-21 NOTE — PROGRESS NOTES
Comprehensive Nutrition Assessment    Type and Reason for Visit:  Initial, Positive Nutrition Screen(wt loss, poor intake)    Nutrition Recommendations/Plan:   · Continue General Diet  · Should Carb Control Diet be needed, Carb Control 5 is appropriate for pt's energy needs  · Will modify standard high bill oral nutrition supplement tid to better control blood glucose and provide some variety for pt  · Nutrition focused physical exam for malnutrition at reassess    Nutrition Assessment:  Pt admitted with mesenteric mass s/p biopsy, possible malignancy and reported unintentional wt loss of ~30 lb x 4 months. Wt history and current wt are both stated v actual. H/O uncontrolled DM. No intake recorded yet on General Diet with standard supplements tid. Will continue to follow as high nutrition risk. Malnutrition Assessment:  Malnutrition Status:  Insufficient data    Context:  Acute Illness       Estimated Daily Nutrient Needs:  Energy (kcal):  8205-7014 (25-30 kcal/kg IBW); Weight Used for Energy Requirements:  Ideal     Protein (g):  110-129 (1.2-1.4 g/kg IBW); Weight Used for Protein Requirements:  Ideal        Fluid (ml/day):  1990-9236 (1 ml/kcal); Method Used for Fluid Requirements:  1 ml/kcal      Nutrition Related Findings:  A1C down to 7.3      Wounds:  None       Current Nutrition Therapies:    Dietary Nutrition Supplements: Standard High Calorie Oral Supplement  DIET GENERAL;     Anthropometric Measures:  · Height: 6' 4\" (193 cm)  · Current Body Weight: 200 lb (90.7 kg)(stated)   · Admission Body Weight: (?)    · Usual Body Weight: (?)     · Ideal Body Weight: 202 lbs; % Ideal Body Weight 99 %   · BMI: 24.4  · BMI Categories: Normal Weight (BMI 22.0 to 24.9) age over 72       Nutrition Diagnosis:   · Predicted inadequate energy intake related to pain as evidenced by poor intake prior to admission, GI abnormality    Nutrition Interventions:   Food and/or Nutrient Delivery:  Continue Current Diet, Modify Oral Nutrition Supplement  Nutrition Education/Counseling:  No recommendation at this time   Coordination of Nutrition Care:  Continue to monitor while inpatient    Goals:  Pt will consume greater than half of his meals and supplements       Nutrition Monitoring and Evaluation:   Behavioral-Environmental Outcomes:  None Identified   Food/Nutrient Intake Outcomes:  Food and Nutrient Intake, Supplement Intake  Physical Signs/Symptoms Outcomes:  Biochemical Data, Constipation, GI Status, Nutrition Focused Physical Findings, Weight     Discharge Planning:     Too soon to determine     Electronically signed by Sherwin Miller RD, LD on 2/21/21 at 11:14 AM EST    Contact: [phone]

## 2021-02-21 NOTE — PROGRESS NOTES
GENERAL SURGERY PROGRESS NOTE    CC/HPI:           Patient feels the same diffuse abdominal pain, the day before yesterday IR percutaneously biopsied the abdominal mesenteric masses, had a BM yesterday. .feels better. Vitals:    02/21/21 0330 02/21/21 0733 02/21/21 0801 02/21/21 1101   BP: 127/72  131/78    Pulse: 96  85    Resp: 16  16    Temp: 98.3 °F (36.8 °C)  98 °F (36.7 °C)    TempSrc: Oral  Oral    SpO2: 93% 93% 97%    Weight:       Height:    6' 4\" (1.93 m)     I/O last 3 completed shifts:  In: -   Out: 800 [Urine:800]  No intake/output data recorded.     Dietary Nutrition Supplements: Standard High Calorie Oral Supplement  DIET GENERAL;    Recent Results (from the past 48 hour(s))   EKG 12 Lead    Collection Time: 02/19/21  5:08 PM   Result Value Ref Range    Ventricular Rate 130 BPM    Atrial Rate 130 BPM    P-R Interval 152 ms    QRS Duration 80 ms    Q-T Interval 278 ms    QTc Calculation (Bazett) 409 ms    P Axis 36 degrees    R Axis 17 degrees    T Axis 8 degrees    Diagnosis       Sinus tachycardia  Nonspecific T wave abnormality  Abnormal ECG  When compared with ECG of 05-NOV-2020 16:13,  ST no longer elevated in Inferior leads  Nonspecific T wave abnormality now evident in Inferior leads  Nonspecific T wave abnormality, worse in Lateral leads     POCT Glucose    Collection Time: 02/19/21  7:47 PM   Result Value Ref Range    POC Glucose 107 (H) 70 - 99 MG/DL   CBC auto differential    Collection Time: 02/20/21  7:30 AM   Result Value Ref Range    WBC 17.9 (H) 4.0 - 10.5 K/CU MM    RBC 4.88 4.6 - 6.2 M/CU MM    Hemoglobin 13.1 (L) 13.5 - 18.0 GM/DL    Hematocrit 40.8 (L) 42 - 52 %    MCV 83.6 78 - 100 FL    MCH 26.8 (L) 27 - 31 PG    MCHC 32.1 32.0 - 36.0 %    RDW 12.0 11.7 - 14.9 %    Platelets 664 150 - 919 K/CU MM    MPV 9.7 7.5 - 11.1 FL    Differential Type AUTOMATED DIFFERENTIAL     Segs Relative 82.6 (H) 36 - 66 %    Lymphocytes % 9.6 (L) 24 - 44 %    Monocytes % 6.3 (H) 0 - 4 % Eosinophils % 0.4 0 - 3 %    Basophils % 0.3 0 - 1 %    Segs Absolute 14.8 K/CU MM    Lymphocytes Absolute 1.7 K/CU MM    Monocytes Absolute 1.1 K/CU MM    Eosinophils Absolute 0.1 K/CU MM    Basophils Absolute 0.1 K/CU MM    Nucleated RBC % 0.0 %    Total Nucleated RBC 0.0 K/CU MM    Total Immature Neutrophil 0.14 K/CU MM    Immature Neutrophil % 0.8 (H) 0 - 0.43 %   Comprehensive Metabolic Panel w/ Reflex to MG    Collection Time: 02/20/21  7:30 AM   Result Value Ref Range    Sodium 133 (L) 135 - 145 MMOL/L    Potassium 4.4 3.5 - 5.1 MMOL/L    Chloride 98 (L) 99 - 110 mMol/L    CO2 21 21 - 32 MMOL/L    BUN 6 6 - 23 MG/DL    CREATININE 0.9 0.9 - 1.3 MG/DL    Glucose 120 (H) 70 - 99 MG/DL    Calcium 8.4 8.3 - 10.6 MG/DL    Albumin 3.4 3.4 - 5.0 GM/DL    Total Protein 6.5 6.4 - 8.2 GM/DL    Total Bilirubin 0.7 0.0 - 1.0 MG/DL    ALT 8 (L) 10 - 40 U/L    AST 10 (L) 15 - 37 IU/L    Alkaline Phosphatase 81 40 - 128 IU/L    GFR Non-African American >60 >60 mL/min/1.73m2    GFR African American >60 >60 mL/min/1.73m2    Anion Gap 14 4 - 16   POCT Glucose    Collection Time: 02/20/21  8:00 AM   Result Value Ref Range    POC Glucose 112 (H) 70 - 99 MG/DL   POCT Glucose    Collection Time: 02/20/21 12:25 PM   Result Value Ref Range    POC Glucose 130 (H) 70 - 99 MG/DL   POCT Glucose    Collection Time: 02/20/21  5:36 PM   Result Value Ref Range    POC Glucose 109 (H) 70 - 99 MG/DL   POCT Glucose    Collection Time: 02/20/21  7:59 PM   Result Value Ref Range    POC Glucose 147 (H) 70 - 99 MG/DL   CBC auto differential    Collection Time: 02/21/21  7:03 AM   Result Value Ref Range    WBC 10.3 4.0 - 10.5 K/CU MM    RBC 4.39 (L) 4.6 - 6.2 M/CU MM    Hemoglobin 11.6 (L) 13.5 - 18.0 GM/DL    Hematocrit 36.5 (L) 42 - 52 %    MCV 83.1 78 - 100 FL    MCH 26.4 (L) 27 - 31 PG    MCHC 31.8 (L) 32.0 - 36.0 %    RDW 12.2 11.7 - 14.9 %    Platelets 436 729 - 507 K/CU MM    MPV 10.1 7.5 - 11.1 FL    Differential Type AUTOMATED DIFFERENTIAL Segs Relative 73.4 (H) 36 - 66 %    Lymphocytes % 16.3 (L) 24 - 44 %    Monocytes % 6.8 (H) 0 - 4 %    Eosinophils % 2.6 0 - 3 %    Basophils % 0.4 0 - 1 %    Segs Absolute 7.6 K/CU MM    Lymphocytes Absolute 1.7 K/CU MM    Monocytes Absolute 0.7 K/CU MM    Eosinophils Absolute 0.3 K/CU MM    Basophils Absolute 0.0 K/CU MM    Nucleated RBC % 0.0 %    Total Nucleated RBC 0.0 K/CU MM    Total Immature Neutrophil 0.05 K/CU MM    Immature Neutrophil % 0.5 (H) 0 - 0.43 %   Comprehensive Metabolic Panel w/ Reflex to MG    Collection Time: 02/21/21  7:03 AM   Result Value Ref Range    Sodium 135 135 - 145 MMOL/L    Potassium 4.1 3.5 - 5.1 MMOL/L    Chloride 100 99 - 110 mMol/L    CO2 26 21 - 32 MMOL/L    BUN 4 (L) 6 - 23 MG/DL    CREATININE 0.8 (L) 0.9 - 1.3 MG/DL    Glucose 130 (H) 70 - 99 MG/DL    Calcium 8.1 (L) 8.3 - 10.6 MG/DL    Albumin 3.0 (L) 3.4 - 5.0 GM/DL    Total Protein 6.1 (L) 6.4 - 8.2 GM/DL    Total Bilirubin 0.4 0.0 - 1.0 MG/DL    ALT 10 10 - 40 U/L    AST 13 (L) 15 - 37 IU/L    Alkaline Phosphatase 71 40 - 128 IU/L    GFR Non-African American >60 >60 mL/min/1.73m2    GFR African American >60 >60 mL/min/1.73m2    Anion Gap 9 4 - 16   POCT Glucose    Collection Time: 02/21/21  7:59 AM   Result Value Ref Range    POC Glucose 135 (H) 70 - 99 MG/DL       Scheduled Meds:   polyethylene glycol  17 g Oral Daily    magnesium hydroxide  30 mL Oral Daily    piperacillin-tazobactam  3,375 mg Intravenous Q8H    sodium chloride flush  10 mL Intravenous 2 times per day    insulin lispro  0-12 Units Subcutaneous 4x Daily WC       Continuous Infusions:   sodium chloride 125 mL/hr at 02/20/21 2039    dextrose         Physical Exam:  HEENT: Anicteric sclerae, Oropharyngeal mucosae moist, pink and intact. Heart:  Normal S1 and S2, RRR  Lungs: Clear to auscultation bilaterally, No audible Wheezes or Rales. Extremities: No edema.   Neuro: Alert and Oriented x 3, Non focal.  Abdomen: Soft, Benign, Diffusely tender, Non distended, Positive bowel sounds. Active Problems:    Mesenteric mass  Resolved Problems:    * No resolved hospital problems. *      Assessment and Plan:  Roberto Vidal is a 23 y.o. male with Mesenteric masses, loss of weight, 30 Lbs over the past 4 months. . major symptoms. Madhavi Clark IR percutaneously biopsied the abdominal mesenteric masses. Will check tumor markers - ordered, & so far: CEA 1.1 NOT elevated. Awaiting IR biopsies PATHOLOGY, to determine management. Help bowel constipation, ordered Miralax and MOM.     ___________________________________________    Nirmala Galarza MD, FACS, FICS  2/21/2021  11:23 AM

## 2021-02-21 NOTE — PLAN OF CARE
Problem: Pain:  Description: Pain management should include both nonpharmacologic and pharmacologic interventions.   Goal: Pain level will decrease  Description: Pain level will decrease  2/21/2021 1237 by Nan Garcia RN  Outcome: Ongoing  2/20/2021 2306 by Aneudy Olivares RN  Outcome: Ongoing  Goal: Control of acute pain  Description: Control of acute pain  2/21/2021 1237 by Nan Garcia RN  Outcome: Ongoing  2/20/2021 2306 by Aneudy Olivares RN  Outcome: Ongoing  Goal: Control of chronic pain  Description: Control of chronic pain  2/21/2021 1237 by Nan Garcia RN  Outcome: Ongoing  2/20/2021 2306 by Aneudy Olivares RN  Outcome: Ongoing     Problem: Safety:  Goal: Free from accidental physical injury  Description: Free from accidental physical injury  2/21/2021 1237 by Nan Garcia RN  Outcome: Ongoing  2/20/2021 2306 by Aneudy Olivares RN  Outcome: Ongoing  Goal: Free from intentional harm  Description: Free from intentional harm  2/21/2021 1237 by Nan Garcia RN  Outcome: Ongoing  2/20/2021 2306 by Aneudy Olivares RN  Outcome: Ongoing     Problem: Daily Care:  Goal: Daily care needs are met  Description: Daily care needs are met  2/21/2021 1237 by Nan Garcia RN  Outcome: Ongoing  2/20/2021 2306 by Aneudy Olivares RN  Outcome: Ongoing     Problem: Discharge Planning:  Goal: Patients continuum of care needs are met  Description: Patients continuum of care needs are met  2/21/2021 1237 by Nan Garcia RN  Outcome: Ongoing  2/20/2021 2306 by Aneudy Olivares RN  Outcome: Ongoing     Problem: Discharge Planning:  Goal: Patients continuum of care needs are met  Description: Patients continuum of care needs are met  2/21/2021 1237 by Nan Garcia RN  Outcome: Ongoing  2/20/2021 2306 by Aneudy Olivares RN  Outcome: Ongoing     Problem: Falls - Risk of:  Goal: Will remain free from falls  Description: Will remain free from falls  Outcome: Ongoing  Goal: Absence of physical injury  Description: Absence of physical injury  Outcome: Ongoing

## 2021-02-22 LAB
ALBUMIN SERPL-MCNC: 3 GM/DL (ref 3.4–5)
ALP BLD-CCNC: 73 IU/L (ref 40–129)
ALT SERPL-CCNC: 13 U/L (ref 10–40)
ANION GAP SERPL CALCULATED.3IONS-SCNC: 10 MMOL/L (ref 4–16)
AST SERPL-CCNC: 13 IU/L (ref 15–37)
BASOPHILS ABSOLUTE: 0.1 K/CU MM
BASOPHILS RELATIVE PERCENT: 0.5 % (ref 0–1)
BILIRUB SERPL-MCNC: 0.4 MG/DL (ref 0–1)
BUN BLDV-MCNC: 5 MG/DL (ref 6–23)
CALCIUM SERPL-MCNC: 8.9 MG/DL (ref 8.3–10.6)
CHLORIDE BLD-SCNC: 100 MMOL/L (ref 99–110)
CO2: 28 MMOL/L (ref 21–32)
CREAT SERPL-MCNC: 0.8 MG/DL (ref 0.9–1.3)
DIFFERENTIAL TYPE: ABNORMAL
EOSINOPHILS ABSOLUTE: 0.3 K/CU MM
EOSINOPHILS RELATIVE PERCENT: 3 % (ref 0–3)
GFR AFRICAN AMERICAN: >60 ML/MIN/1.73M2
GFR NON-AFRICAN AMERICAN: >60 ML/MIN/1.73M2
GLUCOSE BLD-MCNC: 109 MG/DL (ref 70–99)
GLUCOSE BLD-MCNC: 114 MG/DL (ref 70–99)
GLUCOSE BLD-MCNC: 114 MG/DL (ref 70–99)
GLUCOSE BLD-MCNC: 118 MG/DL (ref 70–99)
GLUCOSE BLD-MCNC: 143 MG/DL (ref 70–99)
HCT VFR BLD CALC: 39.3 % (ref 42–52)
HEMOGLOBIN: 12.6 GM/DL (ref 13.5–18)
IMMATURE NEUTROPHIL %: 0.5 % (ref 0–0.43)
LYMPHOCYTES ABSOLUTE: 1.8 K/CU MM
LYMPHOCYTES RELATIVE PERCENT: 16.6 % (ref 24–44)
MCH RBC QN AUTO: 26.6 PG (ref 27–31)
MCHC RBC AUTO-ENTMCNC: 32.1 % (ref 32–36)
MCV RBC AUTO: 83.1 FL (ref 78–100)
MONOCYTES ABSOLUTE: 0.7 K/CU MM
MONOCYTES RELATIVE PERCENT: 6.8 % (ref 0–4)
NUCLEATED RBC %: 0 %
PDW BLD-RTO: 12.2 % (ref 11.7–14.9)
PLATELET # BLD: 334 K/CU MM (ref 140–440)
PMV BLD AUTO: 9.3 FL (ref 7.5–11.1)
POTASSIUM SERPL-SCNC: 4.2 MMOL/L (ref 3.5–5.1)
RBC # BLD: 4.73 M/CU MM (ref 4.6–6.2)
SEGMENTED NEUTROPHILS ABSOLUTE COUNT: 7.8 K/CU MM
SEGMENTED NEUTROPHILS RELATIVE PERCENT: 72.6 % (ref 36–66)
SODIUM BLD-SCNC: 138 MMOL/L (ref 135–145)
TOTAL IMMATURE NEUTOROPHIL: 0.05 K/CU MM
TOTAL NUCLEATED RBC: 0 K/CU MM
TOTAL PROTEIN: 6.5 GM/DL (ref 6.4–8.2)
WBC # BLD: 10.7 K/CU MM (ref 4–10.5)

## 2021-02-22 PROCEDURE — 2580000003 HC RX 258: Performed by: INTERNAL MEDICINE

## 2021-02-22 PROCEDURE — 6370000000 HC RX 637 (ALT 250 FOR IP): Performed by: INTERNAL MEDICINE

## 2021-02-22 PROCEDURE — 6360000002 HC RX W HCPCS: Performed by: INTERNAL MEDICINE

## 2021-02-22 PROCEDURE — 1200000000 HC SEMI PRIVATE

## 2021-02-22 PROCEDURE — 80053 COMPREHEN METABOLIC PANEL: CPT

## 2021-02-22 PROCEDURE — 94761 N-INVAS EAR/PLS OXIMETRY MLT: CPT

## 2021-02-22 PROCEDURE — 82962 GLUCOSE BLOOD TEST: CPT

## 2021-02-22 PROCEDURE — 6370000000 HC RX 637 (ALT 250 FOR IP): Performed by: SURGERY

## 2021-02-22 PROCEDURE — 85025 COMPLETE CBC W/AUTO DIFF WBC: CPT

## 2021-02-22 PROCEDURE — 2709999900 HC NON-CHARGEABLE SUPPLY

## 2021-02-22 PROCEDURE — 99231 SBSQ HOSP IP/OBS SF/LOW 25: CPT | Performed by: SURGERY

## 2021-02-22 PROCEDURE — 36415 COLL VENOUS BLD VENIPUNCTURE: CPT

## 2021-02-22 RX ORDER — NICOTINE 21 MG/24HR
1 PATCH, TRANSDERMAL 24 HOURS TRANSDERMAL DAILY
Status: CANCELLED | OUTPATIENT
Start: 2021-02-22

## 2021-02-22 RX ORDER — NICOTINE 21 MG/24HR
1 PATCH, TRANSDERMAL 24 HOURS TRANSDERMAL DAILY
Status: DISCONTINUED | OUTPATIENT
Start: 2021-02-22 | End: 2021-02-25 | Stop reason: HOSPADM

## 2021-02-22 RX ADMIN — SODIUM CHLORIDE, PRESERVATIVE FREE 10 ML: 5 INJECTION INTRAVENOUS at 09:44

## 2021-02-22 RX ADMIN — ACETAMINOPHEN 650 MG: 325 TABLET ORAL at 22:04

## 2021-02-22 RX ADMIN — PIPERACILLIN AND TAZOBACTAM 3375 MG: 3; .375 INJECTION, POWDER, LYOPHILIZED, FOR SOLUTION INTRAVENOUS at 09:43

## 2021-02-22 RX ADMIN — MAGNESIUM HYDROXIDE 30 ML: 400 SUSPENSION ORAL at 09:42

## 2021-02-22 RX ADMIN — PIPERACILLIN AND TAZOBACTAM 3375 MG: 3; .375 INJECTION, POWDER, LYOPHILIZED, FOR SOLUTION INTRAVENOUS at 17:30

## 2021-02-22 RX ADMIN — POLYETHYLENE GLYCOL (3350) 17 G: 17 POWDER, FOR SOLUTION ORAL at 09:42

## 2021-02-22 ASSESSMENT — PAIN DESCRIPTION - PAIN TYPE: TYPE: ACUTE PAIN

## 2021-02-22 ASSESSMENT — PAIN DESCRIPTION - PROGRESSION: CLINICAL_PROGRESSION: NOT CHANGED

## 2021-02-22 ASSESSMENT — PAIN DESCRIPTION - FREQUENCY: FREQUENCY: INTERMITTENT

## 2021-02-22 ASSESSMENT — PAIN - FUNCTIONAL ASSESSMENT: PAIN_FUNCTIONAL_ASSESSMENT: ACTIVITIES ARE NOT PREVENTED

## 2021-02-22 ASSESSMENT — PAIN DESCRIPTION - ONSET: ONSET: AWAKENED FROM SLEEP

## 2021-02-22 NOTE — PROGRESS NOTES
GENERAL SURGERY PROGRESS NOTE    CC/HPI:           Patient feels the same diffuse abdominal pain, the day before yesterday IR percutaneously biopsied the abdominal mesenteric masses, had a BM yesterday. .feels better. Vitals:    02/21/21 1101 02/21/21 1643 02/21/21 2130 02/22/21 0324   BP:  121/77 112/77 126/77   Pulse:  79 89 90   Resp:  16 16 16   Temp:  98.1 °F (36.7 °C) 98.1 °F (36.7 °C) 98 °F (36.7 °C)   TempSrc:  Oral Oral Oral   SpO2:  95% 96% 96%   Weight:       Height: 6' 4\" (1.93 m)        No intake/output data recorded. No intake/output data recorded.     DIET GENERAL;  Dietary Nutrition Supplements: Diabetic Oral Supplement  Dietary Nutrition Supplements: Frozen Oral Supplement    Recent Results (from the past 48 hour(s))   POCT Glucose    Collection Time: 02/20/21  8:00 AM   Result Value Ref Range    POC Glucose 112 (H) 70 - 99 MG/DL   POCT Glucose    Collection Time: 02/20/21 12:25 PM   Result Value Ref Range    POC Glucose 130 (H) 70 - 99 MG/DL   POCT Glucose    Collection Time: 02/20/21  5:36 PM   Result Value Ref Range    POC Glucose 109 (H) 70 - 99 MG/DL   POCT Glucose    Collection Time: 02/20/21  7:59 PM   Result Value Ref Range    POC Glucose 147 (H) 70 - 99 MG/DL   CBC auto differential    Collection Time: 02/21/21  7:03 AM   Result Value Ref Range    WBC 10.3 4.0 - 10.5 K/CU MM    RBC 4.39 (L) 4.6 - 6.2 M/CU MM    Hemoglobin 11.6 (L) 13.5 - 18.0 GM/DL    Hematocrit 36.5 (L) 42 - 52 %    MCV 83.1 78 - 100 FL    MCH 26.4 (L) 27 - 31 PG    MCHC 31.8 (L) 32.0 - 36.0 %    RDW 12.2 11.7 - 14.9 %    Platelets 386 133 - 149 K/CU MM    MPV 10.1 7.5 - 11.1 FL    Differential Type AUTOMATED DIFFERENTIAL     Segs Relative 73.4 (H) 36 - 66 %    Lymphocytes % 16.3 (L) 24 - 44 %    Monocytes % 6.8 (H) 0 - 4 %    Eosinophils % 2.6 0 - 3 %    Basophils % 0.4 0 - 1 %    Segs Absolute 7.6 K/CU MM    Lymphocytes Absolute 1.7 K/CU MM    Monocytes Absolute 0.7 K/CU MM    Eosinophils Absolute 0.3 K/CU MM    Basophils Absolute 0.0 K/CU MM    Nucleated RBC % 0.0 %    Total Nucleated RBC 0.0 K/CU MM    Total Immature Neutrophil 0.05 K/CU MM    Immature Neutrophil % 0.5 (H) 0 - 0.43 %   Comprehensive Metabolic Panel w/ Reflex to MG    Collection Time: 02/21/21  7:03 AM   Result Value Ref Range    Sodium 135 135 - 145 MMOL/L    Potassium 4.1 3.5 - 5.1 MMOL/L    Chloride 100 99 - 110 mMol/L    CO2 26 21 - 32 MMOL/L    BUN 4 (L) 6 - 23 MG/DL    CREATININE 0.8 (L) 0.9 - 1.3 MG/DL    Glucose 130 (H) 70 - 99 MG/DL    Calcium 8.1 (L) 8.3 - 10.6 MG/DL    Albumin 3.0 (L) 3.4 - 5.0 GM/DL    Total Protein 6.1 (L) 6.4 - 8.2 GM/DL    Total Bilirubin 0.4 0.0 - 1.0 MG/DL    ALT 10 10 - 40 U/L    AST 13 (L) 15 - 37 IU/L    Alkaline Phosphatase 71 40 - 128 IU/L    GFR Non-African American >60 >60 mL/min/1.73m2    GFR African American >60 >60 mL/min/1.73m2    Anion Gap 9 4 - 16   C-Reactive Protein    Collection Time: 02/21/21  7:28 AM   Result Value Ref Range    CRP, High Sensitivity >300.0 mg/L   Procalcitonin    Collection Time: 02/21/21  7:28 AM   Result Value Ref Range    Procalcitonin 1.09    POCT Glucose    Collection Time: 02/21/21  7:59 AM   Result Value Ref Range    POC Glucose 135 (H) 70 - 99 MG/DL   POCT Glucose    Collection Time: 02/21/21 12:31 PM   Result Value Ref Range    POC Glucose 124 (H) 70 - 99 MG/DL   POCT Glucose    Collection Time: 02/21/21  5:14 PM   Result Value Ref Range    POC Glucose 157 (H) 70 - 99 MG/DL   POCT Glucose    Collection Time: 02/21/21  9:12 PM   Result Value Ref Range    POC Glucose 110 (H) 70 - 99 MG/DL   CBC auto differential    Collection Time: 02/22/21  6:21 AM   Result Value Ref Range    WBC 10.7 (H) 4.0 - 10.5 K/CU MM    RBC 4.73 4.6 - 6.2 M/CU MM    Hemoglobin 12.6 (L) 13.5 - 18.0 GM/DL    Hematocrit 39.3 (L) 42 - 52 %    MCV 83.1 78 - 100 FL    MCH 26.6 (L) 27 - 31 PG    MCHC 32.1 32.0 - 36.0 %    RDW 12.2 11.7 - 14.9 %    Platelets 416 018 - 565 K/CU MM    MPV 9.3 7.5 - 11.1 FL Differential Type AUTOMATED DIFFERENTIAL     Segs Relative 72.6 (H) 36 - 66 %    Lymphocytes % 16.6 (L) 24 - 44 %    Monocytes % 6.8 (H) 0 - 4 %    Eosinophils % 3.0 0 - 3 %    Basophils % 0.5 0 - 1 %    Segs Absolute 7.8 K/CU MM    Lymphocytes Absolute 1.8 K/CU MM    Monocytes Absolute 0.7 K/CU MM    Eosinophils Absolute 0.3 K/CU MM    Basophils Absolute 0.1 K/CU MM    Nucleated RBC % 0.0 %    Total Nucleated RBC 0.0 K/CU MM    Total Immature Neutrophil 0.05 K/CU MM    Immature Neutrophil % 0.5 (H) 0 - 0.43 %   Comprehensive Metabolic Panel w/ Reflex to MG    Collection Time: 02/22/21  6:21 AM   Result Value Ref Range    Sodium 138 135 - 145 MMOL/L    Potassium 4.2 3.5 - 5.1 MMOL/L    Chloride 100 99 - 110 mMol/L    CO2 28 21 - 32 MMOL/L    BUN 5 (L) 6 - 23 MG/DL    CREATININE 0.8 (L) 0.9 - 1.3 MG/DL    Glucose 118 (H) 70 - 99 MG/DL    Calcium 8.9 8.3 - 10.6 MG/DL    Albumin 3.0 (L) 3.4 - 5.0 GM/DL    Total Protein 6.5 6.4 - 8.2 GM/DL    Total Bilirubin 0.4 0.0 - 1.0 MG/DL    ALT 13 10 - 40 U/L    AST 13 (L) 15 - 37 IU/L    Alkaline Phosphatase 73 40 - 129 IU/L    GFR Non-African American >60 >60 mL/min/1.73m2    GFR African American >60 >60 mL/min/1.73m2    Anion Gap 10 4 - 16       Scheduled Meds:   polyethylene glycol  17 g Oral Daily    magnesium hydroxide  30 mL Oral Daily    piperacillin-tazobactam  3,375 mg Intravenous Q8H    sodium chloride flush  10 mL Intravenous 2 times per day    insulin lispro  0-12 Units Subcutaneous 4x Daily WC       Continuous Infusions:   dextrose         Physical Exam:  HEENT: Anicteric sclerae, Oropharyngeal mucosae moist, pink and intact. Heart:  Normal S1 and S2, RRR  Lungs: Clear to auscultation bilaterally, No audible Wheezes or Rales. Extremities: No edema. Neuro: Alert and Oriented x 3, Non focal.  Abdomen: Soft, Benign, Diffusely tender, Non distended, Positive bowel sounds.     Active Problems:    Mesenteric mass  Resolved Problems:    * No resolved hospital problems. *      Assessment and Plan:  Tanya Barriga is a 23 y.o. male with Mesenteric masses, loss of weight, 30 Lbs over the past 4 months. . major symptoms. Carolin Ill IR percutaneously biopsied the abdominal mesenteric masses. Will check tumor markers - ordered, & so far: CEA 1.1 NOT elevated. Awaiting IR biopsies PATHOLOGY, to determine management. Help bowel constipation, ordered Miralax and MOM.     ___________________________________________    Mari Loaiza MD, FACS, FICS  2/22/2021  7:56 AM

## 2021-02-22 NOTE — PLAN OF CARE
Problem: Pain:  Description: Pain management should include both nonpharmacologic and pharmacologic interventions. Goal: Pain level will decrease  Description: Pain level will decrease  2/22/2021 0023 by Daphney Ivey RN  Outcome: Ongoing     Problem: Pain:  Description: Pain management should include both nonpharmacologic and pharmacologic interventions. Goal: Control of acute pain  Description: Control of acute pain  2/22/2021 0023 by Daphney Ivey RN  Outcome: Ongoing     Problem: Pain:  Description: Pain management should include both nonpharmacologic and pharmacologic interventions.   Goal: Control of chronic pain  Description: Control of chronic pain  2/22/2021 0023 by Daphney Ivey RN  Outcome: Ongoing     Problem: Safety:  Goal: Free from accidental physical injury  Description: Free from accidental physical injury  2/22/2021 0023 by Daphney Ivey RN  Outcome: Ongoing     Problem: Safety:  Goal: Free from intentional harm  Description: Free from intentional harm  2/22/2021 0023 by Daphney Ivey RN  Outcome: Ongoing     Problem: Daily Care:  Goal: Daily care needs are met  Description: Daily care needs are met  2/22/2021 0023 by Daphney Ivey RN  Outcome: Ongoing     Problem: Discharge Planning:  Goal: Patients continuum of care needs are met  Description: Patients continuum of care needs are met  2/22/2021 0023 by Daphney Ivey RN  Outcome: Ongoing     Problem: Falls - Risk of:  Goal: Will remain free from falls  Description: Will remain free from falls  2/22/2021 0023 by Daphney Ivey RN  Outcome: Ongoing     Problem: Falls - Risk of:  Goal: Absence of physical injury  Description: Absence of physical injury  2/22/2021 0023 by Daphney Ivey RN  Outcome: Ongoing

## 2021-02-22 NOTE — PROGRESS NOTES
Hospitalist Progress Note      Name:  Akira Frausto /Age/Sex: 1995  (23 y.o. male)   MRN & CSN:  [id number] & [id number] Admission Date/Time: 2021 10:04 PM   Location:  [address] [place] [address] PCP: No primary care provider on file. Hospital Day: 6    Assessment and Plan:   Akira Frausto is a 23 y.o.  male  who presents with  Abdominal pain.     · Mesenteric mass, concern for malignancy  · Constipation  CT abdomen/pelvis concerning for mesenteric masses   General surgery on board, tumor markers, CEA, AFP, ca19-9 negative  IR on board for biopsy 2021, biopsy pending  IV fluids, analgesics , antiemetics   Laxatives    · Unintentional weight loss, possible malignancy  · Moderate protein energy malnutrition  Supplements ordered  Being worked up  [employer] on board    · Sepsis from possible intra-abdominal infection   Meet SIRS criteria, tachycardic  Leukocytosis trend down  Procalcitonin and CRP elevated, trend  Check blood cultures, negative  UA negative  Received IV Vanco, Zosyn in ED  Resume IV Zosyn empirically, give IV Zosyn for total of 5 days, to end tomorrow    · Pseudohyponatremia, likely due to hypoglycemia, corrected within normal limits, monitor- improved  IV fluids    · Diabetes mellitus type 2, poor control  Patient lost significant weight  Not on any medications prior to admission, due to hypoglycemia  from previous medication  A1c-7.3  Sliding scale, endocrine on board      Called to pathology, biopsy still pending, hopefully in a day or 2, further management pending biopsy results  Diet DIET GENERAL;  Dietary Nutrition Supplements: Diabetic Oral Supplement  Dietary Nutrition Supplements: Frozen Oral Supplement   DVT Prophylaxis [] Lovenox, []  Heparin, [] SCDs, []No VTE prophylaxis, patient ambulating   GI Prophylaxis [] PPI, [] H2 Blocker, [] No GI prophylaxis, patient is receiving diet/Tube Feeds   Code Status Full Code   Disposition Patient requires continued admission due to MDM [] Low, [] Moderate,[]  High  Patient's risk as above due to      History of Present Illness:     Pt S&E. No complaints, doing much better, abdominal pain improved, tolerating diet, now moving bowels  10-14 point ROS reviewed negative, unless as noted above    Objective:     No intake or output data in the 24 hours ending 02/22/21 1242   Vitals:   Vitals:    02/22/21 0800   BP: 119/75   Pulse: 85   Resp: 16   Temp: 97.7 °F (36.5 °C)   SpO2: 97%     Physical Exam:    GEN Awake male, sitting upright in bed in no apparent distress. Appears given age. EYES Pupils are equally round. No scleral erythema, discharge, or conjunctivitis. HENT Mucous membranes are moist.   NECK No apparent thyromegaly or masses. RESP Clear to auscultation, no wheezes, rales or rhonchi. Symmetric chest movement while on room air. CARDIO/VASC S1/S2 auscultated. Regular rate without appreciable murmurs, rubs, or gallops. Peripheral pulses equal bilaterally and palpable. No peripheral edema. GI right lower quadrant pain, no masses palpable or guarding. Bowel sounds are normoactive. Rectal exam deferred.  Su catheter is not present. HEME/LYMPH No petechiae or ecchymoses. MSK No gross joint deformities. Spontaneous movement of all extremities  SKIN Normal coloration, warm, dry. NEURO Cranial nerves appear grossly intact, normal speech, no lateralizing weakness. PSYCH Awake, alert, oriented x 4. Affect appropriate.     Medications:   Medications:    polyethylene glycol  17 g Oral Daily    magnesium hydroxide  30 mL Oral Daily    piperacillin-tazobactam  3,375 mg Intravenous Q8H    sodium chloride flush  10 mL Intravenous 2 times per day    insulin lispro  0-12 Units Subcutaneous 4x Daily WC      Infusions:    dextrose       PRN Meds:     sodium chloride flush, 10 mL, PRN      promethazine, 12.5 mg, Q6H PRN    Or      ondansetron, 4 mg, Q6H PRN      acetaminophen, 650 mg, Q6H PRN    Or      acetaminophen, 650 mg, Q6H PRN      glucose, 15 g, PRN      dextrose, 12.5 g, PRN      glucagon (rDNA), 1 mg, PRN      dextrose, 100 mL/hr, PRN        Data    Recent Labs     02/20/21 0730 02/21/21 0703 02/22/21  0621   WBC 17.9* 10.3 10.7*   HGB 13.1* 11.6* 12.6*   HCT 40.8* 36.5* 39.3*    287 334      Recent Labs     02/20/21  0730 02/21/21  0703 02/22/21  0621   * 135 138   K 4.4 4.1 4.2   CL 98* 100 100   CO2 21 26 28   BUN 6 4* 5*   CREATININE 0.9 0.8* 0.8*     Recent Labs     02/20/21 0730 02/21/21 0703 02/22/21 0621   AST 10* 13* 13*   ALT 8* 10 13   BILITOT 0.7 0.4 0.4   ALKPHOS 81 71 73     No results for input(s): INR in the last 72 hours. No results for input(s): CKTOTAL, CKMB, CKMBINDEX, TROPONINI in the last 72 hours.         Electronically signed by Philip Johnson MD on 2/22/2021 at 12:42 PM

## 2021-02-23 ENCOUNTER — APPOINTMENT (OUTPATIENT)
Dept: CT IMAGING | Age: 26
DRG: 691 | End: 2021-02-23
Payer: COMMERCIAL

## 2021-02-23 LAB
ALBUMIN SERPL-MCNC: 3.2 GM/DL (ref 3.4–5)
ALP BLD-CCNC: 79 IU/L (ref 40–129)
ALT SERPL-CCNC: 14 U/L (ref 10–40)
ANION GAP SERPL CALCULATED.3IONS-SCNC: 11 MMOL/L (ref 4–16)
AST SERPL-CCNC: 13 IU/L (ref 15–37)
BASOPHILS ABSOLUTE: 0.1 K/CU MM
BASOPHILS RELATIVE PERCENT: 0.6 % (ref 0–1)
BILIRUB SERPL-MCNC: 0.4 MG/DL (ref 0–1)
BUN BLDV-MCNC: 6 MG/DL (ref 6–23)
CALCIUM SERPL-MCNC: 8.8 MG/DL (ref 8.3–10.6)
CHLORIDE BLD-SCNC: 99 MMOL/L (ref 99–110)
CO2: 27 MMOL/L (ref 21–32)
CREAT SERPL-MCNC: 0.7 MG/DL (ref 0.9–1.3)
DIFFERENTIAL TYPE: ABNORMAL
EOSINOPHILS ABSOLUTE: 0.3 K/CU MM
EOSINOPHILS RELATIVE PERCENT: 2.5 % (ref 0–3)
GFR AFRICAN AMERICAN: >60 ML/MIN/1.73M2
GFR NON-AFRICAN AMERICAN: >60 ML/MIN/1.73M2
GLUCOSE BLD-MCNC: 116 MG/DL (ref 70–99)
GLUCOSE BLD-MCNC: 130 MG/DL (ref 70–99)
GLUCOSE BLD-MCNC: 156 MG/DL (ref 70–99)
GLUCOSE BLD-MCNC: 166 MG/DL (ref 70–99)
GLUCOSE BLD-MCNC: 99 MG/DL (ref 70–99)
HAV IGM SER IA-ACNC: NON REACTIVE
HCT VFR BLD CALC: 39.8 % (ref 42–52)
HEMOGLOBIN: 12.8 GM/DL (ref 13.5–18)
HEPATITIS B CORE IGM ANTIBODY: NON REACTIVE
HEPATITIS B SURFACE ANTIGEN: NON REACTIVE
HEPATITIS C ANTIBODY: NON REACTIVE
IMMATURE NEUTROPHIL %: 0.7 % (ref 0–0.43)
LACTATE DEHYDROGENASE: 201 IU/L (ref 120–246)
LYMPHOCYTES ABSOLUTE: 1.7 K/CU MM
LYMPHOCYTES RELATIVE PERCENT: 17.6 % (ref 24–44)
MCH RBC QN AUTO: 26.4 PG (ref 27–31)
MCHC RBC AUTO-ENTMCNC: 32.2 % (ref 32–36)
MCV RBC AUTO: 82.1 FL (ref 78–100)
MONOCYTES ABSOLUTE: 0.7 K/CU MM
MONOCYTES RELATIVE PERCENT: 6.6 % (ref 0–4)
NUCLEATED RBC %: 0 %
PDW BLD-RTO: 12.2 % (ref 11.7–14.9)
PLATELET # BLD: 368 K/CU MM (ref 140–440)
PMV BLD AUTO: 9.2 FL (ref 7.5–11.1)
POTASSIUM SERPL-SCNC: 4 MMOL/L (ref 3.5–5.1)
RBC # BLD: 4.85 M/CU MM (ref 4.6–6.2)
SEGMENTED NEUTROPHILS ABSOLUTE COUNT: 7.1 K/CU MM
SEGMENTED NEUTROPHILS RELATIVE PERCENT: 72 % (ref 36–66)
SODIUM BLD-SCNC: 137 MMOL/L (ref 135–145)
TOTAL IMMATURE NEUTOROPHIL: 0.07 K/CU MM
TOTAL NUCLEATED RBC: 0 K/CU MM
TOTAL PROTEIN: 6.7 GM/DL (ref 6.4–8.2)
URIC ACID: 3.1 MG/DL (ref 3.5–7.2)
WBC # BLD: 9.8 K/CU MM (ref 4–10.5)

## 2021-02-23 PROCEDURE — 6370000000 HC RX 637 (ALT 250 FOR IP): Performed by: INTERNAL MEDICINE

## 2021-02-23 PROCEDURE — 83615 LACTATE (LD) (LDH) ENZYME: CPT

## 2021-02-23 PROCEDURE — 80074 ACUTE HEPATITIS PANEL: CPT

## 2021-02-23 PROCEDURE — 99232 SBSQ HOSP IP/OBS MODERATE 35: CPT | Performed by: SURGERY

## 2021-02-23 PROCEDURE — 2580000003 HC RX 258: Performed by: INTERNAL MEDICINE

## 2021-02-23 PROCEDURE — 80053 COMPREHEN METABOLIC PANEL: CPT

## 2021-02-23 PROCEDURE — 6360000004 HC RX CONTRAST MEDICATION: Performed by: INTERNAL MEDICINE

## 2021-02-23 PROCEDURE — 82962 GLUCOSE BLOOD TEST: CPT

## 2021-02-23 PROCEDURE — 85025 COMPLETE CBC W/AUTO DIFF WBC: CPT

## 2021-02-23 PROCEDURE — 84550 ASSAY OF BLOOD/URIC ACID: CPT

## 2021-02-23 PROCEDURE — 1200000000 HC SEMI PRIVATE

## 2021-02-23 PROCEDURE — 6360000002 HC RX W HCPCS: Performed by: INTERNAL MEDICINE

## 2021-02-23 PROCEDURE — 94761 N-INVAS EAR/PLS OXIMETRY MLT: CPT

## 2021-02-23 PROCEDURE — 71260 CT THORAX DX C+: CPT

## 2021-02-23 PROCEDURE — 87389 HIV-1 AG W/HIV-1&-2 AB AG IA: CPT

## 2021-02-23 PROCEDURE — 36415 COLL VENOUS BLD VENIPUNCTURE: CPT

## 2021-02-23 RX ADMIN — PIPERACILLIN AND TAZOBACTAM 3375 MG: 3; .375 INJECTION, POWDER, LYOPHILIZED, FOR SOLUTION INTRAVENOUS at 09:07

## 2021-02-23 RX ADMIN — ACETAMINOPHEN 650 MG: 325 TABLET ORAL at 13:49

## 2021-02-23 RX ADMIN — IOPAMIDOL 75 ML: 755 INJECTION, SOLUTION INTRAVENOUS at 19:13

## 2021-02-23 RX ADMIN — SODIUM CHLORIDE, PRESERVATIVE FREE 10 ML: 5 INJECTION INTRAVENOUS at 09:09

## 2021-02-23 RX ADMIN — PIPERACILLIN AND TAZOBACTAM 3375 MG: 3; .375 INJECTION, POWDER, LYOPHILIZED, FOR SOLUTION INTRAVENOUS at 01:21

## 2021-02-23 RX ADMIN — PIPERACILLIN AND TAZOBACTAM 3375 MG: 3; .375 INJECTION, POWDER, LYOPHILIZED, FOR SOLUTION INTRAVENOUS at 17:23

## 2021-02-23 RX ADMIN — INSULIN LISPRO 2 UNITS: 100 INJECTION, SOLUTION INTRAVENOUS; SUBCUTANEOUS at 12:44

## 2021-02-23 NOTE — PROGRESS NOTES
Progress Note  Date:2021       HLNE:[id number]  Patient Lorena Hernandez     YOB: 1995     Age:25 y.o. Subjective    Subjective:  Symptoms:  Stable. Diet:  Adequate intake. Pain:  He complains of pain that is mild. Review of Systems  Objective         Vitals Last 24 Hours:  TEMPERATURE:  Temp  Av.1 °F (36.7 °C)  Min: 97.7 °F (36.5 °C)  Max: 98.4 °F (36.9 °C)  RESPIRATIONS RANGE: Resp  Avg: 15  Min: 14  Max: 16  PULSE OXIMETRY RANGE: SpO2  Av.6 %  Min: 95 %  Max: 98 %  PULSE RANGE: Pulse  Av.5  Min: 77  Max: 94  BLOOD PRESSURE RANGE: Systolic (20CUD), NCC:010 , Min:119 , CSL:076   ; Diastolic (27FSB), UAR:65, Min:75, Max:89    I/O (24Hr): No intake or output data in the 24 hours ending 21 0739  Objective:  General Appearance:  Comfortable. Vital signs: (most recent): Blood pressure 127/81, pulse 88, temperature 98 °F (36.7 °C), temperature source Oral, resp. rate 16, height 6' 4\" (1.93 m), weight 200 lb (90.7 kg), SpO2 97 %. Vital signs are normal.    HEENT: Normal HEENT exam.    Lungs:  Normal effort. Heart: Normal rate. Abdomen: Abdomen is soft. Extremities: Decreased range of motion. Neurological: Patient is alert. Pupils:  Pupils are equal, round, and reactive to light. Skin:  Warm. Labs/Imaging/Diagnostics    Labs:  CBC:  Recent Labs     21  0703 21  0621   WBC 10.3 10.7*   RBC 4.39* 4.73   HGB 11.6* 12.6*   HCT 36.5* 39.3*   MCV 83.1 83.1   RDW 12.2 12.2    334     CHEMISTRIES:  Recent Labs     21  0703 21  0621    138   K 4.1 4.2    100   CO2 26 28   BUN 4* 5*   CREATININE 0.8* 0.8*   GLUCOSE 130* 118*     PT/INR:No results for input(s): PROTIME, INR in the last 72 hours. APTT:No results for input(s): APTT in the last 72 hours.   LIVER PROFILE:  Recent Labs     21  0703 21  0621   AST 13* 13*   ALT 10 13   BILITOT 0.4 0.4   ALKPHOS 71 73       Imaging Last 24

## 2021-02-23 NOTE — PROGRESS NOTES
Physician Progress Note      PATIENTGilles Valentín CHACKO #:                  [id number]  :                       1995  ADMIT DATE:       2021 10:04 PM  [address] [place] DATE:  RESPONDING  PROVIDER #:        Sindy Hicks MD          QUERY TEXT:    Dear Hospitalist    Pt admitted with intra-abdominal infection/ mass Pt noted to have Sepsis   documented in the  PN. If possible, please document in the progress notes   and discharge summary if you are evaluating and /or treating any of the   following: The medical record reflects the following:  Risk Factors: intra-abdominal infection  Clinical Indicators: WBC 28.3,, \" Sepsis from possible intra-abdominal   infection, Meet SIRS criteria, Procalcitonin and CRP elevated \" Documented in   the  PN  Treatment: IV fluids, morphine, IV insulin 10 units, vancomycin, Zosyn. Thank you Mira Hankins RN, CDS ([phone])  Options provided:  -- Sepsis, present on admission  -- Sepsis, not present on admission  -- Other - I will add my own diagnosis  -- Disagree - Not applicable / Not valid  -- Disagree - Clinically unable to determine / Unknown  -- Refer to Clinical Documentation Reviewer    PROVIDER RESPONSE TEXT:    This patient has sepsis which was present on admission.     Query created by: Fina Lee on 2021 1:56 PM      Electronically signed by:  Sindy Hicks MD 2021 7:38 AM

## 2021-02-23 NOTE — PROGRESS NOTES
Progress Note( Dr. Jennifer Wilburn)  2/22/2021  Subjective:   Admit Date: 2/17/2021  PCP: No primary care provider on file. Admitted For : Abdominal pain and weight loss    Consulted For: Better control of blood glucose    Interval History: Brain for biopsy of mesenteric mass    Denies any chest pains,   Denies SOB . Denies nausea or vomiting. No new bowel or bladder symptoms. No intake or output data in the 24 hours ending 02/22/21 2339    DATA    CBC:   Recent Labs     02/20/21  0730 02/21/21  0703 02/22/21  0621   WBC 17.9* 10.3 10.7*   HGB 13.1* 11.6* 12.6*    287 334    CMP:  Recent Labs     02/20/21  0730 02/21/21  0703 02/22/21  0621   * 135 138   K 4.4 4.1 4.2   CL 98* 100 100   CO2 21 26 28   BUN 6 4* 5*   CREATININE 0.9 0.8* 0.8*   CALCIUM 8.4 8.1* 8.9   PROT 6.5 6.1* 6.5   LABALBU 3.4 3.0* 3.0*   BILITOT 0.7 0.4 0.4   ALKPHOS 81 71 73   AST 10* 13* 13*   ALT 8* 10 13     Lipids:   Lab Results   Component Value Date    HDL 29 11/07/2020     Glucose:  Recent Labs     02/22/21  1236 02/22/21  1646 02/22/21  2130   POCGLU 109* 114* 143*     [id number]:  Lab Results   Component Value Date    LABA1C 7.3 02/18/2021     High Sensitivity TSH: No results found for: TSHHS  Free T3: No results found for: FT3  Free T4:No results found for: T4FREE    Ct Guided Needle Placement    Result Date: 2/19/2021  PROCEDURE: CT GUIDED NDL PLACEMENT for mesentery mass biopsy. MODERATE CONSCIOUS SEDATION 2/19/2021 HISTORY: ORDERING SYSTEM PROVIDED HISTORY: mesenteric mass, mesenteric    The 17-gauge introducer needle was inserted using imaging guidance. The 18-gauge needle was then inserted in coaxial fashion and a biopsy from the mass was obtained. This was repeated for a total of 7 pieces of core biopsy samples. Core biopsy tissue specimens were submitted for histopathology assessment. All needles were removed and a sterile bandage applied. SPECIMENS SENT: For pathology evaluation.  COMPLICATIONS: Post-procedure images demonstrate no evidence of immediate massive bleeding. The patient tolerated the procedure well with no immediate complications. [facility] FINDINGS: Successful CT guided mesentery mass core needle biopsy as described above. Successful CT guided mesentery mass biopsy. Ct Abdomen Pelvis W Iv Contrast Additional Contrast? None    Result Date: 2/18/2021  EXAMINATION: CT OF THE ABDOMEN AND PELVIS WITH CONTRAST 2/17/2021 7:10 pm   1. Abnormal wall thickening involving the gastric antrum, duodenal C-loop, and proximal jejunum, which may be inflammatory or neoplastic in etiology. 2. Approximate 4.3 x 3.7 cm heterogeneous \"mass\", within the mesentery, with surrounding inflammation. Differential considerations would include a necrotic neoplasm, necrotic adenopathy related to lymphoma or metastatic disease, or an infectious process. An additional 2.2 x 2.1 cm mass with central low attenuation is present more caudally within the mesentery. No air bubbles are present within these collections to suggest abscesses. Multiple additional mesenteric lymph nodes are present. 3. 1.8 cm cystic lesion within the tail the pancreas, and may represent sequela of prior pancreatitis. 4. Moderate amount of free fluid present within the pelvis 5.  Surgical consultation recommended       Scheduled Medicines   Medications:    nicotine  1 patch Transdermal Daily    polyethylene glycol  17 g Oral Daily    magnesium hydroxide  30 mL Oral Daily    piperacillin-tazobactam  3,375 mg Intravenous Q8H    sodium chloride flush  10 mL Intravenous 2 times per day    insulin lispro  0-12 Units Subcutaneous 4x Daily WC      Infusions:    dextrose           Objective:   Vitals: BP (!) 152/89   Pulse 78   Temp 98.1 °F (36.7 °C) (Oral)   Resp 14   Ht 6' 4\" (1.93 m)   Wt 200 lb (90.7 kg)   SpO2 95%   BMI 24.34 kg/m²   General appearance: alert and cooperative with exam  Neck: no JVD or bruit  Thyroid : Normal lobes   Lungs: Has Vesicular Breath sounds   Heart:  regular rate and rhythm  Abdomen: soft, non-tender; bowel sounds normal; no masses,  no organomegaly  Musculoskeletal: Normal  Extremities: extremities normal, , no edema  Neurologic:  Awake, alert, oriented to name, place and time. Cranial nerves II-XII are grossly intact. Motor is  intact. Sensory is intact. ,  and gait is normal.    Assessment:     Patient Active Problem List:     Mesenteric mass     Diabetes mellitus     Weight loss      Plan:     1. Reviewed POC blood glucose . Labs and X ray results   2. Reviewed Current Medicines   3. On Correction bolus Humalog/ Basal Lantus Insulin regime  4. Monitor Blood glucose frequently  5. Modified  the dose of Insulin/ other medicines as needed   6. And add advanced almost normal diet  7. Waiting for biopsy report of omental mass  8. Will follow     .      Joyce Lynn MD

## 2021-02-24 ENCOUNTER — APPOINTMENT (OUTPATIENT)
Dept: GENERAL RADIOLOGY | Age: 26
DRG: 691 | End: 2021-02-24
Payer: COMMERCIAL

## 2021-02-24 ENCOUNTER — ANESTHESIA (OUTPATIENT)
Dept: OPERATING ROOM | Age: 26
DRG: 691 | End: 2021-02-24
Payer: COMMERCIAL

## 2021-02-24 ENCOUNTER — ANESTHESIA EVENT (OUTPATIENT)
Dept: OPERATING ROOM | Age: 26
DRG: 691 | End: 2021-02-24
Payer: COMMERCIAL

## 2021-02-24 VITALS — DIASTOLIC BLOOD PRESSURE: 66 MMHG | OXYGEN SATURATION: 89 % | SYSTOLIC BLOOD PRESSURE: 112 MMHG

## 2021-02-24 LAB
ALBUMIN SERPL-MCNC: 3.3 GM/DL (ref 3.4–5)
ALP BLD-CCNC: 82 IU/L (ref 40–129)
ALT SERPL-CCNC: 17 U/L (ref 10–40)
ANION GAP SERPL CALCULATED.3IONS-SCNC: 7 MMOL/L (ref 4–16)
AST SERPL-CCNC: 13 IU/L (ref 15–37)
BASOPHILS ABSOLUTE: 0.1 K/CU MM
BASOPHILS RELATIVE PERCENT: 0.5 % (ref 0–1)
BILIRUB SERPL-MCNC: 0.3 MG/DL (ref 0–1)
BUN BLDV-MCNC: 7 MG/DL (ref 6–23)
CALCIUM SERPL-MCNC: 9.3 MG/DL (ref 8.3–10.6)
CHLORIDE BLD-SCNC: 98 MMOL/L (ref 99–110)
CO2: 31 MMOL/L (ref 21–32)
CREAT SERPL-MCNC: 0.8 MG/DL (ref 0.9–1.3)
DIFFERENTIAL TYPE: ABNORMAL
EOSINOPHILS ABSOLUTE: 0.5 K/CU MM
EOSINOPHILS RELATIVE PERCENT: 4 % (ref 0–3)
GFR AFRICAN AMERICAN: >60 ML/MIN/1.73M2
GFR NON-AFRICAN AMERICAN: >60 ML/MIN/1.73M2
GLUCOSE BLD-MCNC: 122 MG/DL (ref 70–99)
GLUCOSE BLD-MCNC: 123 MG/DL (ref 70–99)
GLUCOSE BLD-MCNC: 126 MG/DL (ref 70–99)
GLUCOSE BLD-MCNC: 136 MG/DL (ref 70–99)
GLUCOSE BLD-MCNC: 168 MG/DL (ref 70–99)
HCT VFR BLD CALC: 43.4 % (ref 42–52)
HEMOGLOBIN: 13.7 GM/DL (ref 13.5–18)
HIV SCREEN: NON REACTIVE
IMMATURE NEUTROPHIL %: 0.8 % (ref 0–0.43)
LV EF: 50 %
LVEF MODALITY: NORMAL
LYMPHOCYTES ABSOLUTE: 2 K/CU MM
LYMPHOCYTES RELATIVE PERCENT: 16.9 % (ref 24–44)
MCH RBC QN AUTO: 26.6 PG (ref 27–31)
MCHC RBC AUTO-ENTMCNC: 31.6 % (ref 32–36)
MCV RBC AUTO: 84.1 FL (ref 78–100)
MONOCYTES ABSOLUTE: 0.9 K/CU MM
MONOCYTES RELATIVE PERCENT: 7.4 % (ref 0–4)
NUCLEATED RBC %: 0 %
PDW BLD-RTO: 12.3 % (ref 11.7–14.9)
PLATELET # BLD: 374 K/CU MM (ref 140–440)
PMV BLD AUTO: 9 FL (ref 7.5–11.1)
POTASSIUM SERPL-SCNC: 4.4 MMOL/L (ref 3.5–5.1)
RBC # BLD: 5.16 M/CU MM (ref 4.6–6.2)
SEGMENTED NEUTROPHILS ABSOLUTE COUNT: 8.3 K/CU MM
SEGMENTED NEUTROPHILS RELATIVE PERCENT: 70.4 % (ref 36–66)
SODIUM BLD-SCNC: 136 MMOL/L (ref 135–145)
TOTAL IMMATURE NEUTOROPHIL: 0.09 K/CU MM
TOTAL NUCLEATED RBC: 0 K/CU MM
TOTAL PROTEIN: 7.1 GM/DL (ref 6.4–8.2)
WBC # BLD: 11.8 K/CU MM (ref 4–10.5)

## 2021-02-24 PROCEDURE — 6370000000 HC RX 637 (ALT 250 FOR IP): Performed by: INTERNAL MEDICINE

## 2021-02-24 PROCEDURE — 2580000003 HC RX 258: Performed by: ANESTHESIOLOGY

## 2021-02-24 PROCEDURE — 6360000002 HC RX W HCPCS: Performed by: INTERNAL MEDICINE

## 2021-02-24 PROCEDURE — 36561 INSERT TUNNELED CV CATH: CPT | Performed by: SURGERY

## 2021-02-24 PROCEDURE — 1200000000 HC SEMI PRIVATE

## 2021-02-24 PROCEDURE — 6370000000 HC RX 637 (ALT 250 FOR IP): Performed by: SURGERY

## 2021-02-24 PROCEDURE — 0JH60WZ INSERTION OF TOTALLY IMPLANTABLE VASCULAR ACCESS DEVICE INTO CHEST SUBCUTANEOUS TISSUE AND FASCIA, OPEN APPROACH: ICD-10-PCS | Performed by: SURGERY

## 2021-02-24 PROCEDURE — 80053 COMPREHEN METABOLIC PANEL: CPT

## 2021-02-24 PROCEDURE — 2580000003 HC RX 258: Performed by: INTERNAL MEDICINE

## 2021-02-24 PROCEDURE — 2500000003 HC RX 250 WO HCPCS: Performed by: NURSE ANESTHETIST, CERTIFIED REGISTERED

## 2021-02-24 PROCEDURE — 2580000003 HC RX 258: Performed by: SURGERY

## 2021-02-24 PROCEDURE — 6360000002 HC RX W HCPCS: Performed by: SURGERY

## 2021-02-24 PROCEDURE — 3600000013 HC SURGERY LEVEL 3 ADDTL 15MIN: Performed by: SURGERY

## 2021-02-24 PROCEDURE — 77001 FLUOROGUIDE FOR VEIN DEVICE: CPT | Performed by: SURGERY

## 2021-02-24 PROCEDURE — 2709999900 HC NON-CHARGEABLE SUPPLY: Performed by: SURGERY

## 2021-02-24 PROCEDURE — 76937 US GUIDE VASCULAR ACCESS: CPT | Performed by: SURGERY

## 2021-02-24 PROCEDURE — C1788 PORT, INDWELLING, IMP: HCPCS | Performed by: SURGERY

## 2021-02-24 PROCEDURE — 02H633Z INSERTION OF INFUSION DEVICE INTO RIGHT ATRIUM, PERCUTANEOUS APPROACH: ICD-10-PCS | Performed by: SURGERY

## 2021-02-24 PROCEDURE — 99024 POSTOP FOLLOW-UP VISIT: CPT | Performed by: SURGERY

## 2021-02-24 PROCEDURE — 3600000003 HC SURGERY LEVEL 3 BASE: Performed by: SURGERY

## 2021-02-24 PROCEDURE — 85025 COMPLETE CBC W/AUTO DIFF WBC: CPT

## 2021-02-24 PROCEDURE — 3700000000 HC ANESTHESIA ATTENDED CARE: Performed by: SURGERY

## 2021-02-24 PROCEDURE — 3700000001 HC ADD 15 MINUTES (ANESTHESIA): Performed by: SURGERY

## 2021-02-24 PROCEDURE — 6360000002 HC RX W HCPCS: Performed by: NURSE ANESTHETIST, CERTIFIED REGISTERED

## 2021-02-24 PROCEDURE — 94761 N-INVAS EAR/PLS OXIMETRY MLT: CPT

## 2021-02-24 PROCEDURE — C1894 INTRO/SHEATH, NON-LASER: HCPCS | Performed by: SURGERY

## 2021-02-24 PROCEDURE — 93306 TTE W/DOPPLER COMPLETE: CPT

## 2021-02-24 PROCEDURE — 71045 X-RAY EXAM CHEST 1 VIEW: CPT

## 2021-02-24 PROCEDURE — 99255 IP/OBS CONSLTJ NEW/EST HI 80: CPT | Performed by: INTERNAL MEDICINE

## 2021-02-24 PROCEDURE — 2500000003 HC RX 250 WO HCPCS: Performed by: SURGERY

## 2021-02-24 PROCEDURE — 82962 GLUCOSE BLOOD TEST: CPT

## 2021-02-24 PROCEDURE — 76000 FLUOROSCOPY <1 HR PHYS/QHP: CPT

## 2021-02-24 PROCEDURE — 36415 COLL VENOUS BLD VENIPUNCTURE: CPT

## 2021-02-24 DEVICE — PORT INFUS L55CM 0.016ML 0.4ML CATH OD2.2MM ID1.4MM INTRO
Type: IMPLANTABLE DEVICE | Site: CHEST | Status: NON-FUNCTIONAL
Removed: 2022-06-14

## 2021-02-24 RX ORDER — MIDAZOLAM HYDROCHLORIDE 1 MG/ML
INJECTION INTRAMUSCULAR; INTRAVENOUS PRN
Status: DISCONTINUED | OUTPATIENT
Start: 2021-02-24 | End: 2021-02-24 | Stop reason: SDUPTHER

## 2021-02-24 RX ORDER — OXYCODONE HYDROCHLORIDE AND ACETAMINOPHEN 5; 325 MG/1; MG/1
2 TABLET ORAL EVERY 4 HOURS PRN
Status: DISCONTINUED | OUTPATIENT
Start: 2021-02-24 | End: 2021-02-25 | Stop reason: HOSPADM

## 2021-02-24 RX ORDER — CEFAZOLIN SODIUM 1 G/50ML
INJECTION, SOLUTION INTRAVENOUS PRN
Status: DISCONTINUED | OUTPATIENT
Start: 2021-02-24 | End: 2021-02-24 | Stop reason: SDUPTHER

## 2021-02-24 RX ORDER — KETAMINE HCL 50MG/ML(1)
SYRINGE (ML) INTRAVENOUS PRN
Status: DISCONTINUED | OUTPATIENT
Start: 2021-02-24 | End: 2021-02-24 | Stop reason: SDUPTHER

## 2021-02-24 RX ORDER — FENTANYL CITRATE 50 UG/ML
INJECTION, SOLUTION INTRAMUSCULAR; INTRAVENOUS PRN
Status: DISCONTINUED | OUTPATIENT
Start: 2021-02-24 | End: 2021-02-24 | Stop reason: SDUPTHER

## 2021-02-24 RX ORDER — PROPOFOL 10 MG/ML
INJECTION, EMULSION INTRAVENOUS PRN
Status: DISCONTINUED | OUTPATIENT
Start: 2021-02-24 | End: 2021-02-24 | Stop reason: SDUPTHER

## 2021-02-24 RX ORDER — HEPARIN SODIUM (PORCINE) LOCK FLUSH IV SOLN 100 UNIT/ML 100 UNIT/ML
SOLUTION INTRAVENOUS
Status: COMPLETED | OUTPATIENT
Start: 2021-02-24 | End: 2021-02-24

## 2021-02-24 RX ORDER — 0.9 % SODIUM CHLORIDE 0.9 %
INTRAVENOUS SOLUTION INTRAVENOUS CONTINUOUS PRN
Status: COMPLETED | OUTPATIENT
Start: 2021-02-24 | End: 2021-02-24

## 2021-02-24 RX ORDER — SODIUM CHLORIDE, SODIUM LACTATE, POTASSIUM CHLORIDE, CALCIUM CHLORIDE 600; 310; 30; 20 MG/100ML; MG/100ML; MG/100ML; MG/100ML
INJECTION, SOLUTION INTRAVENOUS CONTINUOUS
Status: DISCONTINUED | OUTPATIENT
Start: 2021-02-24 | End: 2021-02-25

## 2021-02-24 RX ORDER — LIDOCAINE HYDROCHLORIDE 20 MG/ML
INJECTION, SOLUTION INTRAVENOUS PRN
Status: DISCONTINUED | OUTPATIENT
Start: 2021-02-24 | End: 2021-02-24 | Stop reason: SDUPTHER

## 2021-02-24 RX ADMIN — MIDAZOLAM 2 MG: 1 INJECTION INTRAMUSCULAR; INTRAVENOUS at 15:38

## 2021-02-24 RX ADMIN — ACETAMINOPHEN 650 MG: 325 TABLET ORAL at 00:19

## 2021-02-24 RX ADMIN — FENTANYL CITRATE 50 MCG: 50 INJECTION INTRAMUSCULAR; INTRAVENOUS at 15:50

## 2021-02-24 RX ADMIN — INSULIN LISPRO 2 UNITS: 100 INJECTION, SOLUTION INTRAVENOUS; SUBCUTANEOUS at 21:27

## 2021-02-24 RX ADMIN — SODIUM CHLORIDE, PRESERVATIVE FREE 10 ML: 5 INJECTION INTRAVENOUS at 09:44

## 2021-02-24 RX ADMIN — POLYETHYLENE GLYCOL (3350) 17 G: 17 POWDER, FOR SOLUTION ORAL at 09:44

## 2021-02-24 RX ADMIN — FENTANYL CITRATE 50 MCG: 50 INJECTION INTRAMUSCULAR; INTRAVENOUS at 16:00

## 2021-02-24 RX ADMIN — CEFAZOLIN SODIUM 1 G: 1 INJECTION, SOLUTION INTRAVENOUS at 15:58

## 2021-02-24 RX ADMIN — LIDOCAINE HYDROCHLORIDE 80 MG: 20 INJECTION, SOLUTION INTRAVENOUS at 15:50

## 2021-02-24 RX ADMIN — PROPOFOL 520 MG: 10 INJECTION, EMULSION INTRAVENOUS at 15:50

## 2021-02-24 RX ADMIN — PIPERACILLIN AND TAZOBACTAM 3375 MG: 3; .375 INJECTION, POWDER, LYOPHILIZED, FOR SOLUTION INTRAVENOUS at 00:19

## 2021-02-24 RX ADMIN — SODIUM CHLORIDE, POTASSIUM CHLORIDE, SODIUM LACTATE AND CALCIUM CHLORIDE: 600; 310; 30; 20 INJECTION, SOLUTION INTRAVENOUS at 14:48

## 2021-02-24 RX ADMIN — Medication 25 MG: at 15:50

## 2021-02-24 ASSESSMENT — PULMONARY FUNCTION TESTS
PIF_VALUE: 1
PIF_VALUE: 0
PIF_VALUE: 1
PIF_VALUE: 0
PIF_VALUE: 1
PIF_VALUE: 0
PIF_VALUE: 1
PIF_VALUE: 0
PIF_VALUE: 0

## 2021-02-24 ASSESSMENT — LIFESTYLE VARIABLES: SMOKING_STATUS: 1

## 2021-02-24 ASSESSMENT — PAIN SCALES - GENERAL: PAINLEVEL_OUTOF10: 4

## 2021-02-24 NOTE — PROGRESS NOTES
GENERAL SURGERY PROGRESS NOTE    CC/HPI:           Patient feels better, and learned about the pathology, awaiting final path, and Onc recommendations. Vitals:    02/23/21 1149 02/23/21 1516 02/23/21 2145 02/24/21 0600   BP:  134/86 128/82 130/80   Pulse:  96 90 85   Resp:  16 16 15   Temp:  97.4 °F (36.3 °C) 97.9 °F (36.6 °C) 97.6 °F (36.4 °C)   TempSrc:  Oral Oral Oral   SpO2: 97% 96% 99% 95%   Weight:       Height:         No intake/output data recorded. No intake/output data recorded.     DIET GENERAL;  Dietary Nutrition Supplements: Diabetic Oral Supplement  Dietary Nutrition Supplements: Frozen Oral Supplement    Recent Results (from the past 48 hour(s))   POCT Glucose    Collection Time: 02/22/21  7:58 AM   Result Value Ref Range    POC Glucose 114 (H) 70 - 99 MG/DL   POCT Glucose    Collection Time: 02/22/21 12:36 PM   Result Value Ref Range    POC Glucose 109 (H) 70 - 99 MG/DL   POCT Glucose    Collection Time: 02/22/21  4:46 PM   Result Value Ref Range    POC Glucose 114 (H) 70 - 99 MG/DL   POCT Glucose    Collection Time: 02/22/21  9:30 PM   Result Value Ref Range    POC Glucose 143 (H) 70 - 99 MG/DL   CBC auto differential    Collection Time: 02/23/21  8:00 AM   Result Value Ref Range    WBC 9.8 4.0 - 10.5 K/CU MM    RBC 4.85 4.6 - 6.2 M/CU MM    Hemoglobin 12.8 (L) 13.5 - 18.0 GM/DL    Hematocrit 39.8 (L) 42 - 52 %    MCV 82.1 78 - 100 FL    MCH 26.4 (L) 27 - 31 PG    MCHC 32.2 32.0 - 36.0 %    RDW 12.2 11.7 - 14.9 %    Platelets 852 291 - 011 K/CU MM    MPV 9.2 7.5 - 11.1 FL    Differential Type AUTOMATED DIFFERENTIAL     Segs Relative 72.0 (H) 36 - 66 %    Lymphocytes % 17.6 (L) 24 - 44 %    Monocytes % 6.6 (H) 0 - 4 %    Eosinophils % 2.5 0 - 3 %    Basophils % 0.6 0 - 1 %    Segs Absolute 7.1 K/CU MM    Lymphocytes Absolute 1.7 K/CU MM    Monocytes Absolute 0.7 K/CU MM    Eosinophils Absolute 0.3 K/CU MM    Basophils Absolute 0.1 K/CU MM    Nucleated RBC % 0.0 %    Total Nucleated RBC 0.0 K/CU MM    Total Immature Neutrophil 0.07 K/CU MM    Immature Neutrophil % 0.7 (H) 0 - 0.43 %   Comprehensive Metabolic Panel w/ Reflex to MG    Collection Time: 02/23/21  8:00 AM   Result Value Ref Range    Sodium 137 135 - 145 MMOL/L    Potassium 4.0 3.5 - 5.1 MMOL/L    Chloride 99 99 - 110 mMol/L    CO2 27 21 - 32 MMOL/L    BUN 6 6 - 23 MG/DL    CREATININE 0.7 (L) 0.9 - 1.3 MG/DL    Glucose 116 (H) 70 - 99 MG/DL    Calcium 8.8 8.3 - 10.6 MG/DL    Albumin 3.2 (L) 3.4 - 5.0 GM/DL    Total Protein 6.7 6.4 - 8.2 GM/DL    Total Bilirubin 0.4 0.0 - 1.0 MG/DL    ALT 14 10 - 40 U/L    AST 13 (L) 15 - 37 IU/L    Alkaline Phosphatase 79 40 - 129 IU/L    GFR Non-African American >60 >60 mL/min/1.73m2    GFR African American >60 >60 mL/min/1.73m2    Anion Gap 11 4 - 16   POCT Glucose    Collection Time: 02/23/21  8:18 AM   Result Value Ref Range    POC Glucose 130 (H) 70 - 99 MG/DL   POCT Glucose    Collection Time: 02/23/21 12:21 PM   Result Value Ref Range    POC Glucose 166 (H) 70 - 99 MG/DL   Lactate dehydrogenase    Collection Time: 02/23/21  4:37 PM   Result Value Ref Range     120 - 246 IU/L   Hepatitis Panel, Acute    Collection Time: 02/23/21  4:37 PM   Result Value Ref Range    Hepatitis B Surface Ag NON REACTIVE NON REACTIVE    Hep A IgM NON REACTIVE NON REACTIVE    Hep B Core Ab, IgM NON REACTIVE NON REACTIVE    Hepatitis C Ab NON REACTIVE NON REACTIVE   Uric acid    Collection Time: 02/23/21  4:37 PM   Result Value Ref Range    Uric Acid 3.1 (L) 3.5 - 7.2 MG/DL   POCT Glucose    Collection Time: 02/23/21  5:25 PM   Result Value Ref Range    POC Glucose 99 70 - 99 MG/DL   POCT Glucose    Collection Time: 02/23/21  9:23 PM   Result Value Ref Range    POC Glucose 156 (H) 70 - 99 MG/DL       Scheduled Meds:   enoxaparin  40 mg Subcutaneous Daily    nicotine  1 patch Transdermal Daily    polyethylene glycol  17 g Oral Daily    magnesium hydroxide  30 mL Oral Daily    sodium chloride flush  10 mL Intravenous 2 times per day    insulin lispro  0-12 Units Subcutaneous 4x Daily WC       Continuous Infusions:   dextrose         Physical Exam:  HEENT: Anicteric sclerae, Oropharyngeal mucosae moist, pink and intact. Heart:  Normal S1 and S2, RRR  Lungs: Clear to auscultation bilaterally, No audible Wheezes or Rales. Extremities: No edema. Neuro: Alert and Oriented x 3, Non focal.  Abdomen: Soft, Benign, Diffusely tender, Non distended, Positive bowel sounds. Active Problems:    Mesenteric mass  Resolved Problems:    * No resolved hospital problems. *      Assessment and Plan:  Hi Chambers is a 23 y.o. male with Mesenteric masses, Path:  Final Pathologic Diagnosis:   Mesenteric mass, needle core biopsy:   -     HIGH GRADE B-CELL LYMPHOMA, see comment.       Electronically Signed Out By Tiffany Pollard MD   Comment: This lymphoma has morphologic and immunophenotypic features   suggestive of Burkitt lymphoma. FISH studies are pending.      Awaiting final Path, and oncology recommendations, to decide if he needs a port, BEFORE discharge or not.    ___________________________________________    Sigifredo Sarmiento MD, FACS, FICS  2/24/2021  7:40 AM

## 2021-02-24 NOTE — PROGRESS NOTES
Progress Note  Date:2021       [facility]:[id number]  Patient Elina Kidd     YOB: 1995     Age:25 y.o. Sister at bedside and discussed going to [facility] with aggressive lymphoma    Subjective    Subjective:  Symptoms:  Stable. Diet:  Adequate intake. Pain:  He complains of pain that is mild. Review of Systems  Objective         Vitals Last 24 Hours:  TEMPERATURE:  Temp  Av.6 °F (36.4 °C)  Min: 97.4 °F (36.3 °C)  Max: 97.9 °F (36.6 °C)  RESPIRATIONS RANGE: Resp  Avg: 15.8  Min: 15  Max: 16  PULSE OXIMETRY RANGE: SpO2  Av.4 %  Min: 95 %  Max: 99 %  PULSE RANGE: Pulse  Av.8  Min: 85  Max: 96  BLOOD PRESSURE RANGE: Systolic (73RZQ), OSZ:307 , Min:127 , CHL:546   ; Diastolic (09LTW), SBL:26, Min:80, Max:86    I/O (24Hr): No intake or output data in the 24 hours ending 21 0737  Objective:  General Appearance:  Comfortable. Vital signs: (most recent): Blood pressure 130/80, pulse 85, temperature 97.6 °F (36.4 °C), temperature source Oral, resp. rate 15, height 6' 4\" (1.93 m), weight 200 lb (90.7 kg), SpO2 95 %. Vital signs are normal.    HEENT: Normal HEENT exam.    Lungs:  Normal effort. Heart: Normal rate. Abdomen: Abdomen is soft. Extremities: Decreased range of motion. Neurological: Patient is alert. Pupils:  Pupils are equal, round, and reactive to light. Skin:  Warm. Labs/Imaging/Diagnostics    Labs:  CBC:  Recent Labs     21  0621 21  0800   WBC 10.7* 9.8   RBC 4.73 4.85   HGB 12.6* 12.8*   HCT 39.3* 39.8*   MCV 83.1 82.1   RDW 12.2 12.2    368     CHEMISTRIES:  Recent Labs     21  0621 21  0800    137   K 4.2 4.0    99   CO2 28 27   BUN 5* 6   CREATININE 0.8* 0.7*   GLUCOSE 118* 116*     PT/INR:No results for input(s): PROTIME, INR in the last 72 hours. APTT:No results for input(s): APTT in the last 72 hours.   LIVER PROFILE:  Recent Labs     21  0621 21  0800   AST 13* 13*   ALT 13 14   BILITOT 0.4 0.4   ALKPHOS 73 79       Imaging Last 24 Hours:  No results found. 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      CAlled [facility] and accepted and awaiting bed  Electronically signed by Dixon Sinclair MD on 2/23/21 at 7:39 AM EST

## 2021-02-24 NOTE — PROGRESS NOTES
CREATININE 0.8 (L) 0.9 - 1.3 MG/DL    Glucose 118 (H) 70 - 99 MG/DL    Calcium 8.9 8.3 - 10.6 MG/DL    Albumin 3.0 (L) 3.4 - 5.0 GM/DL    Total Protein 6.5 6.4 - 8.2 GM/DL    Total Bilirubin 0.4 0.0 - 1.0 MG/DL    ALT 13 10 - 40 U/L    AST 13 (L) 15 - 37 IU/L    Alkaline Phosphatase 73 40 - 129 IU/L    GFR Non-African American >60 >60 mL/min/1.73m2    GFR African American >60 >60 mL/min/1.73m2    Anion Gap 10 4 - 16   POCT Glucose    Collection Time: 02/22/21  7:58 AM   Result Value Ref Range    POC Glucose 114 (H) 70 - 99 MG/DL   POCT Glucose    Collection Time: 02/22/21 12:36 PM   Result Value Ref Range    POC Glucose 109 (H) 70 - 99 MG/DL   POCT Glucose    Collection Time: 02/22/21  4:46 PM   Result Value Ref Range    POC Glucose 114 (H) 70 - 99 MG/DL   POCT Glucose    Collection Time: 02/22/21  9:30 PM   Result Value Ref Range    POC Glucose 143 (H) 70 - 99 MG/DL   CBC auto differential    Collection Time: 02/23/21  8:00 AM   Result Value Ref Range    WBC 9.8 4.0 - 10.5 K/CU MM    RBC 4.85 4.6 - 6.2 M/CU MM    Hemoglobin 12.8 (L) 13.5 - 18.0 GM/DL    Hematocrit 39.8 (L) 42 - 52 %    MCV 82.1 78 - 100 FL    MCH 26.4 (L) 27 - 31 PG    MCHC 32.2 32.0 - 36.0 %    RDW 12.2 11.7 - 14.9 %    Platelets 922 176 - 408 K/CU MM    MPV 9.2 7.5 - 11.1 FL    Differential Type AUTOMATED DIFFERENTIAL     Segs Relative 72.0 (H) 36 - 66 %    Lymphocytes % 17.6 (L) 24 - 44 %    Monocytes % 6.6 (H) 0 - 4 %    Eosinophils % 2.5 0 - 3 %    Basophils % 0.6 0 - 1 %    Segs Absolute 7.1 K/CU MM    Lymphocytes Absolute 1.7 K/CU MM    Monocytes Absolute 0.7 K/CU MM    Eosinophils Absolute 0.3 K/CU MM    Basophils Absolute 0.1 K/CU MM    Nucleated RBC % 0.0 %    Total Nucleated RBC 0.0 K/CU MM    Total Immature Neutrophil 0.07 K/CU MM    Immature Neutrophil % 0.7 (H) 0 - 0.43 %   Comprehensive Metabolic Panel w/ Reflex to MG    Collection Time: 02/23/21  8:00 AM   Result Value Ref Range    Sodium 137 135 - 145 MMOL/L    Potassium 4.0 3.5 - 5. 1 MMOL/L    Chloride 99 99 - 110 mMol/L    CO2 27 21 - 32 MMOL/L    BUN 6 6 - 23 MG/DL    CREATININE 0.7 (L) 0.9 - 1.3 MG/DL    Glucose 116 (H) 70 - 99 MG/DL    Calcium 8.8 8.3 - 10.6 MG/DL    Albumin 3.2 (L) 3.4 - 5.0 GM/DL    Total Protein 6.7 6.4 - 8.2 GM/DL    Total Bilirubin 0.4 0.0 - 1.0 MG/DL    ALT 14 10 - 40 U/L    AST 13 (L) 15 - 37 IU/L    Alkaline Phosphatase 79 40 - 129 IU/L    GFR Non-African American >60 >60 mL/min/1.73m2    GFR African American >60 >60 mL/min/1.73m2    Anion Gap 11 4 - 16   POCT Glucose    Collection Time: 02/23/21  8:18 AM   Result Value Ref Range    POC Glucose 130 (H) 70 - 99 MG/DL   POCT Glucose    Collection Time: 02/23/21 12:21 PM   Result Value Ref Range    POC Glucose 166 (H) 70 - 99 MG/DL   Lactate dehydrogenase    Collection Time: 02/23/21  4:37 PM   Result Value Ref Range     120 - 246 IU/L   Hepatitis Panel, Acute    Collection Time: 02/23/21  4:37 PM   Result Value Ref Range    Hepatitis B Surface Ag NON REACTIVE NON REACTIVE    Hep A IgM NON REACTIVE NON REACTIVE    Hep B Core Ab, IgM NON REACTIVE NON REACTIVE    Hepatitis C Ab NON REACTIVE NON REACTIVE   Uric acid    Collection Time: 02/23/21  4:37 PM   Result Value Ref Range    Uric Acid 3.1 (L) 3.5 - 7.2 MG/DL   POCT Glucose    Collection Time: 02/23/21  5:25 PM   Result Value Ref Range    POC Glucose 99 70 - 99 MG/DL       Scheduled Meds:   nicotine  1 patch Transdermal Daily    polyethylene glycol  17 g Oral Daily    magnesium hydroxide  30 mL Oral Daily    piperacillin-tazobactam  3,375 mg Intravenous Q8H    sodium chloride flush  10 mL Intravenous 2 times per day    insulin lispro  0-12 Units Subcutaneous 4x Daily WC       Continuous Infusions:   dextrose         Physical Exam:  HEENT: Anicteric sclerae, Oropharyngeal mucosae moist, pink and intact. Heart:  Normal S1 and S2, RRR  Lungs: Clear to auscultation bilaterally, No audible Wheezes or Rales. Extremities: No edema.   Neuro: Alert and Oriented x 3, Non focal.  Abdomen: Soft, Benign, Diffusely tender, Non distended, Positive bowel sounds. Active Problems:    Mesenteric mass  Resolved Problems:    * No resolved hospital problems. *      Assessment and Plan:  Carlton Orozco is a 23 y.o. male with Mesenteric masses, Path:  Final Pathologic Diagnosis:   Mesenteric mass, needle core biopsy:   -     HIGH GRADE B-CELL LYMPHOMA, see comment.       Electronically Signed Out By Ramu Cool MD   Comment: This lymphoma has morphologic and immunophenotypic features   suggestive of Burkitt lymphoma. FISH studies are pending. Awaiting final Path, and oncology recommendations.     ___________________________________________    Neela Lamb MD, FACS, FICS  2/23/2021  8:19 PM

## 2021-02-24 NOTE — PROGRESS NOTES
Progress Note( Dr. Quoc Esparza)    Subjective:   Admit Date: 2/17/2021  PCP: No primary care provider on file. Admitted For : Abdominal pain and weight loss    Consulted For: Better control of blood glucose    Interval History:  for biopsy of mesenteric mass    Final Pathologic Diagnosis:   Mesenteric mass, needle core biopsy:   -     HIGH GRADE B-CELL LYMPHOMA,    Denies any chest pains,   Denies SOB . Denies nausea or vomiting. No new bowel or bladder symptoms. No intake or output data in the 24 hours ending 02/24/21 0802    DATA    CBC:   Recent Labs     02/22/21  0621 02/23/21  0800 02/24/21  0632   WBC 10.7* 9.8 11.8*   HGB 12.6* 12.8* 13.7    368 374    CMP:  Recent Labs     02/22/21  0621 02/23/21  0800    137   K 4.2 4.0    99   CO2 28 27   BUN 5* 6   CREATININE 0.8* 0.7*   CALCIUM 8.9 8.8   PROT 6.5 6.7   LABALBU 3.0* 3.2*   BILITOT 0.4 0.4   ALKPHOS 73 79   AST 13* 13*   ALT 13 14     Lipids:   Lab Results   Component Value Date    HDL 29 11/07/2020     Glucose:  Recent Labs     02/23/21  1221 02/23/21  1725 02/23/21  2123   POCGLU 166* 99 156*     [id number]:  Lab Results   Component Value Date    LABA1C 7.3 02/18/2021     High Sensitivity TSH: No results found for: TSHHS  Free T3: No results found for: FT3  Free T4:No results found for: T4FREE    Ct Guided Needle Placement    Result Date: 2/19/2021  PROCEDURE: CT GUIDED NDL PLACEMENT for mesentery mass biopsy. MODERATE CONSCIOUS SEDATION 2/19/2021 HISTORY: ORDERING SYSTEM PROVIDED HISTORY: mesenteric mass, mesenteric    The 17-gauge introducer needle was inserted using imaging guidance. The 18-gauge needle was then inserted in coaxial fashion and a biopsy from the mass was obtained. This was repeated for a total of 7 pieces of core biopsy samples. Core biopsy tissue specimens were submitted for histopathology assessment. All needles were removed and a sterile bandage applied. SPECIMENS SENT: For pathology evaluation. COMPLICATIONS: Post-procedure images demonstrate no evidence of immediate massive bleeding. The patient tolerated the procedure well with no immediate complications. [facility] FINDINGS: Successful CT guided mesentery mass core needle biopsy as described above. Successful CT guided mesentery mass biopsy. Ct Abdomen Pelvis W Iv Contrast Additional Contrast? None    Result Date: 2/18/2021  EXAMINATION: CT OF THE ABDOMEN AND PELVIS WITH CONTRAST 2/17/2021 7:10 pm   1. Abnormal wall thickening involving the gastric antrum, duodenal C-loop, and proximal jejunum, which may be inflammatory or neoplastic in etiology. 2. Approximate 4.3 x 3.7 cm heterogeneous \"mass\", within the mesentery, with surrounding inflammation. Differential considerations would include a necrotic neoplasm, necrotic adenopathy related to lymphoma or metastatic disease, or an infectious process. An additional 2.2 x 2.1 cm mass with central low attenuation is present more caudally within the mesentery. No air bubbles are present within these collections to suggest abscesses. Multiple additional mesenteric lymph nodes are present. 3. 1.8 cm cystic lesion within the tail the pancreas, and may represent sequela of prior pancreatitis. 4. Moderate amount of free fluid present within the pelvis 5.  Surgical consultation recommended       Scheduled Medicines   Medications:    enoxaparin  40 mg Subcutaneous Daily    nicotine  1 patch Transdermal Daily    polyethylene glycol  17 g Oral Daily    magnesium hydroxide  30 mL Oral Daily    sodium chloride flush  10 mL Intravenous 2 times per day    insulin lispro  0-12 Units Subcutaneous 4x Daily WC      Infusions:    dextrose           Objective:   Vitals: /80   Pulse 85   Temp 97.6 °F (36.4 °C) (Oral)   Resp 15   Ht 6' 4\" (1.93 m)   Wt 200 lb (90.7 kg)   SpO2 95%   BMI 24.34 kg/m²   General appearance: alert and cooperative with exam  Neck: no JVD or bruit  Thyroid : Normal lobes   Lungs: Has Vesicular Breath sounds   Heart:  regular rate and rhythm  Abdomen: soft, non-tender; bowel sounds normal; no masses,  no organomegaly  Musculoskeletal: Normal  Extremities: extremities normal, , no edema  Neurologic:  Awake, alert, oriented to name, place and time. Cranial nerves II-XII are grossly intact. Motor is  intact. Sensory is intact. ,  and gait is normal.    Assessment:     Patient Active Problem List:     Mesenteric mass     Diabetes mellitus     Weight loss    Final Pathologic Diagnosis:   Mesenteric mass, needle core biopsy:   -     HIGH GRADE B-CELL LYMPHOMA,      Plan:     1. Reviewed POC blood glucose . Labs and X ray results   2. Reviewed Current Medicines   3. On Correction bolus Humalog/ Basal Lantus Insulin regime  4. Monitor Blood glucose frequently  5. Modified  the dose of Insulin/ other medicines as needed   6. And add advanced almost normal diet  7. .  Waiting for oncology  8. Will follow     .      Veronica Vigil MD

## 2021-02-24 NOTE — CONSULTS
Hematology/Oncology  Consult Note      Reason for Consult: High-grade non-Hodgkin lymphoma  Requesting Physician: Hospitalist    CHIEF COMPLAINT: Abdominal pain associated with night sweats and weight loss. History Obtained From:  patient, electronic medical record    HISTORY OF PRESENT ILLNESS:      The patient is a 23 y.o. male with significant past medical history of DM who presents with abdominal pain associated with night sweats and weight loss in the last 3 to 4 months. He lost about 50 pounds in the last 5 months. 1 aunt has non-Hodgkin lymphoma. He does not have any children. CT abdomen and pelvis on February 18, 2021 showed  1. Abnormal wall thickening involving the gastric antrum, duodenal C-loop,   and proximal jejunum, which may be inflammatory or neoplastic in etiology. 2. Approximate 4.3 x 3.7 cm heterogeneous \"mass\", within the mesentery, with   surrounding inflammation.  Differential considerations would include a   necrotic neoplasm, necrotic adenopathy related to lymphoma or metastatic   disease, or an infectious process.  An additional 2.2 x 2.1 cm mass with   central low attenuation is present more caudally within the mesentery.  No   air bubbles are present within these collections to suggest abscesses. Multiple additional mesenteric lymph nodes are present. 3. 1.8 cm cystic lesion within the tail the pancreas, and may represent   sequela of prior pancreatitis. 4. Moderate amount of free fluid present within the pelvis   5. Surgical consultation recommended     On February 19, 2021 he under went CT-guided needle biopsy of mesenteric mass. Culture report showed high-grade B-cell lymphoma. FISH studies are pending. Immunophenotypic features suggestive of Burkitt lymphoma. I discussed the case with our pathologist who is almost certain that the patient has Burkitt lymphoma rather than double hit lymphoma. Acute viral hepatitis serology was negative. LDH was 201.   Uric acid today.  The remainder of the review of system is unremarkable. PHYSICAL EXAM:      Vitals:    /80   Pulse 85   Temp 97.6 °F (36.4 °C) (Oral)   Resp 15   Ht 6' 4\" (1.93 m)   Wt 200 lb (90.7 kg)   SpO2 95%   BMI 24.34 kg/m²     CONSTITUTIONAL:  awake, alert, cooperative and no apparent distress  EYES:  extra-ocular muscles intact and sclera clear  NECK: He has palpable right axillary lymphadenopathy.  Supple, symmetrical, trachea midline  LUNGS:  clear to auscultation, no crackles or wheezing  CARDIOVASCULAR:  normal S1 and S2 and no murmur noted  ABDOMEN:  normal bowel sounds, soft, non-distended and non-tender  MUSCULOSKELETAL:  there is no redness, warmth, or swelling of the joints  full range of motion noted  NEUROLOGIC:  Cranial Nerves:  cranial nerves II-XII are grossly intact  SKIN:  no rashes and no jaundice  DATA:    Labs:  General Labs:    CBC with Differential:    Lab Results   Component Value Date    WBC 11.8 02/24/2021    RBC 5.16 02/24/2021    HGB 13.7 02/24/2021    HCT 43.4 02/24/2021     02/24/2021    MCV 84.1 02/24/2021    MCH 26.6 02/24/2021    MCHC 31.6 02/24/2021    RDW 12.3 02/24/2021    SEGSPCT 70.4 02/24/2021    BANDSPCT 3 01/08/2014    LYMPHOPCT 16.9 02/24/2021    MONOPCT 7.4 02/24/2021    BASOPCT 0.5 02/24/2021    MONOSABS 0.9 02/24/2021    LYMPHSABS 2.0 02/24/2021    EOSABS 0.5 02/24/2021    BASOSABS 0.1 02/24/2021    DIFFTYPE AUTOMATED DIFFERENTIAL 02/24/2021     CMP:    Lab Results   Component Value Date     02/24/2021    K 4.4 02/24/2021    CL 98 02/24/2021    CO2 31 02/24/2021    BUN 7 02/24/2021    CREATININE 0.8 02/24/2021    GFRAA >60 02/24/2021    LABGLOM >60 02/24/2021    GLUCOSE 122 02/24/2021    PROT 7.1 02/24/2021    LABALBU 3.3 02/24/2021    CALCIUM 9.3 02/24/2021    BILITOT 0.3 02/24/2021    ALKPHOS 82 02/24/2021    AST 13 02/24/2021    ALT 17 02/24/2021     HIV studies pending    IMPRESSION/RECOMMENDATIONS:      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 I recommend to transfer him to [facility] for treatment. He may need treatment either with CODOX or hyper CVAD regimen. I will defer bone marrow study and LP since he will be transferred. Clinically at least he has stage IIIb. Thank you for allowing me to participate in the care of this nice male patient.

## 2021-02-24 NOTE — ANESTHESIA POSTPROCEDURE EVALUATION
Department of Anesthesiology  Postprocedure Note    Patient: Syl Smith  MRN: [id number]  YOB: 1995  Date of evaluation: 2/24/2021  Time:  5:46 PM     Procedure Summary     Date: 02/24/21 Room / Location: [address] [place] [address]    Anesthesia Start: 3595 Anesthesia Stop: 6444    Procedure: PORT INSERTION (N/A ) Diagnosis: (mesenteric mass)    Surgeons: Suraj Hahn MD Responsible Provider: Jasmeet Benavides DO    Anesthesia Type: MAC ASA Status: 3          Anesthesia Type: MAC    Aristeo Phase I:      Aristeo Phase II:      Last vitals: Reviewed and per EMR flowsheets.        Anesthesia Post Evaluation    Patient location during evaluation: PACU  Patient participation: complete - patient participated  Level of consciousness: sleepy but conscious  Airway patency: patent  Nausea & Vomiting: no nausea  Complications: no  Cardiovascular status: hemodynamically stable  Respiratory status: acceptable  Hydration status: euvolemic

## 2021-02-24 NOTE — OP NOTE
[facility]    Sunday Poala, 1995, 23 y.o.,  male, CSN: [id number]  February 24, 2021    PRE-OP DIAGNOSIS: Burkitt lymphoma, clinically stage IIIB       POST-OP DIAGNOSIS: Same    PROCEDURE: Placement of an 6.6-Romanian, low profile Mediport via left  subclavian vein approach, using the micropuncture kit first, Under Ultrasound and Direct Fluoroscopy. SURGEON(S):   Bhavna Denton M.D., F.A.C.S., F.I.C.S. ASSISTANT(S):  NONE    ANESTHESIA: MAC + Local 1% Xylocaine with Epinephrine, 0.5% Marcaine, mixed, 50% : 50%    SPECIMENS: NONE.    ESTIMATED BLOOD LOSS:  Less then 5 ml           DRAIN: NONE    COMPLICATIONS: NONE           CONDITION / DISPOSITION:  Stable, to PACU     OPERATIVE DESCRIPTION:      After proper informed consent was signed by the  patient with all the risks, benefits, potential complications, and possible  alternatives of the procedure, including but not limited to bleeding,  infection, pneumothorax requiring chest tube placement, port  clotting/clogging requiring port revision with new port placement, amongst  others, the patient was properly identified. In the holding area, he received  2 grams of Ancef IV. TEDs and SCDs were placed on his legs and activated  bilaterally. he voided his urinary bladder prior to OR, and Hibiclens skin  prep was performed of the upper chest and lower neck bilaterally. he was  taken to the operating room and was placed supine on the operating room  table, and after institution of general IV sedation, a shoulder bump was  used. A folded sheet was placed under his scapulae bilaterally. We exposed  the clavicle as anterior as possible. The patient was placed in  Trendelenburg position. The bilateral upper chest and neck all the way to  the chin were prepped and draped in the usual sterile fashion also using  Ioban drape to prevent any contact of the port with bacterial skin amelia.  A  left SCV approach was started and first path with the micro puncture kit, the left subclavian vein was cannulated without any difficulty, under ultrasound guidance. Dark venous blood was aspirated  freely without any difficulty. The guidewire was then passed through the  trocar all the way until it reached the heart. The trocar was then removed  and using the Seldinger technique the guidewire was upgraded to the 0.035 of the Mediport kit; then furthermore local anesthesia was injected for the subcutaneous tunnel  and port site implantation site all the way to the pectoral fascia. A transverse  incision 3 fingerbreadths below the midclavicular entry point of the  guidewire was performed. Dissection was carried down all the way to the  right anterior pectoral fascia. The transverse incision was about 4 cm. Dissection was  carried down to create the pocket and then the port was secured to the  anterior pectoral fascia x3 using 2-0 Prolene. The dilator was then passed  over the guidewire after an 11 blade skin nick incision was performed at the  guidewire entry site at the inferior and midclavicular point. Over the  guidewire, the dilator was advanced without any difficulty, and then the  dilator and sheath were passed over the guidewire, using the Seldinger technique. The guidewire and dilator  were then removed, and the catheter was fed through the sheath all the way until  it reached the right side of the heart. Under direct fluoroscopic visualization, the tip of  the catheter was pulled back gradually until it reached the junction between  the superior vena cava and the right atrium. The tunneler was used to pass  the catheter subcutaneously from the inferior midclavicular entry site to the  port pocket site. It was divided and connected with the port. The securer was pushed  to cover the line and the port to the hub and then the port was accessed with  a Barrow needle and dark venous blood was aspirated freely without any  difficulty.  It was then flushed using 30 mL of normal saline followed by 3  mL of heparinized saline solution 100 units per mL. The deep  dermis/subcutaneous fat was reapproximated using 3-0 Vicryl and the skin of  this incision as well as of the skin nick incision at the mid inferior  clavicular point / access site were reapproximated using 4-0 Vicryl in a  running subcuticular fashion followed by Dermabond skin glue after further  more local anesthetic mixture was injected. A postoperative x-ray revealed adequate  placement of the Mediport without any complications. The patient tolerated  the procedure very well without any complications and was sent to the PACU in  stable condition.      ____________________________________________    Mari Loaiza MD, FACS, FICS    2/24/2021  4:49 PM

## 2021-02-24 NOTE — ANESTHESIA PRE PROCEDURE
Department of Anesthesiology  Preprocedure Note       Name:  Humera Gonzalez   Age:  23 y.o.  :  1995                                          MRN:  [id number]         Date:  2021      Surgeon: Clement Miller):  Samantha Block MD    Procedure: Procedure(s):  PORT INSERTION    Medications prior to admission:   Prior to Admission medications    Medication Sig Start Date End Date Taking?  Authorizing Provider   metFORMIN (GLUCOPHAGE-XR) 500 MG extended release tablet  20   Historical Provider, MD   naproxen (NAPROSYN) 500 MG tablet Take 1 tablet by mouth 2 times daily 19   Kanchan Larios PA-C   acetaminophen (APAP EXTRA STRENGTH) 500 MG tablet Take 1 tablet by mouth every 6 hours as needed for Pain 18   ESTER Arriaza       Current medications:    Current Facility-Administered Medications   Medication Dose Route Frequency Provider Last Rate Last Admin    enoxaparin (LOVENOX) injection 40 mg  40 mg Subcutaneous Daily Temi Liu MD        nicotine (NICODERM CQ) 21 MG/24HR 1 patch  1 patch Transdermal Daily Carter Lanza MD   1 patch at 21 0944    polyethylene glycol (GLYCOLAX) packet 17 g  17 g Oral Daily Samantha Block MD   17 g at 21 0944    magnesium hydroxide (MILK OF MAGNESIA) 400 MG/5ML suspension 30 mL  30 mL Oral Daily Carter Lanza MD   Stopped at 21 0908    sodium chloride flush 0.9 % injection 10 mL  10 mL Intravenous 2 times per day Benjamín Rodriguez MD   10 mL at 21 0944    sodium chloride flush 0.9 % injection 10 mL  10 mL Intravenous PRN Benjamín Rodrgiuez MD        promethazine (PHENERGAN) tablet 12.5 mg  12.5 mg Oral Q6H PRN Benjamín Rodriguez MD        Or    ondansetron [facility]) injection 4 mg  4 mg Intravenous Q6H PRN Benjamín Rodriguez MD   4 mg at 21 0333    acetaminophen (TYLENOL) tablet 650 mg  650 mg Oral Q6H PRN Benjamín Rodriguez MD   650 mg at 21 0019    Or    acetaminophen (TYLENOL) suppository 650 mg  650 mg Rectal Q6H PRN Patrick Valero MD        insulin lispro (HUMALOG) injection vial 0-12 Units  0-12 Units Subcutaneous 4x Daily WC Patrick Valero MD   Stopped at 02/23/21 2149    glucose (GLUTOSE) 40 % oral gel 15 g  15 g Oral PRN Patrick Valero MD        dextrose 50 % IV solution  12.5 g Intravenous PRN Patrick Valero MD        glucagon (rDNA) injection 1 mg  1 mg Intramuscular PRN Patrick Valero MD        dextrose 5 % solution  100 mL/hr Intravenous PRN Patrick Valero MD           Allergies:  No Known Allergies    Problem List:    Patient Active Problem List   Diagnosis Code    Mesenteric mass K63.89       Past Medical History:        Diagnosis Date    Asthma     Diabetes mellitus ([facility] [address]        Past Surgical History:  History reviewed. No pertinent surgical history. Social History:    Social History     Tobacco Use    Smoking status: Current Every Day Smoker     Packs/day: 1.00     Years: 2.00     Pack years: 2.00     Types: Cigarettes    Smokeless tobacco: Never Used   Substance Use Topics    Alcohol use:  No                                Ready to quit: Not Answered  Counseling given: Not Answered      Vital Signs (Current):   Vitals:    02/23/21 1516 02/23/21 2145 02/24/21 0600 02/24/21 0840   BP: 134/86 128/82 130/80 (!) 126/90   Pulse: 96 90 85 90   Resp: 16 16 15 16   Temp: 36.3 °C (97.4 °F) 36.6 °C (97.9 °F) 36.4 °C (97.6 °F) 36.6 °C (97.8 °F)   TempSrc: Oral Oral Oral Oral   SpO2: 96% 99% 95%    Weight:       Height:                                                  BP Readings from Last 3 Encounters:   02/24/21 (!) 126/90   12/27/20 139/74   11/06/20 132/66       NPO Status: Time of last liquid consumption: 0000                        Time of last solid consumption: 0000                        Date of last liquid consumption: 02/18/21                        Date of last solid food consumption: 02/18/21    BMI:   Wt Readings from Last 3 Encounters: 02/17/21 200 lb (90.7 kg)   12/26/20 200 lb (90.7 kg)   11/05/20 235 lb (106.6 kg)     Body mass index is 24.34 kg/m².     CBC:   Lab Results   Component Value Date    WBC 11.8 02/24/2021    RBC 5.16 02/24/2021    HGB 13.7 02/24/2021    HCT 43.4 02/24/2021    MCV 84.1 02/24/2021    RDW 12.3 02/24/2021     02/24/2021       CMP:   Lab Results   Component Value Date     02/24/2021    K 4.4 02/24/2021    CL 98 02/24/2021    CO2 31 02/24/2021    BUN 7 02/24/2021    CREATININE 0.8 02/24/2021    GFRAA >60 02/24/2021    LABGLOM >60 02/24/2021    GLUCOSE 122 02/24/2021    PROT 7.1 02/24/2021    CALCIUM 9.3 02/24/2021    BILITOT 0.3 02/24/2021    ALKPHOS 82 02/24/2021    AST 13 02/24/2021    ALT 17 02/24/2021       POC Tests:   Recent Labs     02/24/21  1127   POCGLU 136*       Coags:   Lab Results   Component Value Date    PROTIME 14.8 02/18/2021    INR 1.22 02/18/2021       HCG (If Applicable): No results found for: PREGTESTUR, PREGSERUM, HCG, HCGQUANT     ABGs: No results found for: PHART, PO2ART, IFX6JBG, ONX4PFH, BEART, F0IMSDBW     Type & Screen (If Applicable):  No results found for: LABABO, LABRH    Drug/Infectious Status (If Applicable):  Lab Results   Component Value Date    HEPCAB NON REACTIVE 02/23/2021       COVID-19 Screening (If Applicable):   Lab Results   Component Value Date    COVID19 NOT DETECTED 02/18/2021         Anesthesia Evaluation  Patient summary reviewed no history of anesthetic complications:   Airway: Mallampati: I  TM distance: >3 FB   Neck ROM: full  Mouth opening: > = 3 FB Dental: normal exam         Pulmonary: breath sounds clear to auscultation  (+) asthma: current smoker                           Cardiovascular:  Exercise tolerance: good (>4 METS),         ECG reviewed  Rhythm: regular  Rate: normal  Echocardiogram reviewed               ROS comment: Sinus tachycardia   Nonspecific T wave abnormality   Abnormal ECG   When compared with ECG of 05-NOV-2020 16:13,   ST no longer elevated in Inferior leads   Nonspecific T wave abnormality now evident in Inferior leads   Nonspecific T wave abnormality, worse in Lateral leads   Confirmed by Rolly Apley ([id number]) on 2/21/2021 5:09:25 PM      Neuro/Psych:   Negative Neuro/Psych ROS              GI/Hepatic/Renal: Neg GI/Hepatic/Renal ROS            Endo/Other:    (+) DiabetesType II DM, , malignancy/cancer (stage 3b Burkitt's lymphoma). Pt had no PAT visit       Abdominal:           Vascular:                                  Anesthesia Plan      MAC     ASA 3       Induction: intravenous. MIPS: Postoperative opioids intended and Prophylactic antiemetics administered. Plan discussed with NAOMI.               Tera Danielson, APRN - CRNA   2/24/2021

## 2021-02-24 NOTE — PLAN OF CARE
Problem: Pain:  Goal: Pain level will decrease  Description: Pain level will decrease  2/24/2021 1057 by Satinder Sarkar RN  Outcome: Ongoing  2/24/2021 1057 by Satinder Sarkar RN  Outcome: Ongoing  Goal: Control of acute pain  Description: Control of acute pain  2/24/2021 1057 by Satinder Sarkar RN  Outcome: Ongoing  2/24/2021 1057 by Satinder Sarkar RN  Outcome: Ongoing  Goal: Control of chronic pain  Description: Control of chronic pain  2/24/2021 1057 by Satinder Sarkar RN  Outcome: Ongoing  2/24/2021 1057 by Satinder Sarkar RN  Outcome: Ongoing

## 2021-02-25 VITALS
BODY MASS INDEX: 24.36 KG/M2 | HEIGHT: 76 IN | DIASTOLIC BLOOD PRESSURE: 78 MMHG | SYSTOLIC BLOOD PRESSURE: 119 MMHG | OXYGEN SATURATION: 96 % | HEART RATE: 85 BPM | TEMPERATURE: 98 F | RESPIRATION RATE: 16 BRPM | WEIGHT: 200 LBS

## 2021-02-25 LAB
ALBUMIN SERPL-MCNC: 3.5 GM/DL (ref 3.4–5)
ALP BLD-CCNC: 87 IU/L (ref 40–129)
ALT SERPL-CCNC: 18 U/L (ref 10–40)
ANION GAP SERPL CALCULATED.3IONS-SCNC: 10 MMOL/L (ref 4–16)
AST SERPL-CCNC: 17 IU/L (ref 15–37)
BASOPHILS ABSOLUTE: 0.1 K/CU MM
BASOPHILS RELATIVE PERCENT: 0.4 % (ref 0–1)
BILIRUB SERPL-MCNC: 0.4 MG/DL (ref 0–1)
BUN BLDV-MCNC: 8 MG/DL (ref 6–23)
CALCIUM SERPL-MCNC: 9.4 MG/DL (ref 8.3–10.6)
CHLORIDE BLD-SCNC: 94 MMOL/L (ref 99–110)
CO2: 30 MMOL/L (ref 21–32)
CREAT SERPL-MCNC: 0.8 MG/DL (ref 0.9–1.3)
CULTURE: NORMAL
CULTURE: NORMAL
DIFFERENTIAL TYPE: ABNORMAL
EOSINOPHILS ABSOLUTE: 0.4 K/CU MM
EOSINOPHILS RELATIVE PERCENT: 2.8 % (ref 0–3)
GFR AFRICAN AMERICAN: >60 ML/MIN/1.73M2
GFR NON-AFRICAN AMERICAN: >60 ML/MIN/1.73M2
GLUCOSE BLD-MCNC: 127 MG/DL (ref 70–99)
GLUCOSE BLD-MCNC: 133 MG/DL (ref 70–99)
GLUCOSE BLD-MCNC: 184 MG/DL (ref 70–99)
HCT VFR BLD CALC: 44.9 % (ref 42–52)
HEMOGLOBIN: 14.2 GM/DL (ref 13.5–18)
IMMATURE NEUTROPHIL %: 0.9 % (ref 0–0.43)
LYMPHOCYTES ABSOLUTE: 2.4 K/CU MM
LYMPHOCYTES RELATIVE PERCENT: 17.4 % (ref 24–44)
Lab: NORMAL
Lab: NORMAL
MCH RBC QN AUTO: 26.4 PG (ref 27–31)
MCHC RBC AUTO-ENTMCNC: 31.6 % (ref 32–36)
MCV RBC AUTO: 83.5 FL (ref 78–100)
MONOCYTES ABSOLUTE: 0.8 K/CU MM
MONOCYTES RELATIVE PERCENT: 5.9 % (ref 0–4)
NUCLEATED RBC %: 0 %
PDW BLD-RTO: 12.4 % (ref 11.7–14.9)
PLATELET # BLD: 421 K/CU MM (ref 140–440)
PMV BLD AUTO: 9.1 FL (ref 7.5–11.1)
POTASSIUM SERPL-SCNC: 4.6 MMOL/L (ref 3.5–5.1)
RBC # BLD: 5.38 M/CU MM (ref 4.6–6.2)
SEGMENTED NEUTROPHILS ABSOLUTE COUNT: 10 K/CU MM
SEGMENTED NEUTROPHILS RELATIVE PERCENT: 72.6 % (ref 36–66)
SODIUM BLD-SCNC: 134 MMOL/L (ref 135–145)
SPECIMEN: NORMAL
SPECIMEN: NORMAL
TOTAL IMMATURE NEUTOROPHIL: 0.12 K/CU MM
TOTAL NUCLEATED RBC: 0 K/CU MM
TOTAL PROTEIN: 7.7 GM/DL (ref 6.4–8.2)
WBC # BLD: 13.8 K/CU MM (ref 4–10.5)

## 2021-02-25 PROCEDURE — 36415 COLL VENOUS BLD VENIPUNCTURE: CPT

## 2021-02-25 PROCEDURE — 85025 COMPLETE CBC W/AUTO DIFF WBC: CPT

## 2021-02-25 PROCEDURE — 2580000003 HC RX 258: Performed by: SURGERY

## 2021-02-25 PROCEDURE — 6370000000 HC RX 637 (ALT 250 FOR IP): Performed by: SURGERY

## 2021-02-25 PROCEDURE — 80053 COMPREHEN METABOLIC PANEL: CPT

## 2021-02-25 PROCEDURE — 99024 POSTOP FOLLOW-UP VISIT: CPT | Performed by: SURGERY

## 2021-02-25 PROCEDURE — 82962 GLUCOSE BLOOD TEST: CPT

## 2021-02-25 RX ADMIN — OXYCODONE HYDROCHLORIDE AND ACETAMINOPHEN 2 TABLET: 5; 325 TABLET ORAL at 08:43

## 2021-02-25 RX ADMIN — SODIUM CHLORIDE, PRESERVATIVE FREE 10 ML: 5 INJECTION INTRAVENOUS at 08:43

## 2021-02-25 RX ADMIN — INSULIN LISPRO 2 UNITS: 100 INJECTION, SOLUTION INTRAVENOUS; SUBCUTANEOUS at 12:33

## 2021-02-25 RX ADMIN — POLYETHYLENE GLYCOL (3350) 17 G: 17 POWDER, FOR SOLUTION ORAL at 08:43

## 2021-02-25 ASSESSMENT — PAIN SCALES - GENERAL: PAINLEVEL_OUTOF10: 6

## 2021-02-25 ASSESSMENT — PAIN DESCRIPTION - LOCATION: LOCATION: ABDOMEN

## 2021-02-25 ASSESSMENT — PAIN DESCRIPTION - PAIN TYPE: TYPE: CHRONIC PAIN

## 2021-02-25 NOTE — DISCHARGE INSTR - COC
Continuity of Care Form    Patient Name: Junius Hammans   :  1995  MRN:  [id number]    Admit date:  2021  Discharge date:  21    Code Status Order: Full Code   Advance Directives:   [facility] Documentation     Date/Time Healthcare Directive Type of Healthcare Directive Copy in [facility] [address] Agent's Name Healthcare Agent's Phone Number    21 1646  No, patient does not have an advance directive for healthcare treatment -- -- -- -- --          Admitting Physician:  Dionna Valero MD  PCP: No primary care provider on file. Discharging Nurse: s young [address] Unit/Room#: 4108/4108-A  Discharging Unit Phone Number: [phone]    Emergency Contact:   Extended Emergency Contact Information  Primary Emergency Contact: Rob Holland  Address: [address] [place] [address] Phone: [phone]  Mobile Phone: [phone]  Relation: Parent    Past Surgical History:  Past Surgical History:   Procedure Laterality Date    PORT SURGERY N/A 2021    PORT INSERTION performed by Eric Figueroa MD at [facility] OR       Immunization History: There is no immunization history on file for this patient.     Active Problems:  Patient Active Problem List   Diagnosis Code    Mesenteric mass K63.89       Isolation/Infection:   Isolation          No Isolation        Patient Infection Status     None to display          Nurse Assessment:  Last Vital Signs: /78   Pulse 85   Temp 98 °F (36.7 °C) (Oral)   Resp 16   Ht 6' 4\" (1.93 m)   Wt 200 lb (90.7 kg)   SpO2 96%   BMI 24.34 kg/m²     Last documented pain score (0-10 scale): Pain Level: 1  Last Weight:   Wt Readings from Last 1 Encounters:   21 200 lb (90.7 kg)     Mental Status:  oriented and alert    IV Access:  L wrist saline locked    Nursing Mobility/ADLs:  Walking   Independent  Transfer  Independent  Bathing Independent  Dressing  Independent  Toileting  Independent  Feeding  Independent  Med Admin  Independent  Med Delivery   whole    Wound Care Documentation and Therapy:        Elimination:  Continence:   · Bowel: Yes  · Bladder: Yes  Urinary Catheter: None   Colostomy/Ileostomy/Ileal Conduit: No       Date of Last BM: 2/24/21    Intake/Output Summary (Last 24 hours) at 2/25/2021 1246  Last data filed at 2/25/2021 0930  Gross per 24 hour   Intake 250 ml   Output --   Net 250 ml     No intake/output data recorded. Safety Concerns:     None    Impairments/Disabilities:      None    Nutrition Therapy:  Current Nutrition Therapy:   - Oral Diet:  General/ Carb control    Routes of Feeding: Oral  Liquids: No Restrictions  Daily Fluid Restriction: no  Last Modified Barium Swallow with Video (Video Swallowing Test): not done    Treatments at the Time of Hospital Discharge:   Respiratory Treatments: ***  Oxygen Therapy:  is not on home oxygen therapy.   Ventilator:    - No ventilator support    Rehab Therapies: ***  Weight Bearing Status/Restrictions: No weight bearing restirctions  Other Medical Equipment (for information only, NOT a DME order):  ***  Other Treatments: ***    Patient's personal belongings (please select all that are sent with patient):  None    RN SIGNATURE:  Electronically signed by America Yusuf RN on 2/25/21 at 12:50 PM EST    CASE MANAGEMENT/SOCIAL WORK SECTION    Inpatient Status Date: ***    Readmission Risk Assessment Score:  Readmission Risk              Risk of Unplanned Readmission:        8           Discharging to Facility/ Agency   · Name:   · Address:  · Phone:  · Fax:    Dialysis Facility (if applicable)   · Name:  · Address:  · Dialysis Schedule:  · Phone:  · Fax:    / signature: {Esignature:[id number]}    PHYSICIAN SECTION    Prognosis: {Prognosis:1160464039}    Condition at Discharge: [facility] Patient Condition:501480213}    Rehab Potential (if transferring to Rehab): {Prognosis:7783910266}    Recommended Labs or Other Treatments After Discharge: ***    Physician Certification: I certify the above information and transfer of Roberto Vidal  is necessary for the continuing treatment of the diagnosis listed and that he requires {Admit to Appropriate Level of Care:23069} for {GREATER/LESS:381686853} 30 days.      Update Admission H&P: {CHP DME Changes in CHWDY:454122724}    PHYSICIAN SIGNATURE:  {Esignature:[id number]}

## 2021-02-25 NOTE — CARE COORDINATION
Reviewed chart and spoke with Dr Natasha Woods, plan is to [facility] when they have a bed for pt. [facility] will contacting nursing when bed opens up.

## 2021-02-25 NOTE — PROGRESS NOTES
He has a Mediport placement yesterday. He is resting comfortably. Awaiting for transfer to the [facility]. He has likely high risk Burkitt lymphoma. Also recommend him to follow-up with me as an outpatient after his discharge from the [facility].   Thanks

## 2021-02-25 NOTE — PROGRESS NOTES
Progress Note( Dr. Jf Rousseau)    Subjective:   Admit Date: 2/17/2021  PCP: No primary care provider on file. Admitted For : Abdominal pain and weight loss    Consulted For: Better control of blood glucose    Interval History:  for biopsy of mesenteric mass    Final Pathologic Diagnosis:   Mesenteric mass, needle core biopsy:   -     HIGH GRADE B-CELL LYMPHOMA,    MediPort for possible chemotherapy later  Denies any chest pains,   Denies SOB . Denies nausea or vomiting. No new bowel or bladder symptoms. No intake or output data in the 24 hours ending 02/25/21 0738    DATA    CBC:   Recent Labs     02/23/21  0800 02/24/21  0632   WBC 9.8 11.8*   HGB 12.8* 13.7    374    CMP:  Recent Labs     02/23/21  0800 02/24/21  0632    136   K 4.0 4.4   CL 99 98*   CO2 27 31   BUN 6 7   CREATININE 0.7* 0.8*   CALCIUM 8.8 9.3   PROT 6.7 7.1   LABALBU 3.2* 3.3*   BILITOT 0.4 0.3   ALKPHOS 79 82   AST 13* 13*   ALT 14 17     Lipids:   Lab Results   Component Value Date    HDL 29 11/07/2020     Glucose:  Recent Labs     02/24/21  1127 02/24/21  1721 02/24/21  2110   POCGLU 136* 126* 168*     [id number]:  Lab Results   Component Value Date    LABA1C 7.3 02/18/2021     High Sensitivity TSH: No results found for: TSHHS  Free T3: No results found for: FT3  Free T4:No results found for: T4FREE    Ct Guided Needle Placement    Result Date: 2/19/2021  PROCEDURE: CT GUIDED NDL PLACEMENT for mesentery mass biopsy. MODERATE CONSCIOUS SEDATION 2/19/2021 HISTORY: ORDERING SYSTEM PROVIDED HISTORY: mesenteric mass, mesenteric    The 17-gauge introducer needle was inserted using imaging guidance. The 18-gauge needle was then inserted in coaxial fashion and a biopsy from the mass was obtained. This was repeated for a total of 7 pieces of core biopsy samples. Core biopsy tissue specimens were submitted for histopathology assessment. All needles were removed and a sterile bandage applied.  SPECIMENS SENT: For pathology evaluation. COMPLICATIONS: Post-procedure images demonstrate no evidence of immediate massive bleeding. The patient tolerated the procedure well with no immediate complications. Stacey Epps FINDINGS: Successful CT guided mesentery mass core needle biopsy as described above. Successful CT guided mesentery mass biopsy. Ct Abdomen Pelvis W Iv Contrast Additional Contrast? None    Result Date: 2/18/2021  EXAMINATION: CT OF THE ABDOMEN AND PELVIS WITH CONTRAST 2/17/2021 7:10 pm   1. Abnormal wall thickening involving the gastric antrum, duodenal C-loop, and proximal jejunum, which may be inflammatory or neoplastic in etiology. 2. Approximate 4.3 x 3.7 cm heterogeneous \"mass\", within the mesentery, with surrounding inflammation. Differential considerations would include a necrotic neoplasm, necrotic adenopathy related to lymphoma or metastatic disease, or an infectious process. An additional 2.2 x 2.1 cm mass with central low attenuation is present more caudally within the mesentery. No air bubbles are present within these collections to suggest abscesses. Multiple additional mesenteric lymph nodes are present. 3. 1.8 cm cystic lesion within the tail the pancreas, and may represent sequela of prior pancreatitis. 4. Moderate amount of free fluid present within the pelvis 5.  Surgical consultation recommended       Scheduled Medicines   Medications:    enoxaparin  40 mg Subcutaneous Daily    nicotine  1 patch Transdermal Daily    polyethylene glycol  17 g Oral Daily    magnesium hydroxide  30 mL Oral Daily    sodium chloride flush  10 mL Intravenous 2 times per day    insulin lispro  0-12 Units Subcutaneous 4x Daily WC      Infusions:    lactated ringers 100 mL/hr at 02/24/21 1448    dextrose           Objective:   Vitals: /66   Pulse 85   Temp 98 °F (36.7 °C) (Oral)   Resp 17   Ht 6' 4\" (1.93 m)   Wt 200 lb (90.7 kg)   SpO2 96%   BMI 24.34 kg/m²   General appearance: alert and cooperative with exam  Neck: no JVD or bruit  Thyroid : Normal lobes   Lungs: Has Vesicular Breath sounds   Heart:  regular rate and rhythm  Abdomen: soft, non-tender; bowel sounds normal; no masses,  no organomegaly  Musculoskeletal: Normal  Extremities: extremities normal, , no edema  Neurologic:  Awake, alert, oriented to name, place and time. Cranial nerves II-XII are grossly intact. Motor is  intact. Sensory is intact. ,  and gait is normal.    Assessment:     Patient Active Problem List:     Mesenteric mass     Diabetes mellitus     Weight loss    Final Pathologic Diagnosis:   Mesenteric mass, needle core biopsy:   -     HIGH GRADE B-CELL LYMPHOMA,      Plan:     1. Reviewed POC blood glucose . Labs and X ray results   2. Reviewed Current Medicines   3. On Correction bolus Humalog/ Basal Lantus Insulin regime  4. Monitor Blood glucose frequently  5. Modified  the dose of Insulin/ other medicines as needed   6. And add advanced almost normal diet  7. Reviewed notes of oncology. Advised to transfer patient to [facility] for better therapy  8. Waiting for a bed at [facility]    .      Jhonatan Tsang MD

## 2021-02-25 NOTE — DISCHARGE SUMMARY
Physician Discharge Summary     Patient ID:  Carlton Orozco  [id number]  78 y.o.  1995    Admit date: 2/17/2021    Discharge date and time: 2/25/2021  1:32 PM     Admitting Physician: Zhao Velez MD     Discharge Physician: Casa Saldaña    Admission Diagnoses: Mesenteric mass [K63.89]  Right lower quadrant abdominal pain [R10.31]  Abdominal mass, unspecified abdominal location [R19.00]    Discharge Diagnoses: burkitts lymphoma newly diagnosed                                          Sepsis ruled out                                           DM                                          Mod PCM                                            Hyponatremia    Admission Condition: fair    Discharged Condition: good    Indication for Admission: abd pain with mesenteric mass    Hospital Course:   23 y.o. male with diabetes mellitus type 2, currently not on any medications, presented to ED with abdominal pain. Patient reported agonizing, epigastric pain, sudden onset today, denied any nausea, vomiting, fever, chills. Patient reported he has been having abdominal pain for the last 3 to 4 months. Dull aching, 2-3/10 in intensity, has very poor appetite, constipation. Patient reports that he usually has a bowel movement every 2 to 3 days, longest being 3 weeks, and also when he has a bowel movement it is small and hard pebble-like. Patient gives a history of weight loss of 100-140lbs since 2016. Does not have a primary care physician. Patient was prescribed Metformin for his diabetes, but did not tolerate it due to diarrhea and self discontinued it. Patient reports that he has been watching his diet, checks his blood sugar. Because of ongoing abdominal pain and constipation patient made an appointment with his PCP next week. For the pain was severe today which prompted him to come to the ER. Patient denied any urinary symptoms, last BM-1 week ago.       He was admitted for wt loss and constipation and CT abd showed mesenteric mass and biopsy showed high grade B cell lymphoma and suggestive of burkitts lymphoma. He had a port placed for chemo and discharged to [facility] for furtther treatment and work up. Consults: hematology/oncology and general surgery        Outstanding Order Results     No orders found from 1/19/2021 to 2/18/2021. Discharge Exam:  HEENT: Normal HEENT exam.    Lungs:  Normal effort. Heart: Normal rate. Abdomen: Abdomen is soft. Extremities: Decreased range of motion. Neurological: Patient is alert. Pupils:  Pupils are equal, round, and reactive to light. Skin:  Warm. Disposition: [facility]    Patient Instructions:   No current facility-administered medications for this encounter. Current Outpatient Medications   Medication Sig Dispense Refill    metFORMIN (GLUCOPHAGE-XR) 500 MG extended release tablet       naproxen (NAPROSYN) 500 MG tablet Take 1 tablet by mouth 2 times daily 60 tablet 0    acetaminophen (APAP EXTRA STRENGTH) 500 MG tablet Take 1 tablet by mouth every 6 hours as needed for Pain 30 tablet 0     Activity: activity as tolerated  Diet: regular diet  Wound Care: none needed    Follow-up with PCP.   Discharge time 30min  Signed:  Rosibel Hernandez  2/25/2021  5:09 PM

## 2021-02-25 NOTE — PROGRESS NOTES
Progress Note  Date:2021       [id number]  Patient Merlin Sanz     YOB: 1995     Age:25 y.o. Waiting to go to [facility]    Subjective    Subjective:  Symptoms:  Stable. Diet:  Adequate intake. Pain:  He complains of pain that is mild. Review of Systems  Objective         Vitals Last 24 Hours:  TEMPERATURE:  Temp  Av.9 °F (36.6 °C)  Min: 97.8 °F (36.6 °C)  Max: 98 °F (36.7 °C)  RESPIRATIONS RANGE: Resp  Av.5  Min: 16  Max: 17  PULSE OXIMETRY RANGE: SpO2  Av.4 %  Min: 73 %  Max: 100 %  PULSE RANGE: Pulse  Av.5  Min: 85  Max: 90  BLOOD PRESSURE RANGE: Systolic (34QAP), MHC:682 , Min:98 , WVX:700   ; Diastolic (80SLX), JCU:42, Min:54, Max:90    I/O (24Hr): No intake or output data in the 24 hours ending 21 0804  Objective:  General Appearance:  Comfortable. Vital signs: (most recent): Blood pressure 115/66, pulse 85, temperature 98 °F (36.7 °C), temperature source Oral, resp. rate 17, height 6' 4\" (1.93 m), weight 200 lb (90.7 kg), SpO2 96 %. Vital signs are normal.    HEENT: Normal HEENT exam.    Lungs:  Normal effort. Heart: Normal rate. Abdomen: Abdomen is soft. Extremities: Decreased range of motion. Neurological: Patient is alert. Pupils:  Pupils are equal, round, and reactive to light. Skin:  Warm. Labs/Imaging/Diagnostics    Labs:  CBC:  Recent Labs     21  0800 21  0632   WBC 9.8 11.8*   RBC 4.85 5.16   HGB 12.8* 13.7   HCT 39.8* 43.4   MCV 82.1 84.1   RDW 12.2 12.3    374     CHEMISTRIES:  Recent Labs     21  0800 21  0632    136   K 4.0 4.4   CL 99 98*   CO2 27 31   BUN 6 7   CREATININE 0.7* 0.8*   GLUCOSE 116* 122*     PT/INR:No results for input(s): PROTIME, INR in the last 72 hours. APTT:No results for input(s): APTT in the last 72 hours.   LIVER PROFILE:  Recent Labs     21  0800 21  0632   AST 13* 13*   ALT 14 17   BILITOT 0.4 0.3   ALKPHOS 79 82       Imaging Last 24 Hours:  No results found.   Assessment//Plan           Hospital Problems           Last Modified POA    Mesenteric mass 2/18/2021 Yes        Assessment & Plan  Constipation with mesenterical mass with wt loss  -surg consult and port placement  -biopsy with high grade B cell lymphoma suggestive for burkitts   -AFP, CEA, , CA 19-9 not elevated  -s/p med port yesterday  Supplements  Sepsis from Intra abd inefction(ruled out)  -bld cx neg so far  -zosyn stopped  Hyponatermia(improved)  -stop IVF as resolved  DM  -SSI  Mod PCM      Awaiting transfer to [facility] for burkitt lymphoma treatment  Electronically signed by Marylen Phenix, MD on 2/23/21 at 7:39 AM EST

## 2021-02-25 NOTE — PROGRESS NOTES
GENERAL SURGERY PROGRESS NOTE    CC/HPI:           Patient feels better, and going to oncology at [address] [place] [address] today for ChemoRx. Vitals:    02/24/21 0600 02/24/21 0840 02/24/21 2201 02/25/21 0845   BP: 130/80 (!) 126/90 115/66 119/78   Pulse: 85 90 85 85   Resp: 15 16 17 16   Temp: 97.6 °F (36.4 °C) 97.8 °F (36.6 °C) 98 °F (36.7 °C) 98 °F (36.7 °C)   TempSrc: Oral Oral Oral Oral   SpO2: 95%  96%    Weight:       Height:         No intake/output data recorded. I/O this shift:  In: 250 [P.O.:240;  I.V.:10]  Out: -     DIET GENERAL;    Recent Results (from the past 48 hour(s))   Lactate dehydrogenase    Collection Time: 02/23/21  4:37 PM   Result Value Ref Range     120 - 246 IU/L   Hepatitis Panel, Acute    Collection Time: 02/23/21  4:37 PM   Result Value Ref Range    Hepatitis B Surface Ag NON REACTIVE NON REACTIVE    Hep A IgM NON REACTIVE NON REACTIVE    Hep B Core Ab, IgM NON REACTIVE NON REACTIVE    Hepatitis C Ab NON REACTIVE NON REACTIVE   HIV Antigen/Antibody    Collection Time: 02/23/21  4:37 PM   Result Value Ref Range    HIV Screen NON REACTIVE NON REACTIVE   Uric acid    Collection Time: 02/23/21  4:37 PM   Result Value Ref Range    Uric Acid 3.1 (L) 3.5 - 7.2 MG/DL   POCT Glucose    Collection Time: 02/23/21  5:25 PM   Result Value Ref Range    POC Glucose 99 70 - 99 MG/DL   POCT Glucose    Collection Time: 02/23/21  9:23 PM   Result Value Ref Range    POC Glucose 156 (H) 70 - 99 MG/DL   CBC auto differential    Collection Time: 02/24/21  6:32 AM   Result Value Ref Range    WBC 11.8 (H) 4.0 - 10.5 K/CU MM    RBC 5.16 4.6 - 6.2 M/CU MM    Hemoglobin 13.7 13.5 - 18.0 GM/DL    Hematocrit 43.4 42 - 52 %    MCV 84.1 78 - 100 FL    MCH 26.6 (L) 27 - 31 PG    MCHC 31.6 (L) 32.0 - 36.0 %    RDW 12.3 11.7 - 14.9 %    Platelets 063 627 - 947 K/CU MM    MPV 9.0 7.5 - 11.1 FL    Differential Type AUTOMATED DIFFERENTIAL     Segs Relative 70.4 (H) 36 - 66 %    Lymphocytes % 16.9 (L) 24 - 44 %    Monocytes % 7.4 (H) 0 - 4 %    Eosinophils % 4.0 (H) 0 - 3 %    Basophils % 0.5 0 - 1 %    Segs Absolute 8.3 K/CU MM    Lymphocytes Absolute 2.0 K/CU MM    Monocytes Absolute 0.9 K/CU MM    Eosinophils Absolute 0.5 K/CU MM    Basophils Absolute 0.1 K/CU MM    Nucleated RBC % 0.0 %    Total Nucleated RBC 0.0 K/CU MM    Total Immature Neutrophil 0.09 K/CU MM    Immature Neutrophil % 0.8 (H) 0 - 0.43 %   Comprehensive Metabolic Panel w/ Reflex to MG    Collection Time: 02/24/21  6:32 AM   Result Value Ref Range    Sodium 136 135 - 145 MMOL/L    Potassium 4.4 3.5 - 5.1 MMOL/L    Chloride 98 (L) 99 - 110 mMol/L    CO2 31 21 - 32 MMOL/L    BUN 7 6 - 23 MG/DL    CREATININE 0.8 (L) 0.9 - 1.3 MG/DL    Glucose 122 (H) 70 - 99 MG/DL    Calcium 9.3 8.3 - 10.6 MG/DL    Albumin 3.3 (L) 3.4 - 5.0 GM/DL    Total Protein 7.1 6.4 - 8.2 GM/DL    Total Bilirubin 0.3 0.0 - 1.0 MG/DL    ALT 17 10 - 40 U/L    AST 13 (L) 15 - 37 IU/L    Alkaline Phosphatase 82 40 - 129 IU/L    GFR Non-African American >60 >60 mL/min/1.73m2    GFR African American >60 >60 mL/min/1.73m2    Anion Gap 7 4 - 16   POCT Glucose    Collection Time: 02/24/21  8:15 AM   Result Value Ref Range    POC Glucose 123 (H) 70 - 99 MG/DL   POCT Glucose    Collection Time: 02/24/21 11:27 AM   Result Value Ref Range    POC Glucose 136 (H) 70 - 99 MG/DL   POCT Glucose    Collection Time: 02/24/21  5:21 PM   Result Value Ref Range    POC Glucose 126 (H) 70 - 99 MG/DL   POCT Glucose    Collection Time: 02/24/21  9:10 PM   Result Value Ref Range    POC Glucose 168 (H) 70 - 99 MG/DL   CBC auto differential    Collection Time: 02/25/21  7:14 AM   Result Value Ref Range    WBC 13.8 (H) 4.0 - 10.5 K/CU MM    RBC 5.38 4.6 - 6.2 M/CU MM    Hemoglobin 14.2 13.5 - 18.0 GM/DL    Hematocrit 44.9 42 - 52 %    MCV 83.5 78 - 100 FL    MCH 26.4 (L) 27 - 31 PG    MCHC 31.6 (L) 32.0 - 36.0 %    RDW 12.4 11.7 - 14.9 %    Platelets 543 720 - 496 K/CU MM    MPV 9.1 7.5 - 11.1 FL    Differential Type AUTOMATED DIFFERENTIAL     Segs Relative 72.6 (H) 36 - 66 %    Lymphocytes % 17.4 (L) 24 - 44 %    Monocytes % 5.9 (H) 0 - 4 %    Eosinophils % 2.8 0 - 3 %    Basophils % 0.4 0 - 1 %    Segs Absolute 10.0 K/CU MM    Lymphocytes Absolute 2.4 K/CU MM    Monocytes Absolute 0.8 K/CU MM    Eosinophils Absolute 0.4 K/CU MM    Basophils Absolute 0.1 K/CU MM    Nucleated RBC % 0.0 %    Total Nucleated RBC 0.0 K/CU MM    Total Immature Neutrophil 0.12 K/CU MM    Immature Neutrophil % 0.9 (H) 0 - 0.43 %   Comprehensive Metabolic Panel w/ Reflex to MG    Collection Time: 02/25/21  7:14 AM   Result Value Ref Range    Sodium 134 (L) 135 - 145 MMOL/L    Potassium 4.6 3.5 - 5.1 MMOL/L    Chloride 94 (L) 99 - 110 mMol/L    CO2 30 21 - 32 MMOL/L    BUN 8 6 - 23 MG/DL    CREATININE 0.8 (L) 0.9 - 1.3 MG/DL    Glucose 127 (H) 70 - 99 MG/DL    Calcium 9.4 8.3 - 10.6 MG/DL    Albumin 3.5 3.4 - 5.0 GM/DL    Total Protein 7.7 6.4 - 8.2 GM/DL    Total Bilirubin 0.4 0.0 - 1.0 MG/DL    ALT 18 10 - 40 U/L    AST 17 15 - 37 IU/L    Alkaline Phosphatase 87 40 - 129 IU/L    GFR Non-African American >60 >60 mL/min/1.73m2    GFR African American >60 >60 mL/min/1.73m2    Anion Gap 10 4 - 16   POCT Glucose    Collection Time: 02/25/21  8:04 AM   Result Value Ref Range    POC Glucose 133 (H) 70 - 99 MG/DL   POCT Glucose    Collection Time: 02/25/21 12:17 PM   Result Value Ref Range    POC Glucose 184 (H) 70 - 99 MG/DL       Scheduled Meds:   enoxaparin  40 mg Subcutaneous Daily    nicotine  1 patch Transdermal Daily    polyethylene glycol  17 g Oral Daily    magnesium hydroxide  30 mL Oral Daily    sodium chloride flush  10 mL Intravenous 2 times per day    insulin lispro  0-12 Units Subcutaneous 4x Daily WC       Continuous Infusions:   dextrose         Physical Exam:  HEENT: Anicteric sclerae, Oropharyngeal mucosae moist, pink and intact.   Heart:  Normal S1 and S2, RRR  Lungs: Clear to auscultation bilaterally, No audible Wheezes or Rales.  Extremities: No edema. Neuro: Alert and Oriented x 3, Non focal.  Abdomen: Soft, Benign, Diffusely tender, Non distended, Positive bowel sounds. Yesterday's CXR after the port placed: Active Problems:    Mesenteric mass  Resolved Problems:    * No resolved hospital problems. *      Assessment and Plan:  Olivia Leblanc is a 23 y.o. male with Mesenteric masses, Path:  Final Pathologic Diagnosis:   Mesenteric mass, needle core biopsy:   -     HIGH GRADE B-CELL LYMPHOMA, see comment.       Electronically Signed Out By Siva Collier MD   Comment: This lymphoma has morphologic and immunophenotypic features   suggestive of Burkitt lymphoma. FISH studies are pending. POD # 1 s/p:  Placement of an 6.6-Irish, low profile Mediport via left  subclavian vein approach, using the micropuncture kit first, Under Ultrasound and Direct Fluoroscopy.     Doing very well, waiting on a bed in [facility] for Chemo starting TODAY!   I called the transfer center and expressed our need to please get him in ASAP.    ___________________________________________    Susana Mcmullen MD, FACS, FICS  2/25/2021  1:08 PM

## 2021-02-26 LAB
EKG ATRIAL RATE: 130 BPM
EKG DIAGNOSIS: NORMAL
EKG P AXIS: 36 DEGREES
EKG P-R INTERVAL: 152 MS
EKG Q-T INTERVAL: 278 MS
EKG QRS DURATION: 80 MS
EKG QTC CALCULATION (BAZETT): 409 MS
EKG R AXIS: 17 DEGREES
EKG T AXIS: 8 DEGREES
EKG VENTRICULAR RATE: 130 BPM

## 2021-03-03 NOTE — H&P
[address]   :1995   PO#:[id number]    SEX:male     Chief Complaint:  Mesentery mass, concerning for malignancy. History of Present Illness:   History of mesentery mass, concerning for malignancy. Biopsy was requested.     HISTORY AND PHYSICAL  Mesenteric mass [K63.89]  Right lower quadrant abdominal pain [R10.31]  Abdominal mass, unspecified abdominal location [R19.00]    Past Medical History:  Past Medical History:   Diagnosis Date    Asthma     Diabetes mellitus ([facility] [address]        Past Surgical History:  Past Surgical History:   Procedure Laterality Date    PORT SURGERY N/A 2021    PORT INSERTION performed by Kris Mak MD at [address] [place] [address] History:  Social History     Socioeconomic History    Marital status: Single     Spouse name: Not on file    Number of children: Not on file    Years of education: Not on file    Highest education level: Not on file   Occupational History    Not on file   Social Needs    Financial resource strain: Not on file    Food insecurity     Worry: Not on file     Inability: Not on file    Transportation needs     Medical: Not on file     Non-medical: Not on file   Tobacco Use    Smoking status: Current Every Day Smoker     Packs/day: 1.00     Years: 2.00     Pack years: 2.00     Types: Cigarettes    Smokeless tobacco: Never Used   Substance and Sexual Activity    Alcohol use: No    Drug use: No    Sexual activity: Not on file   Lifestyle    Physical activity     Days per week: Not on file     Minutes per session: Not on file    Stress: Not on file   Relationships    Social connections     Talks on phone: Not on file     Gets together: Not on file     Attends Hindu service: Not on file     Active member of club or organization: Not on file     Attends meetings of clubs or organizations: Not on file     Relationship status: Not on file    Intimate partner violence     Fear of current or ex partner: Not on file Emotionally abused: Not on file     Physically abused: Not on file     Forced sexual activity: Not on file   Other Topics Concern    Not on file   Social History Narrative    Not on file       Family History:  History reviewed. No pertinent family history. Allergies:  No Known Allergies    Medications:  No current facility-administered medications on file prior to encounter. Current Outpatient Medications on File Prior to Encounter   Medication Sig Dispense Refill    metFORMIN (GLUCOPHAGE-XR) 500 MG extended release tablet       naproxen (NAPROSYN) 500 MG tablet Take 1 tablet by mouth 2 times daily 60 tablet 0    acetaminophen (APAP EXTRA STRENGTH) 500 MG tablet Take 1 tablet by mouth every 6 hours as needed for Pain 30 tablet 0       Vital Signs:  /78   Pulse 85   Temp 98 °F (36.7 °C) (Oral)   Resp 16   Ht 6' 4\" (1.93 m)   Wt 200 lb (90.7 kg)   SpO2 96%   BMI 24.34 kg/m²   Body mass index is 24.34 kg/m². Laboratory:  No results for input(s): WBC, HEMOGLOBIN, NA, CL, CO2, BUN, CREATININE, GLUCOSE, INR, PTT, CKMB in the last 72 hours. Invalid input(s): HEMATOCRIT, PLATELETS, POTASSIUM, CA, PT, CK1, TROP  INR @LABR24(INR)@    Physical Exam:  GENERAL:Well developed, well nourished in NAD  NECK: Neck exam - No JVD,HJR or carotid bruit, no thyromegaly   RESPIRATORY:Clear to auscultation  HEART:RRR,no murmer, gallop or friction rub  ABDOMEN: Bowel sounds present, no tenderness to palpation. No organomegaly  EXTREMITIES: no edema or cyanosis  VASCULAR:  no ischemic changes  SKIN: no erythema, rubor or lesions noted  NEURO/PSYCH:Alert and oriented    EKG:    Imaging:    Chest: CTA    Heart: S1, S1    Impression:  Active Problems:    Mesenteric mass  Resolved Problems:    * No resolved hospital problems. *      Mesentery mass, concerning for malignancy. Biopsy was requested.     Mallampati Score 2  ASA class 2    PLAN OF CARE/PLANNED PROCEDURE    CT guided mesentery mass core needle biopsy.

## 2021-03-15 DIAGNOSIS — C83.70 BURKITT LYMPHOMA, UNSPECIFIED BODY REGION (HCC): ICD-10-CM

## 2021-03-15 DIAGNOSIS — C85.10 HIGH GRADE B-CELL LYMPHOMA (HCC): ICD-10-CM

## 2021-03-15 DIAGNOSIS — C85.10 B-CELL LYMPHOMA, UNSPECIFIED B-CELL LYMPHOMA TYPE, UNSPECIFIED BODY REGION (HCC): ICD-10-CM

## 2021-03-15 RX ORDER — 0.9 % SODIUM CHLORIDE 0.9 %
250 INTRAVENOUS SOLUTION INTRAVENOUS ONCE
Status: CANCELLED | OUTPATIENT
Start: 2021-03-18 | End: 2021-03-18

## 2021-03-15 RX ORDER — SODIUM CHLORIDE 0.9 % (FLUSH) 0.9 %
10 SYRINGE (ML) INJECTION
Status: CANCELLED
Start: 2021-03-18

## 2021-03-15 RX ORDER — HEPARIN SODIUM (PORCINE) LOCK FLUSH IV SOLN 100 UNIT/ML 100 UNIT/ML
500 SOLUTION INTRAVENOUS ONCE
Status: CANCELLED
Start: 2021-03-18 | End: 2021-03-18

## 2021-03-15 RX ORDER — SODIUM CHLORIDE 0.9 % (FLUSH) 0.9 %
20 SYRINGE (ML) INJECTION PRN
Status: CANCELLED | OUTPATIENT
Start: 2021-03-18

## 2021-03-18 ENCOUNTER — HOSPITAL ENCOUNTER (OUTPATIENT)
Dept: INFUSION THERAPY | Age: 26
Setting detail: INFUSION SERIES
Discharge: HOME | End: 2021-03-18
Payer: COMMERCIAL

## 2021-03-18 VITALS
HEART RATE: 92 BPM | RESPIRATION RATE: 16 BRPM | OXYGEN SATURATION: 98 % | DIASTOLIC BLOOD PRESSURE: 78 MMHG | SYSTOLIC BLOOD PRESSURE: 113 MMHG

## 2021-03-18 DIAGNOSIS — C85.10 HIGH GRADE B-CELL LYMPHOMA (HCC): Primary | ICD-10-CM

## 2021-03-18 LAB
ALBUMIN SERPL-MCNC: 3.6 GM/DL (ref 3.4–5)
ALP BLD-CCNC: 90 IU/L (ref 40–129)
ALT SERPL-CCNC: 44 U/L (ref 10–40)
ANION GAP SERPL CALCULATED.3IONS-SCNC: 11 MMOL/L (ref 4–16)
AST SERPL-CCNC: 18 IU/L (ref 15–37)
BANDED NEUTROPHILS ABSOLUTE COUNT: 1.85 K/CU MM
BANDED NEUTROPHILS RELATIVE PERCENT: 21 % (ref 5–11)
BILIRUB SERPL-MCNC: 0.2 MG/DL (ref 0–1)
BUN BLDV-MCNC: 9 MG/DL (ref 6–23)
CALCIUM SERPL-MCNC: 9.3 MG/DL (ref 8.3–10.6)
CHLORIDE BLD-SCNC: 98 MMOL/L (ref 99–110)
CO2: 28 MMOL/L (ref 21–32)
CREAT SERPL-MCNC: 0.8 MG/DL (ref 0.9–1.3)
DIFFERENTIAL TYPE: ABNORMAL
DOHLE BODIES: PRESENT
GFR AFRICAN AMERICAN: >60 ML/MIN/1.73M2
GFR NON-AFRICAN AMERICAN: >60 ML/MIN/1.73M2
GLUCOSE BLD-MCNC: 201 MG/DL (ref 70–99)
HCT VFR BLD CALC: 29.9 % (ref 42–52)
HEMOGLOBIN: 10.2 GM/DL (ref 13.5–18)
LYMPHOCYTES ABSOLUTE: 2.1 K/CU MM
LYMPHOCYTES RELATIVE PERCENT: 24 % (ref 24–44)
MCH RBC QN AUTO: 26.5 PG (ref 27–31)
MCHC RBC AUTO-ENTMCNC: 34.1 % (ref 32–36)
MCV RBC AUTO: 77.7 FL (ref 78–100)
METAMYELOCYTES ABSOLUTE COUNT: 1.58 K/CU MM
METAMYELOCYTES PERCENT: 18 %
MICROCYTES: ABNORMAL
MONOCYTES ABSOLUTE: 0.7 K/CU MM
MONOCYTES RELATIVE PERCENT: 8 % (ref 0–4)
MYELOCYTE PERCENT: 5 %
MYELOCYTES ABSOLUTE COUNT: 0.44 K/CU MM
NUCLEATED RED BLOOD CELLS: 1
PDW BLD-RTO: 11.5 % (ref 11.7–14.9)
PLATELET # BLD: 126 K/CU MM (ref 140–440)
PLT MORPHOLOGY: ABNORMAL
PMV BLD AUTO: 10.6 FL (ref 7.5–11.1)
POLYCHROMASIA: ABNORMAL
POTASSIUM SERPL-SCNC: 3.6 MMOL/L (ref 3.5–5.1)
PROMYELOCYTES ABSOLUTE COUNT: 0.26 K/CU MM
PROMYELOCYTES PERCENT: 3 %
RBC # BLD: 3.85 M/CU MM (ref 4.6–6.2)
SEGMENTED NEUTROPHILS ABSOLUTE COUNT: 1.8 K/CU MM
SEGMENTED NEUTROPHILS RELATIVE PERCENT: 20 % (ref 36–66)
SODIUM BLD-SCNC: 137 MMOL/L (ref 135–145)
TOTAL PROTEIN: 6.5 GM/DL (ref 6.4–8.2)
TOXIC GRANULATION: PRESENT
UNDIFFERENTIATED CELL: 1 %
WBC # BLD: 8.8 K/CU MM (ref 4–10.5)
WBC # BLD: ABNORMAL 10*3/UL

## 2021-03-18 PROCEDURE — 85007 BL SMEAR W/DIFF WBC COUNT: CPT

## 2021-03-18 PROCEDURE — 86900 BLOOD TYPING SEROLOGIC ABO: CPT

## 2021-03-18 PROCEDURE — 80053 COMPREHEN METABOLIC PANEL: CPT

## 2021-03-18 PROCEDURE — 6360000002 HC RX W HCPCS: Performed by: NURSE PRACTITIONER

## 2021-03-18 PROCEDURE — 36591 DRAW BLOOD OFF VENOUS DEVICE: CPT

## 2021-03-18 PROCEDURE — 2580000003 HC RX 258: Performed by: NURSE PRACTITIONER

## 2021-03-18 PROCEDURE — 85027 COMPLETE CBC AUTOMATED: CPT

## 2021-03-18 PROCEDURE — 99211 OFF/OP EST MAY X REQ PHY/QHP: CPT

## 2021-03-18 RX ORDER — SODIUM CHLORIDE 0.9 % (FLUSH) 0.9 %
10 SYRINGE (ML) INJECTION
Status: CANCELLED
Start: 2021-03-19

## 2021-03-18 RX ORDER — 0.9 % SODIUM CHLORIDE 0.9 %
250 INTRAVENOUS SOLUTION INTRAVENOUS ONCE
Status: CANCELLED | OUTPATIENT
Start: 2021-03-29 | End: 2021-03-29

## 2021-03-18 RX ORDER — HEPARIN SODIUM (PORCINE) LOCK FLUSH IV SOLN 100 UNIT/ML 100 UNIT/ML
500 SOLUTION INTRAVENOUS PRN
Status: CANCELLED
Start: 2021-03-29

## 2021-03-18 RX ORDER — HEPARIN SODIUM (PORCINE) LOCK FLUSH IV SOLN 100 UNIT/ML 100 UNIT/ML
500 SOLUTION INTRAVENOUS ONCE
Status: COMPLETED | OUTPATIENT
Start: 2021-03-18 | End: 2021-03-18

## 2021-03-18 RX ORDER — SODIUM CHLORIDE 0.9 % (FLUSH) 0.9 %
10 SYRINGE (ML) INJECTION PRN
Status: CANCELLED
Start: 2021-03-29

## 2021-03-18 RX ORDER — 0.9 % SODIUM CHLORIDE 0.9 %
250 INTRAVENOUS SOLUTION INTRAVENOUS ONCE
Status: CANCELLED | OUTPATIENT
Start: 2021-03-19 | End: 2021-03-19

## 2021-03-18 RX ORDER — SODIUM CHLORIDE 0.9 % (FLUSH) 0.9 %
10 SYRINGE (ML) INJECTION
Status: COMPLETED | OUTPATIENT
Start: 2021-03-18 | End: 2021-03-18

## 2021-03-18 RX ORDER — SODIUM CHLORIDE 0.9 % (FLUSH) 0.9 %
20 SYRINGE (ML) INJECTION PRN
Status: CANCELLED | OUTPATIENT
Start: 2021-03-19

## 2021-03-18 RX ORDER — HEPARIN SODIUM (PORCINE) LOCK FLUSH IV SOLN 100 UNIT/ML 100 UNIT/ML
500 SOLUTION INTRAVENOUS ONCE
Status: CANCELLED
Start: 2021-03-19 | End: 2021-03-19

## 2021-03-18 RX ORDER — SODIUM CHLORIDE 0.9 % (FLUSH) 0.9 %
20 SYRINGE (ML) INJECTION PRN
Status: CANCELLED | OUTPATIENT
Start: 2021-03-29

## 2021-03-18 RX ADMIN — SODIUM CHLORIDE, PRESERVATIVE FREE 10 ML: 5 INJECTION INTRAVENOUS at 08:30

## 2021-03-18 RX ADMIN — Medication 500 UNITS: at 08:54

## 2021-03-18 NOTE — PROGRESS NOTES
No transfusion needed as per standing orders.  Pt notified, port deaccessed, and pt transported to private vehicle by wheelchair

## 2021-03-25 PROBLEM — C83.70 BURKITT LYMPHOMA (HCC): Status: ACTIVE | Noted: 2021-03-25

## 2021-03-25 RX ORDER — SODIUM CHLORIDE 0.9 % (FLUSH) 0.9 %
10 SYRINGE (ML) INJECTION PRN
Status: CANCELLED | OUTPATIENT
Start: 2021-03-29

## 2021-03-29 ENCOUNTER — HOSPITAL ENCOUNTER (OUTPATIENT)
Dept: INFUSION THERAPY | Age: 26
Setting detail: INFUSION SERIES
Discharge: HOME | End: 2021-03-29
Payer: COMMERCIAL

## 2021-03-29 VITALS
SYSTOLIC BLOOD PRESSURE: 115 MMHG | HEART RATE: 80 BPM | DIASTOLIC BLOOD PRESSURE: 80 MMHG | TEMPERATURE: 97.9 F | RESPIRATION RATE: 16 BRPM

## 2021-03-29 DIAGNOSIS — C85.10 HIGH GRADE B-CELL LYMPHOMA (HCC): Primary | ICD-10-CM

## 2021-03-29 DIAGNOSIS — C83.70 BURKITT LYMPHOMA, UNSPECIFIED BODY REGION (HCC): ICD-10-CM

## 2021-03-29 LAB
ALBUMIN SERPL-MCNC: 3.8 GM/DL (ref 3.4–5)
ALP BLD-CCNC: 66 IU/L (ref 40–129)
ALT SERPL-CCNC: 21 U/L (ref 10–40)
ANION GAP SERPL CALCULATED.3IONS-SCNC: 9 MMOL/L (ref 4–16)
AST SERPL-CCNC: 9 IU/L (ref 15–37)
BASOPHILS ABSOLUTE: 0 K/CU MM
BASOPHILS RELATIVE PERCENT: 0.9 % (ref 0–1)
BILIRUB SERPL-MCNC: 0.4 MG/DL (ref 0–1)
BUN BLDV-MCNC: 13 MG/DL (ref 6–23)
CALCIUM SERPL-MCNC: 9.1 MG/DL (ref 8.3–10.6)
CHLORIDE BLD-SCNC: 98 MMOL/L (ref 99–110)
CO2: 28 MMOL/L (ref 21–32)
CREAT SERPL-MCNC: 0.6 MG/DL (ref 0.9–1.3)
DIFFERENTIAL TYPE: ABNORMAL
EOSINOPHILS ABSOLUTE: 0 K/CU MM
EOSINOPHILS RELATIVE PERCENT: 0 % (ref 0–3)
GFR AFRICAN AMERICAN: >60 ML/MIN/1.73M2
GFR NON-AFRICAN AMERICAN: >60 ML/MIN/1.73M2
GLUCOSE BLD-MCNC: 196 MG/DL (ref 70–99)
HCT VFR BLD CALC: 29.6 % (ref 42–52)
HEMOGLOBIN: 9.7 GM/DL (ref 13.5–18)
IMMATURE NEUTROPHIL %: 0.9 % (ref 0–0.43)
LYMPHOCYTES ABSOLUTE: 0.5 K/CU MM
LYMPHOCYTES RELATIVE PERCENT: 15.7 % (ref 24–44)
MCH RBC QN AUTO: 26.1 PG (ref 27–31)
MCHC RBC AUTO-ENTMCNC: 32.8 % (ref 32–36)
MCV RBC AUTO: 79.8 FL (ref 78–100)
MONOCYTES ABSOLUTE: 0 K/CU MM
MONOCYTES RELATIVE PERCENT: 0.6 % (ref 0–4)
NUCLEATED RBC %: 0 %
PDW BLD-RTO: 14.5 % (ref 11.7–14.9)
PLATELET # BLD: 231 K/CU MM (ref 140–440)
PMV BLD AUTO: 9.3 FL (ref 7.5–11.1)
POTASSIUM SERPL-SCNC: 3.9 MMOL/L (ref 3.5–5.1)
RBC # BLD: 3.71 M/CU MM (ref 4.6–6.2)
SEGMENTED NEUTROPHILS ABSOLUTE COUNT: 2.8 K/CU MM
SEGMENTED NEUTROPHILS RELATIVE PERCENT: 81.9 % (ref 36–66)
SODIUM BLD-SCNC: 135 MMOL/L (ref 135–145)
TOTAL IMMATURE NEUTOROPHIL: 0.03 K/CU MM
TOTAL NUCLEATED RBC: 0 K/CU MM
TOTAL PROTEIN: 6.3 GM/DL (ref 6.4–8.2)
WBC # BLD: 3.4 K/CU MM (ref 4–10.5)

## 2021-03-29 PROCEDURE — 36430 TRANSFUSION BLD/BLD COMPNT: CPT

## 2021-03-29 PROCEDURE — 80053 COMPREHEN METABOLIC PANEL: CPT

## 2021-03-29 PROCEDURE — 6360000002 HC RX W HCPCS: Performed by: NURSE PRACTITIONER

## 2021-03-29 PROCEDURE — 85025 COMPLETE CBC W/AUTO DIFF WBC: CPT

## 2021-03-29 PROCEDURE — 96372 THER/PROPH/DIAG INJ SC/IM: CPT

## 2021-03-29 PROCEDURE — 2580000003 HC RX 258: Performed by: NURSE PRACTITIONER

## 2021-03-29 RX ORDER — SODIUM CHLORIDE 0.9 % (FLUSH) 0.9 %
10 SYRINGE (ML) INJECTION PRN
Status: CANCELLED
Start: 2021-04-01

## 2021-03-29 RX ORDER — HEPARIN SODIUM (PORCINE) LOCK FLUSH IV SOLN 100 UNIT/ML 100 UNIT/ML
500 SOLUTION INTRAVENOUS PRN
Status: DISCONTINUED | OUTPATIENT
Start: 2021-03-29 | End: 2021-03-30 | Stop reason: HOSPADM

## 2021-03-29 RX ORDER — SODIUM CHLORIDE 0.9 % (FLUSH) 0.9 %
10 SYRINGE (ML) INJECTION PRN
Status: CANCELLED | OUTPATIENT
Start: 2021-04-01

## 2021-03-29 RX ORDER — SODIUM CHLORIDE 0.9 % (FLUSH) 0.9 %
10 SYRINGE (ML) INJECTION PRN
Status: DISCONTINUED | OUTPATIENT
Start: 2021-03-29 | End: 2021-03-30 | Stop reason: HOSPADM

## 2021-03-29 RX ORDER — 0.9 % SODIUM CHLORIDE 0.9 %
250 INTRAVENOUS SOLUTION INTRAVENOUS ONCE
Status: CANCELLED | OUTPATIENT
Start: 2021-04-01 | End: 2021-04-01

## 2021-03-29 RX ORDER — HEPARIN SODIUM (PORCINE) LOCK FLUSH IV SOLN 100 UNIT/ML 100 UNIT/ML
500 SOLUTION INTRAVENOUS PRN
Status: CANCELLED
Start: 2021-04-01

## 2021-03-29 RX ADMIN — SODIUM CHLORIDE, PRESERVATIVE FREE 10 ML: 5 INJECTION INTRAVENOUS at 09:50

## 2021-03-29 RX ADMIN — PEGFILGRASTIM 6 MG: 6 INJECTION SUBCUTANEOUS at 09:33

## 2021-03-29 RX ADMIN — Medication 500 UNITS: at 09:50

## 2021-03-29 NOTE — PROGRESS NOTES
Pt taken to room 05 for mediport labs. Pt oriented to room, call light, bed/chair controls, TV, pt voiced understanding. Plan of care explained to pt, pt voiced understanding.

## 2021-03-29 NOTE — PROGRESS NOTES
Kathleen deaccessed. DSD applied. Pt tolerated well. Discharged instructions given to pt, pt voiced understanding. Pt discharged via wheelchair by nurse to exit.

## 2021-04-01 ENCOUNTER — HOSPITAL ENCOUNTER (OUTPATIENT)
Dept: INFUSION THERAPY | Age: 26
Setting detail: INFUSION SERIES
Discharge: HOME | End: 2021-04-01
Payer: COMMERCIAL

## 2021-04-01 VITALS
OXYGEN SATURATION: 99 % | RESPIRATION RATE: 16 BRPM | DIASTOLIC BLOOD PRESSURE: 79 MMHG | TEMPERATURE: 97.3 F | SYSTOLIC BLOOD PRESSURE: 119 MMHG | HEART RATE: 91 BPM

## 2021-04-01 DIAGNOSIS — C85.10 B-CELL LYMPHOMA, UNSPECIFIED B-CELL LYMPHOMA TYPE, UNSPECIFIED BODY REGION (HCC): Primary | ICD-10-CM

## 2021-04-01 LAB
ALBUMIN SERPL-MCNC: 4 GM/DL (ref 3.4–5)
ALP BLD-CCNC: 68 IU/L (ref 40–128)
ALT SERPL-CCNC: 16 U/L (ref 10–40)
ANION GAP SERPL CALCULATED.3IONS-SCNC: 9 MMOL/L (ref 4–16)
ANISOCYTOSIS: ABNORMAL
AST SERPL-CCNC: 8 IU/L (ref 15–37)
ATYPICAL LYMPHOCYTE ABSOLUTE COUNT: ABNORMAL
BASOPHILS ABSOLUTE: 0 K/CU MM
BASOPHILS RELATIVE PERCENT: 2 % (ref 0–1)
BILIRUB SERPL-MCNC: 0.4 MG/DL (ref 0–1)
BUN BLDV-MCNC: 13 MG/DL (ref 6–23)
CALCIUM SERPL-MCNC: 9.2 MG/DL (ref 8.3–10.6)
CHLORIDE BLD-SCNC: 97 MMOL/L (ref 99–110)
CO2: 25 MMOL/L (ref 21–32)
CREAT SERPL-MCNC: 0.6 MG/DL (ref 0.9–1.3)
DIFFERENTIAL TYPE: ABNORMAL
GFR AFRICAN AMERICAN: >60 ML/MIN/1.73M2
GFR NON-AFRICAN AMERICAN: >60 ML/MIN/1.73M2
GLUCOSE BLD-MCNC: 211 MG/DL (ref 70–99)
HCT VFR BLD CALC: 24.1 % (ref 42–52)
HEMOGLOBIN: 8.5 GM/DL (ref 13.5–18)
LYMPHOCYTES ABSOLUTE: 0.5 K/CU MM
LYMPHOCYTES RELATIVE PERCENT: 90 % (ref 24–44)
MCH RBC QN AUTO: 27.6 PG (ref 27–31)
MCHC RBC AUTO-ENTMCNC: 35.3 % (ref 32–36)
MCV RBC AUTO: 78.2 FL (ref 78–100)
PDW BLD-RTO: 14 % (ref 11.7–14.9)
PLATELET # BLD: 54 K/CU MM (ref 140–440)
PMV BLD AUTO: 8.8 FL (ref 7.5–11.1)
POTASSIUM SERPL-SCNC: 3.9 MMOL/L (ref 3.5–5.1)
RBC # BLD: 3.08 M/CU MM (ref 4.6–6.2)
RBC # BLD: ABNORMAL 10*6/UL
SEGMENTED NEUTROPHILS ABSOLUTE COUNT: 0 K/CU MM
SEGMENTED NEUTROPHILS RELATIVE PERCENT: 8 % (ref 36–66)
SODIUM BLD-SCNC: 131 MMOL/L (ref 135–145)
TOTAL PROTEIN: 6.6 GM/DL (ref 6.4–8.2)
WBC # BLD: 0.5 K/CU MM (ref 4–10.5)

## 2021-04-01 PROCEDURE — 85027 COMPLETE CBC AUTOMATED: CPT

## 2021-04-01 PROCEDURE — 36591 DRAW BLOOD OFF VENOUS DEVICE: CPT

## 2021-04-01 PROCEDURE — 2580000003 HC RX 258: Performed by: NURSE PRACTITIONER

## 2021-04-01 PROCEDURE — 99211 OFF/OP EST MAY X REQ PHY/QHP: CPT

## 2021-04-01 PROCEDURE — 6360000002 HC RX W HCPCS: Performed by: NURSE PRACTITIONER

## 2021-04-01 PROCEDURE — 80053 COMPREHEN METABOLIC PANEL: CPT

## 2021-04-01 PROCEDURE — 85007 BL SMEAR W/DIFF WBC COUNT: CPT

## 2021-04-01 RX ORDER — SODIUM CHLORIDE 0.9 % (FLUSH) 0.9 %
10 SYRINGE (ML) INJECTION PRN
Status: CANCELLED | OUTPATIENT
Start: 2021-04-05

## 2021-04-01 RX ORDER — 0.9 % SODIUM CHLORIDE 0.9 %
250 INTRAVENOUS SOLUTION INTRAVENOUS ONCE
Status: CANCELLED | OUTPATIENT
Start: 2021-04-05 | End: 2021-04-05

## 2021-04-01 RX ORDER — SODIUM CHLORIDE 0.9 % (FLUSH) 0.9 %
10 SYRINGE (ML) INJECTION PRN
Status: CANCELLED
Start: 2021-04-05

## 2021-04-01 RX ORDER — SODIUM CHLORIDE 0.9 % (FLUSH) 0.9 %
10 SYRINGE (ML) INJECTION PRN
Status: DISCONTINUED | OUTPATIENT
Start: 2021-04-01 | End: 2021-04-02 | Stop reason: HOSPADM

## 2021-04-01 RX ORDER — HEPARIN SODIUM (PORCINE) LOCK FLUSH IV SOLN 100 UNIT/ML 100 UNIT/ML
500 SOLUTION INTRAVENOUS PRN
Status: CANCELLED
Start: 2021-04-05

## 2021-04-01 RX ORDER — HEPARIN SODIUM (PORCINE) LOCK FLUSH IV SOLN 100 UNIT/ML 100 UNIT/ML
500 SOLUTION INTRAVENOUS PRN
Status: DISCONTINUED | OUTPATIENT
Start: 2021-04-01 | End: 2021-04-02 | Stop reason: HOSPADM

## 2021-04-01 RX ADMIN — Medication 500 UNITS: at 08:52

## 2021-04-01 RX ADMIN — SODIUM CHLORIDE, PRESERVATIVE FREE 10 ML: 5 INJECTION INTRAVENOUS at 09:00

## 2021-04-01 RX ADMIN — Medication 500 UNITS: at 09:00

## 2021-04-01 RX ADMIN — SODIUM CHLORIDE, PRESERVATIVE FREE 20 ML: 5 INJECTION INTRAVENOUS at 08:52

## 2021-04-01 NOTE — PROGRESS NOTES
Prior to discharge, the After Visit Summary Discharge Instructions were reviewed with the patient. He was offered a printed version of the AVS, but declined the offer. Recurrent appointment, pt tolerated infusion well. Pt discharged home. Pt to exit via wheelchair.     Orders Placed This Encounter   Medications    pegfilgrastim (NEULASTA) injection 6 mg    sodium chloride flush 0.9 % injection 10 mL    heparin flush 100 UNIT/ML injection 500 Units    sodium chloride flush 0.9 % injection 10 mL

## 2021-04-05 ENCOUNTER — OFFICE VISIT (OUTPATIENT)
Dept: FAMILY MEDICINE CLINIC | Age: 26
End: 2021-04-05
Payer: COMMERCIAL

## 2021-04-05 ENCOUNTER — HOSPITAL ENCOUNTER (OUTPATIENT)
Dept: INFUSION THERAPY | Age: 26
Setting detail: INFUSION SERIES
Discharge: HOME | End: 2021-04-05
Payer: COMMERCIAL

## 2021-04-05 ENCOUNTER — TELEPHONE (OUTPATIENT)
Dept: FAMILY MEDICINE CLINIC | Age: 26
End: 2021-04-05

## 2021-04-05 VITALS
SYSTOLIC BLOOD PRESSURE: 110 MMHG | HEIGHT: 74 IN | WEIGHT: 173 LBS | TEMPERATURE: 98.6 F | OXYGEN SATURATION: 98 % | HEART RATE: 117 BPM | DIASTOLIC BLOOD PRESSURE: 72 MMHG | BODY MASS INDEX: 22.2 KG/M2

## 2021-04-05 DIAGNOSIS — R10.9 ABDOMINAL CRAMPS: ICD-10-CM

## 2021-04-05 DIAGNOSIS — C85.10 B-CELL LYMPHOMA, UNSPECIFIED B-CELL LYMPHOMA TYPE, UNSPECIFIED BODY REGION (HCC): Primary | ICD-10-CM

## 2021-04-05 DIAGNOSIS — E10.9 TYPE 1 DIABETES MELLITUS WITHOUT COMPLICATION (HCC): Primary | ICD-10-CM

## 2021-04-05 DIAGNOSIS — F32.9 MAJOR DEPRESSIVE DISORDER WITH CURRENT ACTIVE EPISODE, UNSPECIFIED DEPRESSION EPISODE SEVERITY, UNSPECIFIED WHETHER RECURRENT: ICD-10-CM

## 2021-04-05 DIAGNOSIS — C83.70 BURKITT LYMPHOMA, UNSPECIFIED BODY REGION (HCC): ICD-10-CM

## 2021-04-05 LAB
ALBUMIN SERPL-MCNC: 3.6 GM/DL (ref 3.4–5)
ALP BLD-CCNC: 69 IU/L (ref 40–128)
ALT SERPL-CCNC: 21 U/L (ref 10–40)
ANION GAP SERPL CALCULATED.3IONS-SCNC: 10 MMOL/L (ref 4–16)
AST SERPL-CCNC: 18 IU/L (ref 15–37)
BANDED NEUTROPHILS ABSOLUTE COUNT: 3.33 K/CU MM
BANDED NEUTROPHILS RELATIVE PERCENT: 18 % (ref 5–11)
BILIRUB SERPL-MCNC: 0.2 MG/DL (ref 0–1)
BUN BLDV-MCNC: 6 MG/DL (ref 6–23)
CALCIUM SERPL-MCNC: 8.9 MG/DL (ref 8.3–10.6)
CHLORIDE BLD-SCNC: 100 MMOL/L (ref 99–110)
CO2: 26 MMOL/L (ref 21–32)
CREAT SERPL-MCNC: 0.7 MG/DL (ref 0.9–1.3)
DIFFERENTIAL TYPE: ABNORMAL
EOSINOPHILS ABSOLUTE: 0.2 K/CU MM
EOSINOPHILS RELATIVE PERCENT: 1 % (ref 0–3)
GFR AFRICAN AMERICAN: >60 ML/MIN/1.73M2
GFR NON-AFRICAN AMERICAN: >60 ML/MIN/1.73M2
GLUCOSE BLD-MCNC: 261 MG/DL (ref 70–99)
HCT VFR BLD CALC: 20 % (ref 42–52)
HEMOGLOBIN: 7 GM/DL (ref 13.5–18)
LYMPHOCYTES ABSOLUTE: 6.3 K/CU MM
LYMPHOCYTES RELATIVE PERCENT: 34 % (ref 24–44)
MCH RBC QN AUTO: 27 PG (ref 27–31)
MCHC RBC AUTO-ENTMCNC: 35 % (ref 32–36)
MCV RBC AUTO: 77.2 FL (ref 78–100)
METAMYELOCYTES ABSOLUTE COUNT: 0.19 K/CU MM
METAMYELOCYTES PERCENT: 1 %
MICROCYTES: ABNORMAL
MONOCYTES ABSOLUTE: 0.9 K/CU MM
MONOCYTES RELATIVE PERCENT: 5 % (ref 0–4)
MYELOCYTE PERCENT: 3 %
MYELOCYTES ABSOLUTE COUNT: 0.56 K/CU MM
PDW BLD-RTO: 14.2 % (ref 11.7–14.9)
PLATELET # BLD: 52 K/CU MM (ref 140–440)
PMV BLD AUTO: 10.8 FL (ref 7.5–11.1)
POTASSIUM SERPL-SCNC: 3.3 MMOL/L (ref 3.5–5.1)
PROMYELOCYTES ABSOLUTE COUNT: 0.19 K/CU MM
PROMYELOCYTES PERCENT: 1 %
RBC # BLD: 2.59 M/CU MM (ref 4.6–6.2)
SEGMENTED NEUTROPHILS ABSOLUTE COUNT: 6.8 K/CU MM
SEGMENTED NEUTROPHILS RELATIVE PERCENT: 37 % (ref 36–66)
SODIUM BLD-SCNC: 136 MMOL/L (ref 135–145)
TOTAL PROTEIN: 5.9 GM/DL (ref 6.4–8.2)
WBC # BLD: 18.5 K/CU MM (ref 4–10.5)

## 2021-04-05 PROCEDURE — 85007 BL SMEAR W/DIFF WBC COUNT: CPT

## 2021-04-05 PROCEDURE — 99204 OFFICE O/P NEW MOD 45 MIN: CPT | Performed by: PHYSICIAN ASSISTANT

## 2021-04-05 PROCEDURE — 86922 COMPATIBILITY TEST ANTIGLOB: CPT

## 2021-04-05 PROCEDURE — 6360000002 HC RX W HCPCS: Performed by: NURSE PRACTITIONER

## 2021-04-05 PROCEDURE — 2580000003 HC RX 258: Performed by: NURSE PRACTITIONER

## 2021-04-05 PROCEDURE — 86900 BLOOD TYPING SEROLOGIC ABO: CPT

## 2021-04-05 PROCEDURE — 3051F HG A1C>EQUAL 7.0%<8.0%: CPT | Performed by: PHYSICIAN ASSISTANT

## 2021-04-05 PROCEDURE — 85027 COMPLETE CBC AUTOMATED: CPT

## 2021-04-05 PROCEDURE — 80053 COMPREHEN METABOLIC PANEL: CPT

## 2021-04-05 PROCEDURE — 86850 RBC ANTIBODY SCREEN: CPT

## 2021-04-05 PROCEDURE — 36591 DRAW BLOOD OFF VENOUS DEVICE: CPT

## 2021-04-05 PROCEDURE — 86901 BLOOD TYPING SEROLOGIC RH(D): CPT

## 2021-04-05 PROCEDURE — G8420 CALC BMI NORM PARAMETERS: HCPCS | Performed by: PHYSICIAN ASSISTANT

## 2021-04-05 PROCEDURE — G8427 DOCREV CUR MEDS BY ELIG CLIN: HCPCS | Performed by: PHYSICIAN ASSISTANT

## 2021-04-05 PROCEDURE — 4004F PT TOBACCO SCREEN RCVD TLK: CPT | Performed by: PHYSICIAN ASSISTANT

## 2021-04-05 PROCEDURE — 2022F DILAT RTA XM EVC RTNOPTHY: CPT | Performed by: PHYSICIAN ASSISTANT

## 2021-04-05 RX ORDER — SULFAMETHOXAZOLE AND TRIMETHOPRIM 800; 160 MG/1; MG/1
1 TABLET ORAL ONCE
COMMUNITY
End: 2021-10-04

## 2021-04-05 RX ORDER — ESCITALOPRAM OXALATE 10 MG/1
10 TABLET ORAL DAILY
COMMUNITY
End: 2021-04-05

## 2021-04-05 RX ORDER — INSULIN ASPART 100 [IU]/ML
INJECTION, SOLUTION INTRAVENOUS; SUBCUTANEOUS
Qty: 5 PEN | Refills: 1 | Status: SHIPPED | OUTPATIENT
Start: 2021-04-05

## 2021-04-05 RX ORDER — HEPARIN SODIUM (PORCINE) LOCK FLUSH IV SOLN 100 UNIT/ML 100 UNIT/ML
500 SOLUTION INTRAVENOUS PRN
Status: DISCONTINUED | OUTPATIENT
Start: 2021-04-05 | End: 2021-04-06 | Stop reason: HOSPADM

## 2021-04-05 RX ORDER — SODIUM CHLORIDE 0.9 % (FLUSH) 0.9 %
10 SYRINGE (ML) INJECTION PRN
Status: DISCONTINUED | OUTPATIENT
Start: 2021-04-05 | End: 2021-04-06 | Stop reason: HOSPADM

## 2021-04-05 RX ORDER — TRAZODONE HYDROCHLORIDE 50 MG/1
50 TABLET ORAL NIGHTLY
COMMUNITY
End: 2021-04-05

## 2021-04-05 RX ORDER — INSULIN ASPART 100 [IU]/ML
INJECTION, SOLUTION INTRAVENOUS; SUBCUTANEOUS
COMMUNITY
End: 2021-04-05 | Stop reason: SDUPTHER

## 2021-04-05 RX ORDER — VALACYCLOVIR HYDROCHLORIDE 500 MG/1
500 TABLET, FILM COATED ORAL DAILY
COMMUNITY
End: 2022-06-06

## 2021-04-05 RX ORDER — SODIUM CHLORIDE 0.9 % (FLUSH) 0.9 %
10 SYRINGE (ML) INJECTION PRN
Status: CANCELLED
Start: 2021-04-08

## 2021-04-05 RX ORDER — HEPARIN SODIUM (PORCINE) LOCK FLUSH IV SOLN 100 UNIT/ML 100 UNIT/ML
500 SOLUTION INTRAVENOUS PRN
Status: CANCELLED
Start: 2021-04-08

## 2021-04-05 RX ORDER — SODIUM CHLORIDE 0.9 % (FLUSH) 0.9 %
10 SYRINGE (ML) INJECTION PRN
Status: CANCELLED | OUTPATIENT
Start: 2021-04-08

## 2021-04-05 RX ORDER — PANTOPRAZOLE SODIUM 40 MG/1
40 TABLET, DELAYED RELEASE ORAL DAILY
COMMUNITY

## 2021-04-05 RX ORDER — LEVOFLOXACIN 500 MG/1
500 TABLET, FILM COATED ORAL DAILY
COMMUNITY
End: 2021-10-04

## 2021-04-05 RX ORDER — PROCHLORPERAZINE MALEATE 10 MG
10 TABLET ORAL EVERY 6 HOURS PRN
COMMUNITY

## 2021-04-05 RX ORDER — ONDANSETRON 4 MG/1
4 TABLET, FILM COATED ORAL EVERY 8 HOURS PRN
COMMUNITY

## 2021-04-05 RX ORDER — 0.9 % SODIUM CHLORIDE 0.9 %
250 INTRAVENOUS SOLUTION INTRAVENOUS ONCE
Status: CANCELLED | OUTPATIENT
Start: 2021-04-08 | End: 2021-04-08

## 2021-04-05 RX ORDER — SENNA PLUS 8.6 MG/1
1 TABLET ORAL 2 TIMES DAILY
COMMUNITY
End: 2022-06-06

## 2021-04-05 RX ORDER — FLUCONAZOLE 200 MG/1
200 TABLET ORAL DAILY
COMMUNITY

## 2021-04-05 RX ORDER — ESCITALOPRAM OXALATE 10 MG/1
10 TABLET ORAL DAILY
Qty: 90 TABLET | Refills: 1 | Status: SHIPPED | OUTPATIENT
Start: 2021-04-05 | End: 2021-09-16 | Stop reason: SDUPTHER

## 2021-04-05 RX ADMIN — Medication 500 UNITS: at 14:39

## 2021-04-05 RX ADMIN — SODIUM CHLORIDE, PRESERVATIVE FREE 10 ML: 5 INJECTION INTRAVENOUS at 14:39

## 2021-04-05 SDOH — ECONOMIC STABILITY: TRANSPORTATION INSECURITY
IN THE PAST 12 MONTHS, HAS LACK OF TRANSPORTATION KEPT YOU FROM MEETINGS, WORK, OR FROM GETTING THINGS NEEDED FOR DAILY LIVING?: NO

## 2021-04-05 SDOH — ECONOMIC STABILITY: TRANSPORTATION INSECURITY
IN THE PAST 12 MONTHS, HAS THE LACK OF TRANSPORTATION KEPT YOU FROM MEDICAL APPOINTMENTS OR FROM GETTING MEDICATIONS?: NO

## 2021-04-05 SDOH — ECONOMIC STABILITY: FOOD INSECURITY: WITHIN THE PAST 12 MONTHS, YOU WORRIED THAT YOUR FOOD WOULD RUN OUT BEFORE YOU GOT MONEY TO BUY MORE.: NEVER TRUE

## 2021-04-05 ASSESSMENT — PATIENT HEALTH QUESTIONNAIRE - PHQ9: SUM OF ALL RESPONSES TO PHQ QUESTIONS 1-9: 0

## 2021-04-05 NOTE — PROGRESS NOTES
4/5/2021    [facility]    Chief Complaint   Patient presents with   Walter Dillon Doctor     new patient       HPI  History obtained from the patient and his sister. Luisa Mejia is a 23 y.o. male who presents today for establishment as a new patient. The patient's last PCP was his pediatrician, Dr. Hanh Miller. 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 Surgical history significant for port placement in late February 2021. Patient lives with his family (both parents and his sister). He was working at [employer] because his cancer diagnosis but no longer works there. Patient admits smoking cigarrettes about 3/4 pack per day X \"on and off since I turned 20.\" Patient admits occasional alcohol use: \"I drink maybe 8 times a year. \" Patient admits sexual activity. Family history significant for lymphoma in maternal aunt, Type 2 diabetes in father and maternal grand mother. NKDA. He follow with oncology at [facility] at [facility] in [facility]. His next appointment with oncology is April 12th. Burkitt Lymphoma - The patient started experiencing \"burning\" pain in his right abdomen in February, so he went to the ED for evaluation and was found to have an abdominal mass. The patient was diagnosed with lymphoma. He follows with an oncologist at the [place] [address] at [facility], Dr. Killian Victoria. He has been undergoing chemotherapy for this. He notes that he weighed over 200 pounds, but he has lost almost 30 pounds over the last few months. Diabetes - The patient states that he was in the ED for a suicide attempt in November 2020, and they noticed that his glucose was elevated. The patient was diagnosed with diabetes and started on Metformin 500 mg twice daily, as well as sliding scale NovoLog. Abdominal Cramps - Patient complains of intermittent abdominal cramps.   He states that he started drinking less carbonated beverages and taking Gas-X, and this resolved the cramps. Patient admits frequent issues with constipation. Depression and Anxiety - The patient states that he had severe depression a few months ago and went to the ED in November 2020 for a suicide attempt. He states that he was given 10 days of Lexapro, which he took but did not notice much of a difference due to the short duration. He states that his depression and anxiety have actually improved over the last few months, despite everything that is been going on. He states that he no longer has thoughts of suicide. He only occasionally has anxiety, but it has improved. He was seeing a psychiatrist, but his appointments \"ran out. \"    REVIEW OF SYMPTOMS  Review of Systems   Constitutional: Negative for chills and fever. Respiratory: Negative for cough and shortness of breath. Gastrointestinal: Negative for diarrhea and vomiting.      PAST MEDICAL HISTORY  Past Medical History:   Diagnosis Date    Asthma     [facility] 3[facility]25[facility]21    Diabetes mellitus ([facility]     [facility]cell [facility]     Unspecified B-cell lymphoma, unspecified site ([facility] 3/29/2021       FAMILY HISTORY  Family History   Problem Relation Age of Onset    Diabetes type 2  Father     Cancer Maternal Aunt     Diabetes type 2  Maternal Grandmother        SOCIAL HISTORY  Social History     Socioeconomic History    Marital status: Single     Spouse name: None    Number of children: None    Years of education: None    Highest education level: None   Occupational History    None   Social Needs    Financial resource strain: Not hard at all   Felicia-Jose Manuel insecurity     Worry: Never true     Inability: Never true    Transportation needs     Medical: No     Non-medical: No   Tobacco Use    Smoking status: Current Every Day Smoker     Packs/day: 0.75     Years: 5.00     Pack years: 3.75     Types: Cigarettes     Start date: 2016    Smokeless tobacco: Never Used Substance and Sexual Activity    Alcohol use: No    Drug use: No    Sexual activity: Yes   Lifestyle    Physical activity     Days per week: None     Minutes per session: None    Stress: None   Relationships    Social connections     Talks on phone: None     Gets together: None     Attends Hindu service: None     Active member of club or organization: None     Attends meetings of clubs or organizations: None     Relationship status: None    Intimate partner violence     Fear of current or ex partner: None     Emotionally abused: None     Physically abused: None     Forced sexual activity: None   Other Topics Concern    None   Social History Narrative    None        SURGICAL HISTORY  Past Surgical History:   Procedure Laterality Date    PORT SURGERY N/A 2/24/2021    PORT INSERTION performed by Milli Esparza MD at [address] [place] [address]  Current Outpatient Medications   Medication Sig Dispense Refill    pantoprazole (PROTONIX) 40 MG tablet Take 40 mg by mouth daily      senna (SENOKOT) 8.6 MG tablet Take 1 tablet by mouth 2 times daily      fluconazole (DIFLUCAN) 200 MG tablet Take 200 mg by mouth daily      levoFLOXacin (LEVAQUIN) 500 MG tablet Take 500 mg by mouth daily      prochlorperazine (COMPAZINE) 10 MG tablet Take 10 mg by mouth every 6 hours as needed      sulfamethoxazole-trimethoprim (BACTRIM DS;SEPTRA DS) 800-160 MG per tablet Take 1 tablet by mouth once      valACYclovir (VALTREX) 500 MG tablet Take 500 mg by mouth daily      ondansetron (ZOFRAN) 4 MG tablet Take 4 mg by mouth every 8 hours as needed for Nausea or Vomiting      escitalopram (LEXAPRO) 10 MG tablet Take 1 tablet by mouth daily 90 tablet 1    insulin aspart (NOVOLOG FLEXPEN) 100 UNIT/ML injection pen Sliding scale ( pt unsure units) 5 pen 1    metFORMIN (GLUCOPHAGE-XR) 500 MG extended release tablet Take by mouth 2 times daily (with meals)       naproxen (NAPROSYN) 500 MG tablet Take 1 tablet by mouth 2 times daily 60 tablet 0    acetaminophen (APAP EXTRA STRENGTH) 500 MG tablet Take 1 tablet by mouth every 6 hours as needed for Pain 30 tablet 0     No current facility-administered medications for this visit. Facility-Administered Medications Ordered in Other Visits   Medication Dose Route Frequency Provider Last Rate Last Admin    sodium chloride flush 0.9 % injection 10 mL  10 mL Intravenous PRN Kelly Kwame, APRN - CNP        heparin flush 100 UNIT/ML injection 500 Units  500 Units Intracatheter PRN Kelly Kwame, APRN - CNP        sodium chloride flush 0.9 % injection 10 mL  10 mL Intravenous PRN Kelly Kwame, APRN - CNP           ALLERGIES  No Known Allergies    RECENT LABS    Lab Results   Component Value Date    LABA1C 7.3 (H) 02/18/2021     Lab Results   Component Value Date     02/18/2021       No results found for: CHOL  No results found for: [facility] [place] [facility]    Lab Results   Component Value Date    WBC 0.5 (LL) 04/01/2021    HGB 8.5 (L) 04/01/2021    HCT 24.1 (L) 04/01/2021    MCV 78.2 04/01/2021    PLT 54 (L) 04/01/2021       PHYSICAL EXAM  /72   Pulse 117   Temp 98.6 °F (37 °C)   Ht 6' 2\" (1.88 m)   Wt 173 lb (78.5 kg)   SpO2 98%   BMI 22.21 kg/m²     Physical Exam  Constitutional:       Comments: Patient is pale. HENT:      Head: Normocephalic and atraumatic. Eyes:      Comments: EOM grossly intact. Cardiovascular:      Rate and Rhythm: Normal rate and regular rhythm. Heart sounds: No murmur. No friction rub. No gallop. Pulmonary:      Effort: Pulmonary effort is normal.      Breath sounds: Normal breath sounds. No wheezing, rhonchi or rales. Skin:     General: Skin is warm and dry. Coloration: Skin is pale. Neurological:      Mental Status: He is alert and oriented to person, place, and time.       Comments: Cranial nerves II-XII grossly intact   Psychiatric:         Mood and Affect: Mood normal.         Behavior: Behavior normal.         ASSESSMENT & PLAN  1. Burkitt lymphoma, unspecified body region [facility])  Patient follows with oncologist at [address] at [facility] in [place]. 2. Type 1 diabetes mellitus without complication ([address]  Patient's A1c in November 2020 was 10.8%. It was last checked on 2/18/2021 and was 7.3%. We will plan to recheck A1c around 5/18/2021.  - insulin aspart (NOVOLOG FLEXPEN) 100 UNIT/ML injection pen; Sliding scale ( pt unsure units)  Dispense: 5 pen; Refill: 1    3. Abdominal cramps  Resolved with reducing carbonated beverage intake and taking Gas-X. I explained that constipation may also be contributing to abdominal cramps and I instructed the patient to stay well-hydrated with plenty of water(64 ounces per day), limit carbohydrates and dairy in his diet while increasing fruits and vegetables, try a probiotic such as Culturelle and/or fiber supplement such as Benefiber, Citrucel, Metamucil. He may also try stool softener such as Colace, and if he continues to have issues of constipation despite these measures, may use MiraLAX and if severe enough, magnesium citrate. 4. Major depressive disorder with current active episode, unspecified depression episode severity, unspecified whether recurrent  Patient would like to restart on Lexapro. Prescribed Lexapro 10 mg daily and will follow up with the patient in 6 weeks. - escitalopram (LEXAPRO) 10 MG tablet; Take 1 tablet by mouth daily  Dispense: 90 tablet; Refill: 1          Return in about 6 weeks (around 5/17/2021).             Electronically signed by George Woo PA-C on 4/5/2021

## 2021-04-05 NOTE — PATIENT INSTRUCTIONS
Constipation  1. Make sure to drink plenty of water daily - The goal is at least 64 ounces of water daily  2. Stay active! Exercise regularly. 3. Reduce carbohydrates and dairy (I.e. bread, cheese) in your diet and eat more fruits and vegetables. 4. May try prunes or prune juice. 5. Try a daily probiotic, such as Culturelle  6. Try fiber supplements, such as Benefiber, Citrucel, Metamucil  7. Try a stool softener, such as Docusate (Colace)    If you continue to have an issue with constipation despite these measures, I recommend Miralax. If constipation is severe, you may try Magnesium Citrate (bottled solution).

## 2021-04-06 ENCOUNTER — HOSPITAL ENCOUNTER (OUTPATIENT)
Dept: INFUSION THERAPY | Age: 26
Setting detail: INFUSION SERIES
Discharge: HOME | End: 2021-04-06
Payer: COMMERCIAL

## 2021-04-06 VITALS
HEART RATE: 82 BPM | SYSTOLIC BLOOD PRESSURE: 106 MMHG | WEIGHT: 173 LBS | TEMPERATURE: 97.5 F | BODY MASS INDEX: 22.2 KG/M2 | HEIGHT: 74 IN | RESPIRATION RATE: 16 BRPM | OXYGEN SATURATION: 100 % | DIASTOLIC BLOOD PRESSURE: 64 MMHG

## 2021-04-06 DIAGNOSIS — C85.10 B-CELL LYMPHOMA, UNSPECIFIED B-CELL LYMPHOMA TYPE, UNSPECIFIED BODY REGION (HCC): Primary | ICD-10-CM

## 2021-04-06 LAB
ALBUMIN SERPL-MCNC: 3.3 GM/DL (ref 3.4–5)
ALP BLD-CCNC: 74 IU/L (ref 40–129)
ALT SERPL-CCNC: 19 U/L (ref 10–40)
ANION GAP SERPL CALCULATED.3IONS-SCNC: 8 MMOL/L (ref 4–16)
ANISOCYTOSIS: ABNORMAL
AST SERPL-CCNC: 15 IU/L (ref 15–37)
ATYPICAL LYMPHOCYTE ABSOLUTE COUNT: ABNORMAL
BANDED NEUTROPHILS ABSOLUTE COUNT: 3.81 K/CU MM
BANDED NEUTROPHILS RELATIVE PERCENT: 15 % (ref 5–11)
BILIRUB SERPL-MCNC: 0.1 MG/DL (ref 0–1)
BUN BLDV-MCNC: 7 MG/DL (ref 6–23)
CALCIUM SERPL-MCNC: 8.6 MG/DL (ref 8.3–10.6)
CHLORIDE BLD-SCNC: 102 MMOL/L (ref 99–110)
CO2: 27 MMOL/L (ref 21–32)
CREAT SERPL-MCNC: 0.7 MG/DL (ref 0.9–1.3)
DIFFERENTIAL TYPE: ABNORMAL
DOHLE BODIES: PRESENT
GFR AFRICAN AMERICAN: >60 ML/MIN/1.73M2
GFR NON-AFRICAN AMERICAN: >60 ML/MIN/1.73M2
GLUCOSE BLD-MCNC: 153 MG/DL (ref 70–99)
HCT VFR BLD CALC: 20.6 % (ref 42–52)
HEMOGLOBIN: 7 GM/DL (ref 13.5–18)
LYMPHOCYTES ABSOLUTE: 4.6 K/CU MM
LYMPHOCYTES RELATIVE PERCENT: 18 % (ref 24–44)
MCH RBC QN AUTO: 26.5 PG (ref 27–31)
MCHC RBC AUTO-ENTMCNC: 34 % (ref 32–36)
MCV RBC AUTO: 78 FL (ref 78–100)
METAMYELOCYTES ABSOLUTE COUNT: 1.27 K/CU MM
METAMYELOCYTES PERCENT: 5 %
MONOCYTES ABSOLUTE: 1 K/CU MM
MONOCYTES RELATIVE PERCENT: 4 % (ref 0–4)
MYELOCYTE PERCENT: 6 %
MYELOCYTES ABSOLUTE COUNT: 1.52 K/CU MM
NUCLEATED RED BLOOD CELLS: 1
PDW BLD-RTO: 14.4 % (ref 11.7–14.9)
PLATELET # BLD: 69 K/CU MM (ref 140–440)
PMV BLD AUTO: 10 FL (ref 7.5–11.1)
POTASSIUM SERPL-SCNC: 3.1 MMOL/L (ref 3.5–5.1)
PROMYELOCYTES ABSOLUTE COUNT: 0.25 K/CU MM
PROMYELOCYTES PERCENT: 1 %
RBC # BLD: 2.64 M/CU MM (ref 4.6–6.2)
SEGMENTED NEUTROPHILS ABSOLUTE COUNT: 12.9 K/CU MM
SEGMENTED NEUTROPHILS RELATIVE PERCENT: 51 % (ref 36–66)
SODIUM BLD-SCNC: 137 MMOL/L (ref 135–145)
TOTAL PROTEIN: 5.8 GM/DL (ref 6.4–8.2)
TOXIC GRANULATION: PRESENT
WBC # BLD: 25.4 K/CU MM (ref 4–10.5)
WBC # BLD: ABNORMAL 10*3/UL

## 2021-04-06 PROCEDURE — 6360000002 HC RX W HCPCS: Performed by: NURSE PRACTITIONER

## 2021-04-06 PROCEDURE — 36430 TRANSFUSION BLD/BLD COMPNT: CPT

## 2021-04-06 PROCEDURE — 99211 OFF/OP EST MAY X REQ PHY/QHP: CPT

## 2021-04-06 PROCEDURE — 2580000003 HC RX 258: Performed by: NURSE PRACTITIONER

## 2021-04-06 PROCEDURE — 85007 BL SMEAR W/DIFF WBC COUNT: CPT

## 2021-04-06 PROCEDURE — 80053 COMPREHEN METABOLIC PANEL: CPT

## 2021-04-06 PROCEDURE — 85027 COMPLETE CBC AUTOMATED: CPT

## 2021-04-06 PROCEDURE — P9016 RBC LEUKOCYTES REDUCED: HCPCS

## 2021-04-06 RX ORDER — SODIUM CHLORIDE 0.9 % (FLUSH) 0.9 %
10 SYRINGE (ML) INJECTION PRN
Status: CANCELLED
Start: 2021-04-08

## 2021-04-06 RX ORDER — SODIUM CHLORIDE 0.9 % (FLUSH) 0.9 %
10 SYRINGE (ML) INJECTION PRN
Status: CANCELLED | OUTPATIENT
Start: 2021-04-08

## 2021-04-06 RX ORDER — SODIUM CHLORIDE 0.9 % (FLUSH) 0.9 %
10 SYRINGE (ML) INJECTION PRN
Status: DISCONTINUED | OUTPATIENT
Start: 2021-04-06 | End: 2021-04-07 | Stop reason: HOSPADM

## 2021-04-06 RX ORDER — 0.9 % SODIUM CHLORIDE 0.9 %
250 INTRAVENOUS SOLUTION INTRAVENOUS ONCE
Status: CANCELLED | OUTPATIENT
Start: 2021-04-08 | End: 2021-04-08

## 2021-04-06 RX ORDER — 0.9 % SODIUM CHLORIDE 0.9 %
250 INTRAVENOUS SOLUTION INTRAVENOUS ONCE
Status: COMPLETED | OUTPATIENT
Start: 2021-04-06 | End: 2021-04-06

## 2021-04-06 RX ORDER — HEPARIN SODIUM (PORCINE) LOCK FLUSH IV SOLN 100 UNIT/ML 100 UNIT/ML
500 SOLUTION INTRAVENOUS PRN
Status: DISCONTINUED | OUTPATIENT
Start: 2021-04-06 | End: 2021-04-07 | Stop reason: HOSPADM

## 2021-04-06 RX ORDER — HEPARIN SODIUM (PORCINE) LOCK FLUSH IV SOLN 100 UNIT/ML 100 UNIT/ML
500 SOLUTION INTRAVENOUS PRN
Status: CANCELLED
Start: 2021-04-08

## 2021-04-06 RX ADMIN — SODIUM CHLORIDE 250 ML: 9 INJECTION, SOLUTION INTRAVENOUS at 10:20

## 2021-04-06 RX ADMIN — SODIUM CHLORIDE, PRESERVATIVE FREE 10 ML: 5 INJECTION INTRAVENOUS at 09:20

## 2021-04-06 RX ADMIN — Medication 500 UNITS: at 12:40

## 2021-04-06 RX ADMIN — SODIUM CHLORIDE, PRESERVATIVE FREE 10 ML: 5 INJECTION INTRAVENOUS at 12:40

## 2021-04-06 NOTE — DISCHARGE SUMMARY
Tolerated Transfusion well. Reviewed discharge instructions, understanding verbalized. Copies of AVS given to take home. Patient discharged home with friend. Down to exit per wheelchair.     Orders Placed This Encounter   Medications    sodium chloride flush 0.9 % injection 10 mL    heparin flush 100 UNIT/ML injection 500 Units    sodium chloride flush 0.9 % injection 10 mL    0.9 % sodium chloride infusion 250 mL

## 2021-04-06 NOTE — PLAN OF CARE
To unit room 4 for Blood Transfusion. Reorientated to unit. Procedure and plan of care explained. Questions answered. Understanding verbalized.

## 2021-04-07 ENCOUNTER — APPOINTMENT (OUTPATIENT)
Dept: CT IMAGING | Age: 26
End: 2021-04-07
Payer: COMMERCIAL

## 2021-04-07 ENCOUNTER — HOSPITAL ENCOUNTER (EMERGENCY)
Age: 26
Discharge: ANOTHER HOSPITAL | End: 2021-04-07
Payer: COMMERCIAL

## 2021-04-07 VITALS
WEIGHT: 170 LBS | SYSTOLIC BLOOD PRESSURE: 102 MMHG | RESPIRATION RATE: 16 BRPM | HEART RATE: 93 BPM | DIASTOLIC BLOOD PRESSURE: 61 MMHG | HEIGHT: 76 IN | TEMPERATURE: 98.4 F | BODY MASS INDEX: 20.7 KG/M2 | OXYGEN SATURATION: 99 %

## 2021-04-07 DIAGNOSIS — K56.609 SBO (SMALL BOWEL OBSTRUCTION) (HCC): Primary | ICD-10-CM

## 2021-04-07 DIAGNOSIS — D72.829 LEUKOCYTOSIS, UNSPECIFIED TYPE: ICD-10-CM

## 2021-04-07 DIAGNOSIS — Z85.72 H/O NON-HODGKIN'S LYMPHOMA: ICD-10-CM

## 2021-04-07 LAB
ABO/RH: NORMAL
ALBUMIN SERPL-MCNC: 3.6 GM/DL (ref 3.4–5)
ALP BLD-CCNC: 79 IU/L (ref 40–128)
ALT SERPL-CCNC: 17 U/L (ref 10–40)
ANION GAP SERPL CALCULATED.3IONS-SCNC: 9 MMOL/L (ref 4–16)
ANISOCYTOSIS: ABNORMAL
ANTIBODY SCREEN: NEGATIVE
AST SERPL-CCNC: 15 IU/L (ref 15–37)
BANDED NEUTROPHILS ABSOLUTE COUNT: 8.96 K/CU MM
BANDED NEUTROPHILS RELATIVE PERCENT: 28 % (ref 5–11)
BETA-HYDROXYBUTYRATE: 1.6 MG/DL (ref 0–3)
BILIRUB SERPL-MCNC: 0.2 MG/DL (ref 0–1)
BUN BLDV-MCNC: 6 MG/DL (ref 6–23)
CALCIUM SERPL-MCNC: 9.2 MG/DL (ref 8.3–10.6)
CHLORIDE BLD-SCNC: 101 MMOL/L (ref 99–110)
CO2: 27 MMOL/L (ref 21–32)
COMPONENT: NORMAL
COMPONENT: NORMAL
CREAT SERPL-MCNC: 0.8 MG/DL (ref 0.9–1.3)
CROSSMATCH RESULT: NORMAL
CROSSMATCH RESULT: NORMAL
DIFFERENTIAL TYPE: ABNORMAL
GFR AFRICAN AMERICAN: >60 ML/MIN/1.73M2
GFR NON-AFRICAN AMERICAN: >60 ML/MIN/1.73M2
GLUCOSE BLD-MCNC: 156 MG/DL (ref 70–99)
GONADOTROPIN, CHORIONIC (HCG) QUANT: 0.5 UIU/ML
HCT VFR BLD CALC: 27.5 % (ref 42–52)
HEMOGLOBIN: 9.2 GM/DL (ref 13.5–18)
LIPASE: 71 IU/L (ref 13–60)
LYMPHOCYTES ABSOLUTE: 2.2 K/CU MM
LYMPHOCYTES RELATIVE PERCENT: 7 % (ref 24–44)
MCH RBC QN AUTO: 27.3 PG (ref 27–31)
MCHC RBC AUTO-ENTMCNC: 33.5 % (ref 32–36)
MCV RBC AUTO: 81.6 FL (ref 78–100)
METAMYELOCYTES ABSOLUTE COUNT: 2.88 K/CU MM
METAMYELOCYTES PERCENT: 9 %
MONOCYTES ABSOLUTE: 2.6 K/CU MM
MONOCYTES RELATIVE PERCENT: 8 % (ref 0–4)
MYELOCYTE PERCENT: 4 %
MYELOCYTES ABSOLUTE COUNT: 1.28 K/CU MM
NUCLEATED RED BLOOD CELLS: 1
OTHER CELL MORPHOLOGY: ABNORMAL
PDW BLD-RTO: 15.1 % (ref 11.7–14.9)
PLATELET # BLD: 140 K/CU MM (ref 140–440)
PMV BLD AUTO: 9.8 FL (ref 7.5–11.1)
POLYCHROMASIA: ABNORMAL
POTASSIUM SERPL-SCNC: 3.8 MMOL/L (ref 3.5–5.1)
RBC # BLD: 3.37 M/CU MM (ref 4.6–6.2)
SEGMENTED NEUTROPHILS ABSOLUTE COUNT: 13.8 K/CU MM
SEGMENTED NEUTROPHILS RELATIVE PERCENT: 43 % (ref 36–66)
SODIUM BLD-SCNC: 137 MMOL/L (ref 135–145)
STATUS: NORMAL
STATUS: NORMAL
TOTAL PROTEIN: 6.3 GM/DL (ref 6.4–8.2)
TOXIC GRANULATION: PRESENT
TRANSFUSION STATUS: NORMAL
TRANSFUSION STATUS: NORMAL
UNDIFFERENTIATED CELL: 1 %
UNIT DIVISION: 0
UNIT DIVISION: 0
UNIT NUMBER: NORMAL
UNIT NUMBER: NORMAL
WBC # BLD: 32 K/CU MM (ref 4–10.5)

## 2021-04-07 PROCEDURE — 2580000003 HC RX 258: Performed by: PHYSICIAN ASSISTANT

## 2021-04-07 PROCEDURE — 6360000002 HC RX W HCPCS: Performed by: PHYSICIAN ASSISTANT

## 2021-04-07 PROCEDURE — 96375 TX/PRO/DX INJ NEW DRUG ADDON: CPT

## 2021-04-07 PROCEDURE — 96374 THER/PROPH/DIAG INJ IV PUSH: CPT

## 2021-04-07 PROCEDURE — 85027 COMPLETE CBC AUTOMATED: CPT

## 2021-04-07 PROCEDURE — 99283 EMERGENCY DEPT VISIT LOW MDM: CPT

## 2021-04-07 PROCEDURE — 82805 BLOOD GASES W/O2 SATURATION: CPT

## 2021-04-07 PROCEDURE — 83690 ASSAY OF LIPASE: CPT

## 2021-04-07 PROCEDURE — 85007 BL SMEAR W/DIFF WBC COUNT: CPT

## 2021-04-07 PROCEDURE — 82010 KETONE BODYS QUAN: CPT

## 2021-04-07 PROCEDURE — 74177 CT ABD & PELVIS W/CONTRAST: CPT

## 2021-04-07 PROCEDURE — 80053 COMPREHEN METABOLIC PANEL: CPT

## 2021-04-07 PROCEDURE — 6360000004 HC RX CONTRAST MEDICATION: Performed by: PHYSICIAN ASSISTANT

## 2021-04-07 PROCEDURE — 84702 CHORIONIC GONADOTROPIN TEST: CPT

## 2021-04-07 RX ORDER — ONDANSETRON 2 MG/ML
4 INJECTION INTRAMUSCULAR; INTRAVENOUS EVERY 30 MIN PRN
Status: DISCONTINUED | OUTPATIENT
Start: 2021-04-07 | End: 2021-04-07 | Stop reason: HOSPADM

## 2021-04-07 RX ORDER — HYDROMORPHONE HCL 110MG/55ML
1 PATIENT CONTROLLED ANALGESIA SYRINGE INTRAVENOUS EVERY 30 MIN PRN
Status: DISCONTINUED | OUTPATIENT
Start: 2021-04-07 | End: 2021-04-07 | Stop reason: HOSPADM

## 2021-04-07 RX ORDER — 0.9 % SODIUM CHLORIDE 0.9 %
1000 INTRAVENOUS SOLUTION INTRAVENOUS ONCE
Status: COMPLETED | OUTPATIENT
Start: 2021-04-07 | End: 2021-04-07

## 2021-04-07 RX ORDER — MORPHINE SULFATE 4 MG/ML
4 INJECTION, SOLUTION INTRAMUSCULAR; INTRAVENOUS EVERY 30 MIN PRN
Status: DISCONTINUED | OUTPATIENT
Start: 2021-04-07 | End: 2021-04-07 | Stop reason: HOSPADM

## 2021-04-07 RX ADMIN — IOPAMIDOL 75 ML: 755 INJECTION, SOLUTION INTRAVENOUS at 03:44

## 2021-04-07 RX ADMIN — ONDANSETRON 4 MG: 2 INJECTION INTRAMUSCULAR; INTRAVENOUS at 02:46

## 2021-04-07 RX ADMIN — MORPHINE SULFATE 4 MG: 4 INJECTION, SOLUTION INTRAMUSCULAR; INTRAVENOUS at 02:45

## 2021-04-07 RX ADMIN — SODIUM CHLORIDE 1000 ML: 9 INJECTION, SOLUTION INTRAVENOUS at 02:36

## 2021-04-07 RX ADMIN — HYDROMORPHONE HYDROCHLORIDE 1 MG: 2 INJECTION, SOLUTION INTRAMUSCULAR; INTRAVENOUS; SUBCUTANEOUS at 03:59

## 2021-04-07 ASSESSMENT — PAIN SCALES - GENERAL
PAINLEVEL_OUTOF10: 3
PAINLEVEL_OUTOF10: 7

## 2021-04-07 NOTE — ED PROVIDER NOTES
Occupational History    Not on file   Social Needs    Financial resource strain: Not hard at all   [place] insecurity     Worry: Never true     Inability: Never true   [employer] needs     Medical: No     Non-medical: No   Tobacco Use    Smoking status: Current Every Day Smoker     Packs/day: 0.75     Years: 5.00     Pack years: 3.75     Types: Cigarettes     Start date: 2016    Smokeless tobacco: Never Used   Substance and Sexual Activity    Alcohol use: No    Drug use: No    Sexual activity: Yes   Lifestyle    Physical activity     Days per week: Not on file     Minutes per session: Not on file    Stress: Not on file   Relationships    Social connections     Talks on phone: Not on file     Gets together: Not on file     Attends L[employer]-day service: Not on file     Active member of club or organization: Not on file     Attends meetings of clubs or organizations: Not on file     Relationship status: Not on file    Intimate partner violence     Fear of current or ex partner: Not on file     Emotionally abused: Not on file     Physically abused: Not on file     Forced sexual activity: Not on file   Other Topics Concern    Not on file   Social History Narrative    Not on file     Current Facility-Administered Medications   Medication Dose Route Frequency Provider Last Rate Last Admin    morphine sulfate (PF) injection 4 mg  4 mg Intravenous Q30 Min PRN Radha Broom, PA-C   4 mg at 04/07/21 0245    ondansetron (ZOFRAN) injection 4 mg  4 mg Intravenous Q30 Min PRN Radha Broom, PA-C   4 mg at 04/07/21 0246    HYDROmorphone (DILAUDID) injection 1 mg  1 mg Intravenous Q30 Min PRN Radha Broom, PA-C   1 mg at 04/07/21 0359    vancomycin (VANCOCIN) 1,500 mg in dextrose 5 % 500 mL IVPB  1,500 mg Intravenous Once Radha Broom, PA-C         Current Outpatient Medications   Medication Sig Dispense Refill    pantoprazole (PROTONIX) 40 MG tablet Take 40 mg by mouth daily      senna (SENOKOT) 8.6 MG tablet Take 1 tablet by mouth 2 times daily      fluconazole (DIFLUCAN) 200 MG tablet Take 200 mg by mouth daily      levoFLOXacin (LEVAQUIN) 500 MG tablet Take 500 mg by mouth daily      prochlorperazine (COMPAZINE) 10 MG tablet Take 10 mg by mouth every 6 hours as needed      sulfamethoxazole-trimethoprim (BACTRIM DS;SEPTRA DS) 800-160 MG per tablet Take 1 tablet by mouth once      valACYclovir (VALTREX) 500 MG tablet Take 500 mg by mouth daily      ondansetron (ZOFRAN) 4 MG tablet Take 4 mg by mouth every 8 hours as needed for Nausea or Vomiting      escitalopram (LEXAPRO) 10 MG tablet Take 1 tablet by mouth daily 90 tablet 1    insulin aspart (NOVOLOG FLEXPEN) 100 UNIT/ML injection pen Sliding scale ( pt unsure units) 5 pen 1    metFORMIN (GLUCOPHAGE-XR) 500 MG extended release tablet Take by mouth 2 times daily (with meals)       naproxen (NAPROSYN) 500 MG tablet Take 1 tablet by mouth 2 times daily 60 tablet 0    acetaminophen (APAP EXTRA STRENGTH) 500 MG tablet Take 1 tablet by mouth every 6 hours as needed for Pain 30 tablet 0     No Known Allergies    Nursing Notes Reviewed    Physical Exam:  ED Triage Vitals [04/07/21 0139]   Enc Vitals Group      /83      Pulse 78      Resp 17      Temp       Temp src       SpO2 97 %      Weight       Height       Head Circumference       Peak Flow       Pain Score       Pain Loc       Pain Edu? Excl. in [address]? General :Patient is awake alert oriented person place and time no acute distress nontoxic appearing  HEENT: Pupils are equally round and reactive to light extraocular motors are intact conjunctivae clear sclerae white there is no injection no icterus. Nose without any rhinorrhea or epistaxis. Oral mucosa is moist no exudate buccal mucosa shows no ulcerations.  Uvula is midline    Neck: Neck is supple full range of motion trachea midline thyroid nonpalpable  Cardiac: Heart regular rate rhythm no murmurs rubs clicks or gallops  Lungs: Lungs are clear to auscultation there is no wheezing rhonchi or rales. There is no use of accessory muscles no nasal flaring identified. Chest wall: There is no tenderness to palpation over the chest wall or over ribs  Abdomen: Abdomen is soft usually tender nondistended. There is no firm or pulsatile masses no rebound rigidity or guarding negative Lowry's negative McBurney, no peritoneal signs  Suprapubic:  there is no tenderness to palpation over the external bladder   Musculoskeletal: 5 out of 5 strength in all 4 extremities full flexion extension abduction and adduction supination pronation of all extremities and all digits. No obvious muscle atrophy is noted. No focal muscle deficits are appreciated  Dermatology: Skin is warm and dry there is no obvious abscesses lacerations or lesions noted  Psych: Mentation is grossly normal cognition is grossly normal. Affect is appropriate  Neuro: Motor intact sensory intact cranial nerves II through XII are intact level of consciousness is normal cerebellar function is normal reflexes are grossly normal. No evidence of incontinence or loss of bowel or bladder no saddle anesthesia noted Lymphatic: There is no submandibular or cervical adenopathy appreciated.         I have reviewed and interpreted all of the currently available lab results from this visit (if applicable):  Results for orders placed or performed during the hospital encounter of 04/07/21   CBC Auto Differential   Result Value Ref Range    WBC 32.0 (HH) 4.0 - 10.5 K/CU MM    RBC 3.37 (L) 4.6 - 6.2 M/CU MM    Hemoglobin 9.2 (L) 13.5 - 18.0 GM/DL    Hematocrit 27.5 (L) 42 - 52 %    MCV 81.6 78 - 100 FL    MCH 27.3 27 - 31 PG    MCHC 33.5 32.0 - 36.0 %    RDW 15.1 (H) 11.7 - 14.9 %    Platelets 515 125 - 024 K/CU MM    MPV 9.8 7.5 - 11.1 FL    UNDIFFERENTIATED CELL 1.0 (HH) 0.0 %    Myelocyte Percent 4 (H) 0.0 %    Metamyelocytes Relative 9 (H) 0.0 %    Bands Relative 28 (H) 5 - 11 %    Segs Relative 43.0 36 - 66 %    Lymphocytes % 7.0 (L) 24 - 44 %    Monocytes % 8.0 (H) 0 - 4 %    nRBC 1     Myelocytes Absolute 1.28 K/CU MM    Metamyelocytes Absolute 2.88 K/CU MM    Bands Absolute 8.96 K/CU MM    Segs Absolute 13.8 K/CU MM    Lymphocytes Absolute 2.2 K/CU MM    Monocytes Absolute 2.6 K/CU MM    Differential Type MANUAL DIFFERENTIAL     Anisocytosis 1+     Polychromasia 1+     Toxic Granulation PRESENT     Other Cell Morphology       1 ABNORMAL ATYPICAL CELL FORM ALSO NOTED WITH NUCLEOLI PRESENT   Comprehensive Metabolic Panel   Result Value Ref Range    Sodium 137 135 - 145 MMOL/L    Potassium 3.8 3.5 - 5.1 MMOL/L    Chloride 101 99 - 110 mMol/L    CO2 27 21 - 32 MMOL/L    BUN 6 6 - 23 MG/DL    CREATININE 0.8 (L) 0.9 - 1.3 MG/DL    Glucose 156 (H) 70 - 99 MG/DL    Calcium 9.2 8.3 - 10.6 MG/DL    Albumin 3.6 3.4 - 5.0 GM/DL    Total Protein 6.3 (L) 6.4 - 8.2 GM/DL    Total Bilirubin 0.2 0.0 - 1.0 MG/DL    ALT 17 10 - 40 U/L    AST 15 15 - 37 IU/L    Alkaline Phosphatase 79 40 - 128 IU/L    GFR Non-African American >60 >60 mL/min/1.73m2    GFR African American >60 >60 mL/min/1.73m2    Anion Gap 9 4 - 16   HCG, Quantitative, Pregnancy   Result Value Ref Range    hCG Quant 0.5 UIU/ML   Lipase   Result Value Ref Range    Lipase 71 (H) 13 - 60 IU/L   Beta-Hydroxybutyrate   Result Value Ref Range    Beta-Hydroxybutyrate 1.6 0.0 - 3.0 MG/DL      Radiographs (if obtained):  [] The following radiograph was interpreted by myself in the absence of a radiologist:   [] Radiologist's Report Reviewed:  CT ABDOMEN PELVIS W IV CONTRAST Additional Contrast? None   Preliminary Result   1. Focally dilated loop of small bowel is seen within the left hemiabdomen,   consistent with small bowel obstruction. There is a focal area of narrowing   of the small bowel loop in the mid abdomen, consistent with a transition   point with adjacent fluid and stranding. This could represent a stricture.    Further evaluation with oral contrast is recommended. 2. There is redemonstration of a mesenteric mass, consistent with patient's   history of lymphoma. This has decreased since the prior study. 3. There is a focal rim enhancing fluid collection within the pelvis   measuring 3.2 x 4.3 cm of uncertain etiology. Infected fluid collection   cannot be excluded. This is new since the prior exam and concerning for   abscess. 4. Stranding and fluid is seen within the mesentery with associated small   bowel wall thickening. Small amount of fluid is seen within the pelvis and   abdomen. EKG (if obtained):   Please See Note of attending physician for EKG interpretation. Chart review shows recent radiograph(s):  No results found. MDM:     Interventions given this visit:   Orders Placed This Encounter   Medications    morphine sulfate (PF) injection 4 mg    ondansetron (ZOFRAN) injection 4 mg    0.9 % sodium chloride bolus    HYDROmorphone (DILAUDID) injection 1 mg    iopamidol (ISOVUE-370) 76 % injection 75 mL    vancomycin (VANCOCIN) 1,500 mg in dextrose 5 % 500 mL IVPB     Order Specific Question:   Antimicrobial Indications     Answer:   Skin and Soft Tissue Infection       Hemodynamically stable, patient presents today for abdominal pain. Non emergent/non surgical abdominal exam.  Patient does have small bowel obstruction noted on the CT scan as well as the 3 x 4 cm area of concern for abscess broad-spectrum antibiotics are initiated here in the ED. Patient is made n.p.o. Analgesics well achieved today in the ED with morphine/Dilaudid. Fluids are provided IV. Patient has not vomited while here. NG tube is for forgode at this time. PAtient will be transferred to  [facility] for further care unavailable here at [facility]. Patient is stable and comfortable. Workup here in the ED is initiated.   Accepting physician for transfer is Dr. Ashley Valdivia I independently managed patient today in the ED      BP 117/69   Pulse 96   Temp 98.4 °F (36.9 °C) (Oral)   Resp 20   Ht 6' 4\" (1.93 m)   Wt 170 lb (77.1 kg)   SpO2 96%   BMI 20.69 kg/m²       Clinical Impression:  1. SBO (small bowel obstruction) (HCC)    2. Leukocytosis, unspecified type    3. H/O non-Hodgkin's lymphoma        Disposition referral (if applicable):  No follow-up provider specified. Disposition medications (if applicable):  New Prescriptions    No medications on file         Comment: Please note this report has been produced using speech recognition software and may contain errors related to that system including errors in grammar, punctuation, and spelling, as well as words and phrases that may be inappropriate. If there are any questions or concerns please feel free to contact the dictating provider for clarification.       Wero Mccann, [phone] [place] [address]  04/07/21 1860

## 2021-04-07 NOTE — ED NOTES
EMS reports sharp pain in left side. Pt had blood transfusion this morning, is being treated for cancer and is receiving chemo.  Reports some vomiting earlier      Aric Ashford  04/07/21 5270

## 2021-04-07 NOTE — ED NOTES
Pt is currently getting chemo, started in February. Most recent dose was last week.      [facility]  04/07/21 0142

## 2021-04-07 NOTE — ED NOTES
Bed: H-08  Expected date:   Expected time:   Means of arrival:   Comments:  EMS     Carolyn Toribio  04/07/21 0139

## 2021-04-07 NOTE — ED NOTES
The patient is resting. He is pale and is on the monitor. His mother is at the bedside. They are aware and are pleased with the plan to transfer. The IV SL is flushed and locked. Bedside report received.       Justin Dubon RN  04/07/21 2388

## 2021-04-07 NOTE — PROGRESS NOTES
Wood Mckinney is a 23 y.o. male started on vancomycin for skin and soft tissue infection. [facility] department. Other antimicrobials: none at this time    Ht Readings from Last 1 Encounters:   04/07/21 6' 4\" (1.93 m)     Wt Readings from Last 3 Encounters:   04/07/21 170 lb (77.1 kg)   04/06/21 173 lb (78.5 kg)   04/05/21 173 lb (78.5 kg)        Pertinent Laboratory Values:   Temp Readings from Last 3 Encounters:   04/07/21 98.4 °F (36.9 °C) (Oral)   04/06/21 97.5 °F (36.4 °C) (Infrared)   04/05/21 98.6 °F (37 °C)     Recent Labs     04/05/21  1439 04/06/21  0800 04/07/21  0250   WBC 18.5* 25.4* 32.0*     Recent Labs     04/05/21  1439 04/06/21  0800 04/07/21  0250   BUN 6 7 6   CREATININE 0.7* 0.7* 0.8*     Estimated Creatinine Clearance: 157 mL/min (A) (based on SCr of 0.8 mg/dL (L)). No intake or output data in the 24 hours ending 04/07/21 0554    Assessment/Plan:  Pharmacy will order vancomycin 1500 mg (19.5 mg/kg). Please note, pharmacy will order a one-time dose of vancomycin for the Emergency Department. The consult will need to be re-ordered if vancomycin is to continue upon admission. Thank you for the consult.   Richardson Mcardle, nathaniel  4/7/2021 5:54 AM

## 2021-04-17 ENCOUNTER — APPOINTMENT (OUTPATIENT)
Dept: CT IMAGING | Age: 26
End: 2021-04-17
Payer: COMMERCIAL

## 2021-04-17 ENCOUNTER — APPOINTMENT (OUTPATIENT)
Dept: GENERAL RADIOLOGY | Age: 26
End: 2021-04-17
Payer: COMMERCIAL

## 2021-04-17 ENCOUNTER — HOSPITAL ENCOUNTER (EMERGENCY)
Age: 26
Discharge: ANOTHER HOSPITAL | End: 2021-04-17
Attending: EMERGENCY MEDICINE
Payer: COMMERCIAL

## 2021-04-17 VITALS
SYSTOLIC BLOOD PRESSURE: 122 MMHG | WEIGHT: 175 LBS | OXYGEN SATURATION: 97 % | HEART RATE: 84 BPM | DIASTOLIC BLOOD PRESSURE: 71 MMHG | BODY MASS INDEX: 21.3 KG/M2 | RESPIRATION RATE: 27 BRPM | TEMPERATURE: 97.6 F

## 2021-04-17 DIAGNOSIS — J98.2 PNEUMOMEDIASTINUM (HCC): ICD-10-CM

## 2021-04-17 DIAGNOSIS — K66.8 PERITONEAL CAVITY FREE AIR: ICD-10-CM

## 2021-04-17 DIAGNOSIS — R10.9 ABDOMINAL PAIN, UNSPECIFIED ABDOMINAL LOCATION: Primary | ICD-10-CM

## 2021-04-17 LAB
ALBUMIN SERPL-MCNC: 3.2 GM/DL (ref 3.4–5)
ALP BLD-CCNC: 65 IU/L (ref 40–129)
ALT SERPL-CCNC: 37 U/L (ref 10–40)
ANION GAP SERPL CALCULATED.3IONS-SCNC: 13 MMOL/L (ref 4–16)
ANISOCYTOSIS: ABNORMAL
AST SERPL-CCNC: 41 IU/L (ref 15–37)
BACTERIA: ABNORMAL /HPF
BANDED NEUTROPHILS ABSOLUTE COUNT: 5.33 K/CU MM
BANDED NEUTROPHILS RELATIVE PERCENT: 13 % (ref 5–11)
BASOPHILS ABSOLUTE: 0.4 K/CU MM
BASOPHILS RELATIVE PERCENT: 1 % (ref 0–1)
BILIRUB SERPL-MCNC: 0.2 MG/DL (ref 0–1)
BILIRUBIN URINE: NEGATIVE MG/DL
BLOOD, URINE: NEGATIVE
BUN BLDV-MCNC: 11 MG/DL (ref 6–23)
CALCIUM SERPL-MCNC: 8.6 MG/DL (ref 8.3–10.6)
CHLORIDE BLD-SCNC: 98 MMOL/L (ref 99–110)
CLARITY: CLEAR
CO2: 22 MMOL/L (ref 21–32)
COLOR: YELLOW
CREAT SERPL-MCNC: 0.7 MG/DL (ref 0.9–1.3)
DIFFERENTIAL TYPE: ABNORMAL
GFR AFRICAN AMERICAN: >60 ML/MIN/1.73M2
GFR NON-AFRICAN AMERICAN: >60 ML/MIN/1.73M2
GLUCOSE BLD-MCNC: 359 MG/DL (ref 70–99)
GLUCOSE, URINE: >500 MG/DL
HCT VFR BLD CALC: 34.2 % (ref 42–52)
HEMOGLOBIN: 10.9 GM/DL (ref 13.5–18)
KETONES, URINE: ABNORMAL MG/DL
LACTATE: 1.9 MMOL/L (ref 0.4–2)
LEUKOCYTE ESTERASE, URINE: ABNORMAL
LIPASE: 130 IU/L (ref 13–60)
LYMPHOCYTES ABSOLUTE: 0.8 K/CU MM
LYMPHOCYTES RELATIVE PERCENT: 2 % (ref 24–44)
MCH RBC QN AUTO: 28.1 PG (ref 27–31)
MCHC RBC AUTO-ENTMCNC: 31.9 % (ref 32–36)
MCV RBC AUTO: 88.1 FL (ref 78–100)
METAMYELOCYTES ABSOLUTE COUNT: 0.41 K/CU MM
METAMYELOCYTES PERCENT: 1 %
MICROCYTES: ABNORMAL
MONOCYTES ABSOLUTE: 0.8 K/CU MM
MONOCYTES RELATIVE PERCENT: 2 % (ref 0–4)
MUCUS: ABNORMAL HPF
NITRITE URINE, QUANTITATIVE: NEGATIVE
PDW BLD-RTO: 19.1 % (ref 11.7–14.9)
PH, URINE: 5 (ref 5–8)
PLATELET # BLD: 568 K/CU MM (ref 140–440)
PLT MORPHOLOGY: ABNORMAL
PMV BLD AUTO: 8.7 FL (ref 7.5–11.1)
POLYCHROMASIA: ABNORMAL
POTASSIUM SERPL-SCNC: 3.8 MMOL/L (ref 3.5–5.1)
PROTEIN UA: NEGATIVE MG/DL
RBC # BLD: 3.88 M/CU MM (ref 4.6–6.2)
RBC URINE: 2 /HPF (ref 0–3)
SEGMENTED NEUTROPHILS ABSOLUTE COUNT: 33.3 K/CU MM
SEGMENTED NEUTROPHILS RELATIVE PERCENT: 81 % (ref 36–66)
SODIUM BLD-SCNC: 133 MMOL/L (ref 135–145)
SPECIFIC GRAVITY UA: 1.03 (ref 1–1.03)
TOTAL PROTEIN: 6 GM/DL (ref 6.4–8.2)
TRICHOMONAS: ABNORMAL /HPF
UROBILINOGEN, URINE: NEGATIVE MG/DL (ref 0.2–1)
WBC # BLD: 41 K/CU MM (ref 4–10.5)
WBC UA: 4 /HPF (ref 0–2)

## 2021-04-17 PROCEDURE — 83605 ASSAY OF LACTIC ACID: CPT

## 2021-04-17 PROCEDURE — 85027 COMPLETE CBC AUTOMATED: CPT

## 2021-04-17 PROCEDURE — 96375 TX/PRO/DX INJ NEW DRUG ADDON: CPT

## 2021-04-17 PROCEDURE — 99285 EMERGENCY DEPT VISIT HI MDM: CPT

## 2021-04-17 PROCEDURE — 80053 COMPREHEN METABOLIC PANEL: CPT

## 2021-04-17 PROCEDURE — 96374 THER/PROPH/DIAG INJ IV PUSH: CPT

## 2021-04-17 PROCEDURE — 2580000003 HC RX 258: Performed by: EMERGENCY MEDICINE

## 2021-04-17 PROCEDURE — 83690 ASSAY OF LIPASE: CPT

## 2021-04-17 PROCEDURE — 71046 X-RAY EXAM CHEST 2 VIEWS: CPT

## 2021-04-17 PROCEDURE — 36415 COLL VENOUS BLD VENIPUNCTURE: CPT

## 2021-04-17 PROCEDURE — 6360000002 HC RX W HCPCS: Performed by: PHYSICIAN ASSISTANT

## 2021-04-17 PROCEDURE — 6360000004 HC RX CONTRAST MEDICATION: Performed by: PHYSICIAN ASSISTANT

## 2021-04-17 PROCEDURE — 74177 CT ABD & PELVIS W/CONTRAST: CPT

## 2021-04-17 PROCEDURE — 2580000003 HC RX 258: Performed by: PHYSICIAN ASSISTANT

## 2021-04-17 PROCEDURE — 6360000002 HC RX W HCPCS: Performed by: EMERGENCY MEDICINE

## 2021-04-17 PROCEDURE — 85007 BL SMEAR W/DIFF WBC COUNT: CPT

## 2021-04-17 PROCEDURE — 81001 URINALYSIS AUTO W/SCOPE: CPT

## 2021-04-17 PROCEDURE — 96376 TX/PRO/DX INJ SAME DRUG ADON: CPT

## 2021-04-17 RX ORDER — ONDANSETRON 2 MG/ML
4 INJECTION INTRAMUSCULAR; INTRAVENOUS EVERY 6 HOURS PRN
Status: DISCONTINUED | OUTPATIENT
Start: 2021-04-17 | End: 2021-04-17 | Stop reason: HOSPADM

## 2021-04-17 RX ORDER — 0.9 % SODIUM CHLORIDE 0.9 %
1000 INTRAVENOUS SOLUTION INTRAVENOUS ONCE
Status: COMPLETED | OUTPATIENT
Start: 2021-04-17 | End: 2021-04-17

## 2021-04-17 RX ORDER — ONDANSETRON 2 MG/ML
4 INJECTION INTRAMUSCULAR; INTRAVENOUS ONCE
Status: COMPLETED | OUTPATIENT
Start: 2021-04-17 | End: 2021-04-17

## 2021-04-17 RX ORDER — MORPHINE SULFATE 4 MG/ML
4 INJECTION, SOLUTION INTRAMUSCULAR; INTRAVENOUS
Status: DISCONTINUED | OUTPATIENT
Start: 2021-04-17 | End: 2021-04-17 | Stop reason: HOSPADM

## 2021-04-17 RX ADMIN — MORPHINE SULFATE 4 MG: 4 INJECTION, SOLUTION INTRAMUSCULAR; INTRAVENOUS at 14:21

## 2021-04-17 RX ADMIN — SODIUM CHLORIDE 1000 ML: 9 INJECTION, SOLUTION INTRAVENOUS at 11:38

## 2021-04-17 RX ADMIN — ONDANSETRON 4 MG: 2 INJECTION INTRAMUSCULAR; INTRAVENOUS at 11:38

## 2021-04-17 RX ADMIN — PIPERACILLIN AND TAZOBACTAM 3375 MG: 3; .375 INJECTION, POWDER, LYOPHILIZED, FOR SOLUTION INTRAVENOUS at 16:23

## 2021-04-17 RX ADMIN — ONDANSETRON 4 MG: 2 INJECTION INTRAMUSCULAR; INTRAVENOUS at 12:46

## 2021-04-17 RX ADMIN — SODIUM CHLORIDE 1000 ML: 9 INJECTION, SOLUTION INTRAVENOUS at 14:22

## 2021-04-17 RX ADMIN — IOPAMIDOL 75 ML: 755 INJECTION, SOLUTION INTRAVENOUS at 13:11

## 2021-04-17 RX ADMIN — MORPHINE SULFATE 4 MG: 4 INJECTION, SOLUTION INTRAMUSCULAR; INTRAVENOUS at 12:46

## 2021-04-17 NOTE — ED NOTES
Report received from [facility]. Patient resting comfortably in bed. Vital signs stable. Resps even and unlabored.       Nilton Núñez RN  04/17/21 0197

## 2021-04-17 NOTE — ED PROVIDER NOTES
ATTENDING NOTE:    I discussed this patient's history and physical findings and reviewed the PA's findings with them, as well as performed an independent assessment and coordinated care with them. My additional findings are: Patient is resting in bed, appears fatigued, pale with family at bedside. Diffuse tenderness on palpation of the abdomen. Heart rate regular, lungs clear. He states that he started feeling unwell approximately 3 to 4 hours prior to arrival, that it is more severe than when he was here recently. HISTORY OF PRESENT ILLNESS:  Chief Complaint   Patient presents with    Abdominal Pain    Nausea   . Krzysztof Andres is a 23 y.o. male with a history of lymphoma followed by [facility] who presents with abdominal pain. He was recently seen and evaluated, he states he intermittently gets bowel obstructions that do not require surgery. Today he developed more intense pain which is diffuse but greatest in the upper abdomen. It is rated as cramping and sharp. PHYSICAL EXAM:  VITAL SIGNS:   ED Triage Vitals   Enc Vitals Group      BP 04/17/21 1116 128/77      Pulse 04/17/21 1116 99      Resp 04/17/21 1116 16      Temp 04/17/21 1117 97.6 °F (36.4 °C)      Temp Source 04/17/21 1117 Oral      SpO2 04/17/21 1116 96 %      Weight --       Height --       Head Circumference --       Peak Flow --       Pain Score --       Pain Loc --       Pain Edu? --       Excl. in [address] during ED course were reviewed and are as charted.     Key Physical Exam Findings:    Constitutional: Chronically unwell in appearance, pale    Eyes:  Conjunctiva normal, No discharge    HENT: Normocephalic, Atraumatic, Bilateral external ears normal, posterior oropharynx is nonerythematous and without exudate, uvula is midline, no trismus, no \"hot potato voice\" or dysphonia, oropharynx moist    Neck: Supple, no stridor, no grossly visible or palpable masses    Cardiovascular: Regular rate and rhythm, No murmurs, No rubs, No gallops    Pulmonary/Chest:  Normal breath sounds, No respiratory distress or accessory muscle use, No wheezing, crackles or rhonchi. Abdomen: Diffuse tenderness with guarding     back:  No midline point tenderness, No paraspinous muscle tenderness.   No CVA tenderness    Extremities:  No gross deformities, no edema, no tenderness    Neurologic:  Normal motor function, Normal sensory function, No focal deficits    Skin:  Warm, Dry, No erythema, No rash, No cyanosis, No mottling    Lymphatic:  No lymphadenopathy in the following location(s): cervical    Psychiatric:  Alert and oriented x3, Affect normal          RADIOLOGY/PROCEDURES/LABS/MEDICATIONS ADMINISTERED:    I have reviewed and interpreted all of the currently available lab results from this visit (if applicable):  Results for orders placed or performed during the hospital encounter of 04/17/21   CBC auto diff   Result Value Ref Range    WBC 41.0 (HH) 4.0 - 10.5 K/CU MM    RBC 3.88 (L) 4.6 - 6.2 M/CU MM    Hemoglobin 10.9 (L) 13.5 - 18.0 GM/DL    Hematocrit 34.2 (L) 42 - 52 %    MCV 88.1 78 - 100 FL    MCH 28.1 27 - 31 PG    MCHC 31.9 (L) 32.0 - 36.0 %    RDW 19.1 (H) 11.7 - 14.9 %    Platelets 111 (H) 596 - 440 K/CU MM    MPV 8.7 7.5 - 11.1 FL    Metamyelocytes Relative 1 (H) 0.0 %    Bands Relative 13 (H) 5 - 11 %    Segs Relative 81.0 (H) 36 - 66 %    Basophils % 1.0 0 - 1 %    Lymphocytes % 2.0 (L) 24 - 44 %    Monocytes % 2.0 0 - 4 %    Metamyelocytes Absolute 0.41 K/CU MM    Bands Absolute 5.33 K/CU MM    Segs Absolute 33.3 K/CU MM    Basophils Absolute 0.4 K/CU MM    Lymphocytes Absolute 0.8 K/CU MM    Monocytes Absolute 0.8 K/CU MM    Differential Type MANUAL DIFFERENTIAL     Anisocytosis 2+     Microcytes 1+     Polychromasia 1+     PLT Morphology RARE    CMP   Result Value Ref Range    Sodium 133 (L) 135 - 145 MMOL/L    Potassium 3.8 3.5 - 5.1 MMOL/L    Chloride 98 (L) 99 - 110 mMol/L    CO2 22 21 - 32 MMOL/L    BUN 11 6 - 23 MG/DL CREATININE 0.7 (L) 0.9 - 1.3 MG/DL    Glucose 359 (H) 70 - 99 MG/DL    Calcium 8.6 8.3 - 10.6 MG/DL    Albumin 3.2 (L) 3.4 - 5.0 GM/DL    Total Protein 6.0 (L) 6.4 - 8.2 GM/DL    Total Bilirubin 0.2 0.0 - 1.0 MG/DL    ALT 37 10 - 40 U/L    AST 41 (H) 15 - 37 IU/L    Alkaline Phosphatase 65 40 - 129 IU/L    GFR Non-African American >60 >60 mL/min/1.73m2    GFR African American >60 >60 mL/min/1.73m2    Anion Gap 13 4 - 16   Lipase   Result Value Ref Range    Lipase 130 (H) 13 - 60 IU/L   Urinalysis   Result Value Ref Range    Color, UA YELLOW YELLOW    Clarity, UA CLEAR CLEAR    Glucose, Urine >500 (A) NEGATIVE MG/DL    Bilirubin Urine NEGATIVE NEGATIVE MG/DL    Ketones, Urine MODERATE (A) NEGATIVE MG/DL    Specific Gravity, UA 1.026 1.001 - 1.035    Blood, Urine NEGATIVE NEGATIVE    pH, Urine 5.0 5.0 - 8.0    Protein, UA NEGATIVE NEGATIVE MG/DL    Urobilinogen, Urine NEGATIVE 0.2 - 1.0 MG/DL    Nitrite Urine, Quantitative NEGATIVE NEGATIVE    Leukocyte Esterase, Urine TRACE (A) NEGATIVE    RBC, UA 2 0 - 3 /HPF    WBC, UA 4 (H) 0 - 2 /HPF    Bacteria, UA RARE (A) NEGATIVE /HPF    Mucus, UA RARE (A) NEGATIVE HPF    Trichomonas, UA NONE SEEN NONE SEEN /HPF   Lactic Acid, Plasma   Result Value Ref Range    Lactate 1.9 0.4 - 2.0 mMOL/L          ABNORMAL LABS:  Labs Reviewed   CBC WITH AUTO DIFFERENTIAL - Abnormal; Notable for the following components:       Result Value    WBC 41.0 (*)     RBC 3.88 (*)     Hemoglobin 10.9 (*)     Hematocrit 34.2 (*)     MCHC 31.9 (*)     RDW 19.1 (*)     Platelets 340 (*)     Metamyelocytes Relative 1 (*)     Bands Relative 13 (*)     Segs Relative 81.0 (*)     Lymphocytes % 2.0 (*)     All other components within normal limits   COMPREHENSIVE METABOLIC PANEL - Abnormal; Notable for the following components:    Sodium 133 (*)     Chloride 98 (*)     CREATININE 0.7 (*)     Glucose 359 (*)     Albumin 3.2 (*)     Total Protein 6.0 (*)     AST 41 (*)     All other components within normal limits   LIPASE - Abnormal; Notable for the following components:    Lipase 130 (*)     All other components within normal limits   URINALYSIS - Abnormal; Notable for the following components:    Glucose, Urine >500 (*)     Ketones, Urine MODERATE (*)     Leukocyte Esterase, Urine TRACE (*)     WBC, UA 4 (*)     Bacteria, UA RARE (*)     Mucus, UA RARE (*)     All other components within normal limits   COVID-19, RAPID   LACTIC ACID, PLASMA         IMAGING STUDIES ORDERED:  CT ABDOMEN PELVIS W IV CONTRAST  XR CHEST (2 VW)  IP CONSULT TO INTERNAL MEDICINE    I have personally viewed the imaging studies. The radiologist interpretation is:   XR CHEST (2 VW)   Preliminary Result   Question of gas in the subcutaneous tissues of the lower neck and extending   into left shoulder region. Possible pneumomediastinum is raised. The   questioned left-sided pneumothorax raised on CT is not appreciated on the   conventional radiograph. If further imaging is deemed warranted chest CT is   recommended. CT ABDOMEN PELVIS W IV CONTRAST Additional Contrast? None   Preliminary Result   Intra-abdominal free air, suspicious for hollow viscus perforation. There is   marked thickening of the proximal jejunal loop in the left upper quadrant   with adjacent inflammatory changes and adjacent free air which may reflect   site of perforation. Left-sided pneumothorax, partially visualized on this study. Question of   small gas density seen about the distal esophagus and gastroesophageal   junction which may reflect underlying pneumomediastinum. Redemonstration of 1.1 cm hypodense lesion in the pancreatic tail as seen on   the prior study. Small intra-abdominal/intrapelvic ascites. Redemonstration of 3.7 x 2.7 cm peripherally enhancing fluid collection seen   within the posterior aspect of the pelvis as seen on the prior study. This   may reflect infected fluid collection. Urinary bladder wall thickening. Correlate clinically with signs of cystitis. Fluid seen within the colon which may reflect underlying diarrheal disease. These findings were discussed with ESTER Fallon at 1:50 p.m. on 04/17/2021. MEDICATIONS ADMINISTERED:  Medications   ondansetron (ZOFRAN) injection 4 mg (4 mg Intravenous Given 4/17/21 1138)   morphine sulfate (PF) injection 4 mg (4 mg Intravenous Given 4/17/21 1421)   0.9 % sodium chloride bolus (1,000 mLs Intravenous New Bag 4/17/21 1422)   piperacillin-tazobactam (ZOSYN) 3,375 mg in dextrose 5 % 50 mL IVPB (mini-bag) (has no administration in time range)   0.9 % sodium chloride bolus (0 mLs Intravenous Stopped 4/17/21 1421)   ondansetron (ZOFRAN) injection 4 mg (4 mg Intravenous Given 4/17/21 1246)   iopamidol (ISOVUE-370) 76 % injection 75 mL (75 mLs Intravenous Given 4/17/21 1311)         PLAN/ED COURSE:  Last Vitals: /77   Pulse 99   Temp 97.6 °F (36.4 °C) (Oral)   Resp 16   Wt 175 lb (79.4 kg)   SpO2 96%   BMI 21.30 kg/m²     23year-old male presents with abdominal pain, found to have free intraperitoneal air which increases concern for hollow viscus perforation. He started on broad-spectrum antibiotics. Patient and family request to be transferred to [facility] as he is an established patient there and has cancer specialist.  He was treated with pain/nausea medication and started on vancomycin and Zosyn. He will be transported emergently to [facility] ED, Dr. Jacquelyn Martínez Accepting physician    CRITICAL CARE NOTE:  There was a high probability of clinically significant life-threatening deterioration of the patient's condition requiring my urgent intervention due to hollow viscus perforation, pneumomediastinum, free air. Broad-spectrum antibiotics, arrangement of transfer, evaluation and reevaluation, review of imaging was performed to address this. Total critical care time is 32 minutes.     This includes vital sign monitoring, pulse oximetry monitoring, telemetry monitoring, clinical response to the IV medications, reviewing the nursing notes, consultation time, dictation/documentation time, and interpretation of the lab work. This time excludes time spent performing procedures and separately billable procedures and family discussion time. Clinical Impression:  1. Abdominal pain, unspecified abdominal location    2. Peritoneal cavity free air    3. Pneumomediastinum ([facility] [address]        Disposition referral (if applicable):  No follow-up provider specified.     Disposition medications (if applicable):  New Prescriptions    No medications on file       ED Provider Disposition Time  DISPOSITION Decision To Transfer 04/17/2021 01:59:58 PM            Electronically signed by Tiffany Kim MD on 4/17/2021 at 3:20 PM        Tiffany Kim MD  04/17/21 5677 Chastity Walker MD  04/17/21 4966

## 2021-04-17 NOTE — ED PROVIDER NOTES
EMERGENCY DEPARTMENT ENCOUNTER      PCP: Kye Fritz DO    CHIEF COMPLAINT    Chief Complaint   Patient presents with    Abdominal Pain    Nausea           Of note, this patient was also evaluated by the attending physician, Dr. Joanna Goodman MD.        HPI    Eloy South is a 23 y.o. male who presents with abdominal pain. Onset -approximately 4 hours  Location -lower abdominal region  Duration -since onset  Character -achy, painful  Aggravating/Alleviating factors -worse with direct palpation, no known alleviating factors  Associate symptoms -nausea  Radiation -none  Severity -moderate    Patient has stage III Burkitts non-Hodgkin's lymphoma and currently receives chemotherapy. Oncologist is in [address] [place] [address]    Constitutional:  Denies fever, chills, weight loss or weakness   HENT:  Denies sore throat or ear pain   Cardiovascular:  Denies chest pain, palpitations or swelling   Respiratory:  Denies cough or shortness of breath   GI:  See HPI above. : No hematuria or dysuria. No concern for STD. No testicle pain, swelling, or redness to scrotum. Musculoskeletal:  Denies back pain or groin pain or masses. No pain or swelling of extremities.   Skin:  Denies rash  Neurologic:  Denies headache, focal weakness or sensory changes   Endocrine:  Denies polyuria or polydypsia   Lymphatic:  Denies swollen glands     All other review of systems are negative  See HPI and nursing notes for additional information     PAST MEDICAL & SURGICAL HISTORY    Past Medical History:   Diagnosis Date    Asthma     Burkitt lymphoma ([address] 3/25/2021    Diabetes mellitus ([address]     High grade B-cell lymphoma ([address]     Unspecified B-cell lymphoma, unspecified site ([address] 3/29/2021     Past Surgical History:   Procedure Laterality Date    PORT SURGERY N/A 2/24/2021    PORT INSERTION performed by Valeria Ordonez MD at [facility] Refill    pantoprazole (PROTONIX) 40 MG tablet Take 40 mg by mouth daily      senna (SENOKOT) 8.6 MG tablet Take 1 tablet by mouth 2 times daily      fluconazole (DIFLUCAN) 200 MG tablet Take 200 mg by mouth daily      levoFLOXacin (LEVAQUIN) 500 MG tablet Take 500 mg by mouth daily      prochlorperazine (COMPAZINE) 10 MG tablet Take 10 mg by mouth every 6 hours as needed      sulfamethoxazole-trimethoprim (BACTRIM DS;SEPTRA DS) 800-160 MG per tablet Take 1 tablet by mouth once      valACYclovir (VALTREX) 500 MG tablet Take 500 mg by mouth daily      ondansetron (ZOFRAN) 4 MG tablet Take 4 mg by mouth every 8 hours as needed for Nausea or Vomiting      escitalopram (LEXAPRO) 10 MG tablet Take 1 tablet by mouth daily 90 tablet 1    insulin aspart (NOVOLOG FLEXPEN) 100 UNIT/ML injection pen Sliding scale ( pt unsure units) 5 pen 1    metFORMIN (GLUCOPHAGE-XR) 500 MG extended release tablet Take by mouth 2 times daily (with meals)       naproxen (NAPROSYN) 500 MG tablet Take 1 tablet by mouth 2 times daily 60 tablet 0    acetaminophen (APAP EXTRA STRENGTH) 500 MG tablet Take 1 tablet by mouth every 6 hours as needed for Pain 30 tablet 0       ALLERGIES    No Known Allergies    SOCIAL AND FAMILY HISTORY    Social History     Socioeconomic History    Marital status: Single     Spouse name: None    Number of children: None    Years of education: None    Highest education level: None   Occupational History    None   Social Needs    Financial resource strain: Not hard at all   [place] insecurity     Worry: Never true     Inability: Never true    Transportation needs     Medical: No     Non-medical: No   Tobacco Use    Smoking status: Current Every Day Smoker     Packs/day: 0.75     Years: 5.00     Pack years: 3.75     Types: Cigarettes     Start date: 2016    Smokeless tobacco: Never Used   Substance and Sexual Activity    Alcohol use: No    Drug use: No    Sexual activity: Yes   Lifestyle    Physical activity     Days per week: None     Minutes per session: None    Stress: None   Relationships    Social connections     Talks on phone: None     Gets together: None     Attends Gnosticism service: None     Active member of club or organization: None     Attends meetings of clubs or organizations: None     Relationship status: None    Intimate partner violence     Fear of current or ex partner: None     Emotionally abused: None     Physically abused: None     Forced sexual activity: None   Other Topics Concern    None   Social History Narrative    None     Family History   Problem Relation Age of Onset    Diabetes type 2  Father     Cancer Maternal Aunt     Diabetes type 2  Maternal Grandmother        PHYSICAL EXAM    VITAL SIGNS: /77   Pulse 99   Temp 97.6 °F (36.4 °C) (Oral)   Resp 16   Wt 175 lb (79.4 kg)   SpO2 96%   BMI 21.30 kg/m²   Constitutional:  Well developed, well nourished. No distress  Eyes:  Sclera nonicteric, conjunctiva moist  HENT:  Atraumatic. PERRL. EOMI.  + dry moist mucus membranes. Neck/Lymphatics: supple, no JVD, no swollen nodes  Respiratory:  No retractions, no accessory muscle use, normal breath sounds   Cardiovascular:  Rate 98, normal rhythm, no murmurs    GI:     No gross discoloration.       -no [place]'s sign (periumbilical ecchymosis)       -no Grey-Shay's sign (flank ecchymosis)    Bowel sounds present, no audible bruits. Soft,  No distention, no guarding, no rigidity,   + moderate lower abdominal tenderness, no rebound, no palpable pulsatile masses,   No McBurney's point tenderness   Negative Rovsing sign    Negative Lowry's sign. Back:   No CVA tenderness to percussion.   Musculoskeletal:  No edema, no deformity  Integument: No rash, dry skin  Neurologic:  Alert & oriented, normal speech  Psychiatric: Cooperative, pleasant affect       LABS:  Results for orders placed or performed during the hospital encounter of 04/17/21   CBC auto diff Result Value Ref Range    WBC 41.0 (HH) 4.0 - 10.5 K/CU MM    RBC 3.88 (L) 4.6 - 6.2 M/CU MM    Hemoglobin 10.9 (L) 13.5 - 18.0 GM/DL    Hematocrit 34.2 (L) 42 - 52 %    MCV 88.1 78 - 100 FL    MCH 28.1 27 - 31 PG    MCHC 31.9 (L) 32.0 - 36.0 %    RDW 19.1 (H) 11.7 - 14.9 %    MPV 8.7 7.5 - 11.1 FL   CMP   Result Value Ref Range    Sodium 133 (L) 135 - 145 MMOL/L    Potassium 3.8 3.5 - 5.1 MMOL/L    Chloride 98 (L) 99 - 110 mMol/L    CO2 22 21 - 32 MMOL/L    BUN 11 6 - 23 MG/DL    CREATININE 0.7 (L) 0.9 - 1.3 MG/DL    Glucose 359 (H) 70 - 99 MG/DL    Calcium 8.6 8.3 - 10.6 MG/DL    Albumin 3.2 (L) 3.4 - 5.0 GM/DL    Total Protein 6.0 (L) 6.4 - 8.2 GM/DL    Total Bilirubin 0.2 0.0 - 1.0 MG/DL    ALT 37 10 - 40 U/L    AST 41 (H) 15 - 37 IU/L    Alkaline Phosphatase 65 40 - 129 IU/L    GFR Non-African American >60 >60 mL/min/1.73m2    GFR African American >60 >60 mL/min/1.73m2    Anion Gap 13 4 - 16   Lipase   Result Value Ref Range    Lipase 130 (H) 13 - 60 IU/L   Urinalysis   Result Value Ref Range    Color, UA YELLOW YELLOW    Clarity, UA CLEAR CLEAR    Glucose, Urine >500 (A) NEGATIVE MG/DL    Bilirubin Urine NEGATIVE NEGATIVE MG/DL    Ketones, Urine MODERATE (A) NEGATIVE MG/DL    Specific Gravity, UA 1.026 1.001 - 1.035    Blood, Urine NEGATIVE NEGATIVE    pH, Urine 5.0 5.0 - 8.0    Protein, UA NEGATIVE NEGATIVE MG/DL    Urobilinogen, Urine NEGATIVE 0.2 - 1.0 MG/DL    Nitrite Urine, Quantitative NEGATIVE NEGATIVE    Leukocyte Esterase, Urine TRACE (A) NEGATIVE    RBC, UA 2 0 - 3 /HPF    WBC, UA 4 (H) 0 - 2 /HPF    Bacteria, UA RARE (A) NEGATIVE /HPF    Mucus, UA RARE (A) NEGATIVE HPF    Trichomonas, UA NONE SEEN NONE SEEN /HPF   Lactic Acid, Plasma   Result Value Ref Range    Lactate 1.9 0.4 - 2.0 mMOL/L           RADIOLOGY/PROCEDURES    CT ABDOMEN PELVIS W IV CONTRAST Additional Contrast? None   Preliminary Result   Intra-abdominal free air, suspicious for hollow viscus perforation.   There is   marked thickening of the proximal jejunal loop in the left upper quadrant   with adjacent inflammatory changes and adjacent free air, which may reflect   site of perforation. Left-sided pneumothorax, partially visualized on this study. Question of   small gas density seen about the distal esophagus and gastroesophageal   junction, which may reflect underlying pneumomediastinum. Redemonstration of 1.1 cm hypodense lesion in the pancreatic tail as seen on   the prior study. Small intra-abdominal/intrapelvic ascites. Redemonstration of 3.7 x 2.7 cm peripherally enhancing fluid collection seen   within the posterior aspect of the pelvis as seen on the prior study. This   may reflect infected fluid collection. Urinary bladder of wall thickening. Correlate clinically with signs of   cystitis. Fluid seen within the colon, which may reflect underlying diarrheal disease. These findings were discussed with ESTER Fallon at 1:50 p.m. on 04/17/2021         XR CHEST (2 VW)    (Results Pending)           ED [facility]        Patient presents as above with abdominal pain. Clinically, patient has dry mucous membranes and an IV fluids were initiated. Laboratory evaluation today reveals white blood cell count of 41,000 consistent with patient's diagnosis of Burkitt's non-Hodgkin's lymphoma. Patient's glucose also 359 which is likely the reason that patient has dry mucous membranes on exam today. CT abdomen/pelvis today with IV contrast revealed intra-abdominal free air consistent with perforated viscus. There is also mention of left-sided pneumothorax. Patient not having chest pain or shortness of breath. Additionally there is evidence of pneumomediastinum, likely due to free air in abdominal compartment. Empiric antibiotics intravenously initiated. Plan to transfer to [facility] as patient has establish care at [facility] and requests transfer.     Patient case discussed with [facility] physician by my supervising physician today, Dr. Kamryn Mccarthy -see her note for details. Patient transferred to [facility] under care of Dr. Nuha Hyde    1. Abdominal pain, unspecified abdominal location    2. Peritoneal cavity free air          Pt transferred. Comment: Please note this report has been produced using speech recognition software and may contain errors related to that system including errors in grammar, punctuation, and spelling, as well as words and phrases that may be inappropriate. If there are any questions or concerns please feel free to contact the dictating provider for clarification.        Amaris Rivera  04/17/21 0217

## 2021-05-20 ENCOUNTER — HOSPITAL ENCOUNTER (OUTPATIENT)
Dept: INFUSION THERAPY | Age: 26
Setting detail: INFUSION SERIES
Discharge: HOME | End: 2021-05-20
Payer: COMMERCIAL

## 2021-05-20 DIAGNOSIS — C85.10 B-CELL LYMPHOMA, UNSPECIFIED B-CELL LYMPHOMA TYPE, UNSPECIFIED BODY REGION (HCC): Primary | ICD-10-CM

## 2021-05-20 LAB
ALBUMIN SERPL-MCNC: 3.9 GM/DL (ref 3.4–5)
ALP BLD-CCNC: 64 IU/L (ref 40–129)
ALT SERPL-CCNC: 37 U/L (ref 10–40)
ANION GAP SERPL CALCULATED.3IONS-SCNC: 6 MMOL/L (ref 4–16)
AST SERPL-CCNC: 13 IU/L (ref 15–37)
BILIRUB SERPL-MCNC: 0.2 MG/DL (ref 0–1)
BUN BLDV-MCNC: 7 MG/DL (ref 6–23)
CALCIUM SERPL-MCNC: 9.5 MG/DL (ref 8.3–10.6)
CHLORIDE BLD-SCNC: 99 MMOL/L (ref 99–110)
CO2: 29 MMOL/L (ref 21–32)
CREAT SERPL-MCNC: 0.5 MG/DL (ref 0.9–1.3)
GFR AFRICAN AMERICAN: >60 ML/MIN/1.73M2
GFR NON-AFRICAN AMERICAN: >60 ML/MIN/1.73M2
GLUCOSE BLD-MCNC: 191 MG/DL (ref 70–99)
POTASSIUM SERPL-SCNC: 3.7 MMOL/L (ref 3.5–5.1)
SODIUM BLD-SCNC: 134 MMOL/L (ref 135–145)
TOTAL PROTEIN: 5.8 GM/DL (ref 6.4–8.2)

## 2021-05-20 PROCEDURE — 85027 COMPLETE CBC AUTOMATED: CPT

## 2021-05-20 PROCEDURE — 99211 OFF/OP EST MAY X REQ PHY/QHP: CPT

## 2021-05-20 PROCEDURE — 80053 COMPREHEN METABOLIC PANEL: CPT

## 2021-05-20 PROCEDURE — 6360000002 HC RX W HCPCS: Performed by: NURSE PRACTITIONER

## 2021-05-20 PROCEDURE — 96523 IRRIG DRUG DELIVERY DEVICE: CPT

## 2021-05-20 PROCEDURE — 36591 DRAW BLOOD OFF VENOUS DEVICE: CPT

## 2021-05-20 PROCEDURE — 85007 BL SMEAR W/DIFF WBC COUNT: CPT

## 2021-05-20 PROCEDURE — 2580000003 HC RX 258: Performed by: NURSE PRACTITIONER

## 2021-05-20 RX ORDER — 0.9 % SODIUM CHLORIDE 0.9 %
250 INTRAVENOUS SOLUTION INTRAVENOUS ONCE
Status: CANCELLED | OUTPATIENT
Start: 2021-05-24 | End: 2021-05-24

## 2021-05-20 RX ORDER — SODIUM CHLORIDE 0.9 % (FLUSH) 0.9 %
10 SYRINGE (ML) INJECTION PRN
Status: CANCELLED | OUTPATIENT
Start: 2021-05-24

## 2021-05-20 RX ORDER — SODIUM CHLORIDE 0.9 % (FLUSH) 0.9 %
10 SYRINGE (ML) INJECTION PRN
Status: DISCONTINUED | OUTPATIENT
Start: 2021-05-20 | End: 2021-05-21 | Stop reason: HOSPADM

## 2021-05-20 RX ORDER — HEPARIN SODIUM (PORCINE) LOCK FLUSH IV SOLN 100 UNIT/ML 100 UNIT/ML
500 SOLUTION INTRAVENOUS PRN
Status: DISCONTINUED | OUTPATIENT
Start: 2021-05-20 | End: 2021-05-21 | Stop reason: HOSPADM

## 2021-05-20 RX ORDER — SODIUM CHLORIDE 0.9 % (FLUSH) 0.9 %
10 SYRINGE (ML) INJECTION PRN
Status: CANCELLED
Start: 2021-05-24

## 2021-05-20 RX ORDER — HEPARIN SODIUM (PORCINE) LOCK FLUSH IV SOLN 100 UNIT/ML 100 UNIT/ML
500 SOLUTION INTRAVENOUS PRN
Status: CANCELLED
Start: 2021-05-24

## 2021-05-20 RX ADMIN — SODIUM CHLORIDE, PRESERVATIVE FREE 10 ML: 5 INJECTION INTRAVENOUS at 08:55

## 2021-05-20 RX ADMIN — SODIUM CHLORIDE, PRESERVATIVE FREE 10 ML: 5 INJECTION INTRAVENOUS at 08:54

## 2021-05-20 RX ADMIN — SODIUM CHLORIDE, PRESERVATIVE FREE 10 ML: 5 INJECTION INTRAVENOUS at 08:50

## 2021-05-20 RX ADMIN — HEPARIN 500 UNITS: 100 SYRINGE at 08:55

## 2021-05-20 NOTE — DISCHARGE SUMMARY
Tolerated Lab Draw and [place] well. Reviewed discharge instructions, understanding verbalized. Patient discharged home  with family. Down to exit per self.     Orders Placed This Encounter   Medications    sodium chloride flush 0.9 % injection 10 mL    heparin flush 100 UNIT/ML injection 500 Units

## 2021-05-20 NOTE — PLAN OF CARE
To unit room 3 for Lab Draw and port flush. Reoientated to unit. Procedure and plan of care explained. Questions answered. Understanding verbalized.

## 2021-05-21 LAB
ANISOCYTOSIS: ABNORMAL
BANDED NEUTROPHILS ABSOLUTE COUNT: 1.39 K/CU MM
BANDED NEUTROPHILS RELATIVE PERCENT: 6 % (ref 5–11)
BASOPHILS ABSOLUTE: 0.2 K/CU MM
BASOPHILS RELATIVE PERCENT: 1 % (ref 0–1)
DIFFERENTIAL TYPE: ABNORMAL
HCT VFR BLD CALC: 30.4 % (ref 42–52)
HEMOGLOBIN: 10.6 GM/DL (ref 13.5–18)
HYPERSEGMENTED NEUTROPHILS: PRESENT
LYMPHOCYTES ABSOLUTE: 1.4 K/CU MM
LYMPHOCYTES RELATIVE PERCENT: 6 % (ref 24–44)
MCH RBC QN AUTO: 31 PG (ref 27–31)
MCHC RBC AUTO-ENTMCNC: 34.9 % (ref 32–36)
MCV RBC AUTO: 88.9 FL (ref 78–100)
PDW BLD-RTO: 14.8 % (ref 11.7–14.9)
PLATELET # BLD: 283 K/CU MM (ref 140–440)
PLT MORPHOLOGY: ABNORMAL
PMV BLD AUTO: 10.2 FL (ref 7.5–11.1)
RBC # BLD: 3.42 M/CU MM (ref 4.6–6.2)
SEGMENTED NEUTROPHILS ABSOLUTE COUNT: 20.1 K/CU MM
SEGMENTED NEUTROPHILS RELATIVE PERCENT: 87 % (ref 36–66)
WBC # BLD: 23.1 K/CU MM (ref 4–10.5)

## 2021-06-04 DIAGNOSIS — C83.70 BURKITT LYMPHOMA, UNSPECIFIED BODY REGION (HCC): ICD-10-CM

## 2021-06-09 ENCOUNTER — HOSPITAL ENCOUNTER (EMERGENCY)
Age: 26
Discharge: HOME | End: 2021-06-10
Attending: EMERGENCY MEDICINE
Payer: COMMERCIAL

## 2021-06-09 ENCOUNTER — APPOINTMENT (OUTPATIENT)
Dept: CT IMAGING | Age: 26
End: 2021-06-09
Payer: COMMERCIAL

## 2021-06-09 DIAGNOSIS — R11.2 NON-INTRACTABLE VOMITING WITH NAUSEA, UNSPECIFIED VOMITING TYPE: Primary | ICD-10-CM

## 2021-06-09 DIAGNOSIS — D61.818 PANCYTOPENIA (HCC): ICD-10-CM

## 2021-06-09 LAB
ALBUMIN SERPL-MCNC: 4 GM/DL (ref 3.4–5)
ALP BLD-CCNC: 63 IU/L (ref 40–129)
ALT SERPL-CCNC: 27 U/L (ref 10–40)
ANION GAP SERPL CALCULATED.3IONS-SCNC: 10 MMOL/L (ref 4–16)
AST SERPL-CCNC: 13 IU/L (ref 15–37)
BACTERIA: NEGATIVE /HPF
BASOPHILS ABSOLUTE: 0 K/CU MM
BASOPHILS RELATIVE PERCENT: 1 % (ref 0–1)
BILIRUB SERPL-MCNC: 0.4 MG/DL (ref 0–1)
BILIRUBIN URINE: NEGATIVE MG/DL
BLOOD, URINE: NEGATIVE
BUN BLDV-MCNC: 11 MG/DL (ref 6–23)
CALCIUM SERPL-MCNC: 9.1 MG/DL (ref 8.3–10.6)
CHLORIDE BLD-SCNC: 98 MMOL/L (ref 99–110)
CLARITY: CLEAR
CO2: 24 MMOL/L (ref 21–32)
COLOR: YELLOW
CREAT SERPL-MCNC: 0.4 MG/DL (ref 0.9–1.3)
DIFFERENTIAL TYPE: ABNORMAL
EOSINOPHILS ABSOLUTE: 0 K/CU MM
EOSINOPHILS RELATIVE PERCENT: 0 % (ref 0–3)
GFR AFRICAN AMERICAN: >60 ML/MIN/1.73M2
GFR NON-AFRICAN AMERICAN: >60 ML/MIN/1.73M2
GLUCOSE BLD-MCNC: 232 MG/DL (ref 70–99)
GLUCOSE, URINE: 50 MG/DL
HCT VFR BLD CALC: 26.6 % (ref 42–52)
HEMOGLOBIN: 8.9 GM/DL (ref 13.5–18)
IMMATURE NEUTROPHIL %: 1.4 % (ref 0–0.43)
KETONES, URINE: ABNORMAL MG/DL
LEUKOCYTE ESTERASE, URINE: NEGATIVE
LIPASE: 25 IU/L (ref 13–60)
LYMPHOCYTES ABSOLUTE: 0.3 K/CU MM
LYMPHOCYTES RELATIVE PERCENT: 12.1 % (ref 24–44)
MAGNESIUM: 1.9 MG/DL (ref 1.8–2.4)
MCH RBC QN AUTO: 29.9 PG (ref 27–31)
MCHC RBC AUTO-ENTMCNC: 33.5 % (ref 32–36)
MCV RBC AUTO: 89.3 FL (ref 78–100)
MONOCYTES ABSOLUTE: 0 K/CU MM
MONOCYTES RELATIVE PERCENT: 0.5 % (ref 0–4)
MUCUS: ABNORMAL HPF
NITRITE URINE, QUANTITATIVE: NEGATIVE
NUCLEATED RBC %: 0 %
PDW BLD-RTO: 13.9 % (ref 11.7–14.9)
PH, URINE: 7 (ref 5–8)
PLATELET # BLD: 110 K/CU MM (ref 140–440)
PMV BLD AUTO: 9.6 FL (ref 7.5–11.1)
POTASSIUM SERPL-SCNC: 3.6 MMOL/L (ref 3.5–5.1)
PROTEIN UA: NEGATIVE MG/DL
RBC # BLD: 2.98 M/CU MM (ref 4.6–6.2)
RBC URINE: 1 /HPF (ref 0–3)
SEGMENTED NEUTROPHILS ABSOLUTE COUNT: 1.8 K/CU MM
SEGMENTED NEUTROPHILS RELATIVE PERCENT: 85 % (ref 36–66)
SODIUM BLD-SCNC: 132 MMOL/L (ref 135–145)
SPECIFIC GRAVITY UA: 1.04 (ref 1–1.03)
TOTAL IMMATURE NEUTOROPHIL: 0.03 K/CU MM
TOTAL NUCLEATED RBC: 0 K/CU MM
TOTAL PROTEIN: 6 GM/DL (ref 6.4–8.2)
TRICHOMONAS: ABNORMAL /HPF
UROBILINOGEN, URINE: NEGATIVE MG/DL (ref 0.2–1)
WBC # BLD: 2.1 K/CU MM (ref 4–10.5)
WBC UA: 1 /HPF (ref 0–2)
YEAST: ABNORMAL /HPF

## 2021-06-09 PROCEDURE — 96374 THER/PROPH/DIAG INJ IV PUSH: CPT

## 2021-06-09 PROCEDURE — 6360000002 HC RX W HCPCS: Performed by: PHYSICIAN ASSISTANT

## 2021-06-09 PROCEDURE — 6370000000 HC RX 637 (ALT 250 FOR IP): Performed by: PHYSICIAN ASSISTANT

## 2021-06-09 PROCEDURE — 96375 TX/PRO/DX INJ NEW DRUG ADDON: CPT

## 2021-06-09 PROCEDURE — 81001 URINALYSIS AUTO W/SCOPE: CPT

## 2021-06-09 PROCEDURE — 85610 PROTHROMBIN TIME: CPT

## 2021-06-09 PROCEDURE — 85025 COMPLETE CBC W/AUTO DIFF WBC: CPT

## 2021-06-09 PROCEDURE — C9113 INJ PANTOPRAZOLE SODIUM, VIA: HCPCS | Performed by: PHYSICIAN ASSISTANT

## 2021-06-09 PROCEDURE — 83690 ASSAY OF LIPASE: CPT

## 2021-06-09 PROCEDURE — 2580000003 HC RX 258: Performed by: PHYSICIAN ASSISTANT

## 2021-06-09 PROCEDURE — 83735 ASSAY OF MAGNESIUM: CPT

## 2021-06-09 PROCEDURE — 93005 ELECTROCARDIOGRAM TRACING: CPT | Performed by: PHYSICIAN ASSISTANT

## 2021-06-09 PROCEDURE — 74177 CT ABD & PELVIS W/CONTRAST: CPT

## 2021-06-09 PROCEDURE — 2500000003 HC RX 250 WO HCPCS: Performed by: PHYSICIAN ASSISTANT

## 2021-06-09 PROCEDURE — 80053 COMPREHEN METABOLIC PANEL: CPT

## 2021-06-09 PROCEDURE — 99285 EMERGENCY DEPT VISIT HI MDM: CPT

## 2021-06-09 PROCEDURE — 6360000004 HC RX CONTRAST MEDICATION: Performed by: PHYSICIAN ASSISTANT

## 2021-06-09 PROCEDURE — 96361 HYDRATE IV INFUSION ADD-ON: CPT

## 2021-06-09 RX ORDER — 0.9 % SODIUM CHLORIDE 0.9 %
1000 INTRAVENOUS SOLUTION INTRAVENOUS ONCE
Status: COMPLETED | OUTPATIENT
Start: 2021-06-09 | End: 2021-06-09

## 2021-06-09 RX ORDER — PANTOPRAZOLE SODIUM 40 MG/10ML
40 INJECTION, POWDER, LYOPHILIZED, FOR SOLUTION INTRAVENOUS ONCE
Status: COMPLETED | OUTPATIENT
Start: 2021-06-09 | End: 2021-06-09

## 2021-06-09 RX ORDER — PROMETHAZINE HYDROCHLORIDE 25 MG/1
25 TABLET ORAL EVERY 6 HOURS PRN
Qty: 15 TABLET | Refills: 0 | Status: SHIPPED | OUTPATIENT
Start: 2021-06-09 | End: 2021-06-16

## 2021-06-09 RX ORDER — ONDANSETRON 2 MG/ML
4 INJECTION INTRAMUSCULAR; INTRAVENOUS EVERY 30 MIN PRN
Status: DISCONTINUED | OUTPATIENT
Start: 2021-06-09 | End: 2021-06-10 | Stop reason: HOSPADM

## 2021-06-09 RX ORDER — SODIUM CHLORIDE 0.9 % (FLUSH) 0.9 %
10 SYRINGE (ML) INJECTION
Status: COMPLETED | OUTPATIENT
Start: 2021-06-09 | End: 2021-06-09

## 2021-06-09 RX ORDER — PROMETHAZINE HYDROCHLORIDE 25 MG/1
25 TABLET ORAL EVERY 6 HOURS PRN
Qty: 21 TABLET | Refills: 0 | Status: SHIPPED | OUTPATIENT
Start: 2021-06-09 | End: 2021-06-16

## 2021-06-09 RX ADMIN — SODIUM CHLORIDE, PRESERVATIVE FREE 10 ML: 5 INJECTION INTRAVENOUS at 17:30

## 2021-06-09 RX ADMIN — METFORMIN HYDROCHLORIDE 500 MG: 500 TABLET ORAL at 23:20

## 2021-06-09 RX ADMIN — ONDANSETRON 4 MG: 2 INJECTION INTRAMUSCULAR; INTRAVENOUS at 16:33

## 2021-06-09 RX ADMIN — PANTOPRAZOLE SODIUM 40 MG: 40 INJECTION, POWDER, FOR SOLUTION INTRAVENOUS at 23:20

## 2021-06-09 RX ADMIN — SODIUM CHLORIDE 1000 ML: 9 INJECTION, SOLUTION INTRAVENOUS at 20:52

## 2021-06-09 RX ADMIN — IOPAMIDOL 75 ML: 755 INJECTION, SOLUTION INTRAVENOUS at 17:30

## 2021-06-09 RX ADMIN — SODIUM CHLORIDE 1000 ML: 9 INJECTION, SOLUTION INTRAVENOUS at 16:33

## 2021-06-09 RX ADMIN — FAMOTIDINE 20 MG: 10 INJECTION, SOLUTION INTRAVENOUS at 16:33

## 2021-06-09 ASSESSMENT — PAIN DESCRIPTION - LOCATION: LOCATION: HEAD

## 2021-06-09 ASSESSMENT — PAIN SCALES - GENERAL: PAINLEVEL_OUTOF10: 8

## 2021-06-09 ASSESSMENT — PAIN DESCRIPTION - PAIN TYPE: TYPE: ACUTE PAIN

## 2021-06-09 ASSESSMENT — PAIN DESCRIPTION - ORIENTATION: ORIENTATION: ANTERIOR

## 2021-06-09 ASSESSMENT — PAIN DESCRIPTION - DESCRIPTORS: DESCRIPTORS: THROBBING

## 2021-06-09 NOTE — ED NOTES
Mother at [address] [place] [address], [facility]  06/09/21 [id number]

## 2021-06-09 NOTE — ED PROVIDER NOTES
12 lead EKG per my interpretation:  Sinus Tachycardia 135  Axis is   Normal  QTc is  444  There is no specific T wave changes appreciated. There is no specific ST wave changes appreciated.     Prior EKG to compare with was not available         Kaylee Regan DO  06/09/21 [id number]

## 2021-06-09 NOTE — ED PROVIDER NOTES
notes for additional information     PAST MEDICAL & SURGICAL HISTORY    Past Medical History:   Diagnosis Date    Asthma     Burkitt lymphoma ([facility] 3/25/2021    Burkitt's lymphoma ([facility] 6/4/2021    Diabetes mellitus ([facility]     High grade B-cell lymphoma ([facility]     Unspecified B-cell lymphoma, unspecified site ([facility] 3/29/2021     Past Surgical History:   Procedure Laterality Date    PORT SURGERY N/A 2/24/2021    PORT INSERTION performed by Milli Esparza MD at [facility]    Current Outpatient Rx   Medication Sig Dispense Refill    promethazine (PHENERGAN) 25 MG tablet Take 1 tablet by mouth every 6 hours as needed for Nausea 15 tablet 0    promethazine (PHENERGAN) 25 MG tablet Take 1 tablet by mouth every 6 hours as needed for Nausea 21 tablet 0    pantoprazole (PROTONIX) 40 MG tablet Take 40 mg by mouth daily      senna (SENOKOT) 8.6 MG tablet Take 1 tablet by mouth 2 times daily      fluconazole (DIFLUCAN) 200 MG tablet Take 200 mg by mouth daily      levoFLOXacin (LEVAQUIN) 500 MG tablet Take 500 mg by mouth daily      prochlorperazine (COMPAZINE) 10 MG tablet Take 10 mg by mouth every 6 hours as needed      sulfamethoxazole-trimethoprim (BACTRIM DS;SEPTRA DS) 800-160 MG per tablet Take 1 tablet by mouth once      valACYclovir (VALTREX) 500 MG tablet Take 500 mg by mouth daily      ondansetron (ZOFRAN) 4 MG tablet Take 4 mg by mouth every 8 hours as needed for Nausea or Vomiting      escitalopram (LEXAPRO) 10 MG tablet Take 1 tablet by mouth daily 90 tablet 1    insulin aspart (NOVOLOG FLEXPEN) 100 UNIT/ML injection pen Sliding scale ( pt unsure units) 5 pen 1    metFORMIN (GLUCOPHAGE-XR) 500 MG extended release tablet Take by mouth 2 times daily (with meals)       naproxen (NAPROSYN) 500 MG tablet Take 1 tablet by mouth 2 times daily 60 tablet 0    acetaminophen (APAP EXTRA STRENGTH) 500 MG tablet Take 1 tablet by mouth every 6 hours as needed for Pain 30 tablet 0 ALLERGIES    No Known Allergies    SOCIAL AND FAMILY HISTORY    Social History     Socioeconomic History    Marital status: Single     Spouse name: None    Number of children: None    Years of education: None    Highest education level: None   Occupational History    None   Tobacco Use    Smoking status: Current Every Day Smoker     Packs/day: 0.75     Years: 5.00     Pack years: 3.75     Types: Cigarettes     Start date: 2016    Smokeless tobacco: Never Used   Vaping Use    Vaping Use: Never used   Substance and Sexual Activity    Alcohol use: No    Drug use: No    Sexual activity: Yes   Other Topics Concern    None   Social History Narrative    None     Social Determinants of Health     Financial Resource Strain: Low Risk     Difficulty of Paying Living Expenses: Not hard at all   Food Insecurity: No Food Insecurity    Worried About Running Out of Food in the Last Year: Never true    Shi of Food in the Last Year: Never true   Transportation Needs: No Transportation Needs    Lack of Transportation (Medical): No    Lack of Transportation (Non-Medical):  No   Physical Activity:     Days of Exercise per Week:     Minutes of Exercise per Session:    Stress:     Feeling of Stress :    Social Connections:     Frequency of Communication with Friends and Family:     Frequency of Social Gatherings with Friends and Family:     Attends Taoist Services:     Active Member of Clubs or Organizations:     Attends Club or Organization Meetings:     Marital Status:    Intimate Partner Violence:     Fear of Current or Ex-Partner:     Emotionally Abused:     Physically Abused:     Sexually Abused:      Family History   Problem Relation Age of Onset    Diabetes type 2  Father     Cancer Maternal Aunt     Diabetes type 2  Maternal Grandmother        PHYSICAL EXAM    VITAL SIGNS: /81   Pulse 83   Temp 98.6 °F (37 °C) (Oral)   Resp 18   Ht 6' 4\" (1.93 m)   Wt 166 lb (75.3 kg) SpO2 99%   BMI 20.21 kg/m²   General:  Well developed, well nourished, pleasant, nontoxic, In no acute distress  Eyes:  Sclera nonicteric, Conjunctiva moist, No discharge  Head:  Normocephalic, Atramautic  Neck/Lymphatics: Supple, no JVD, no swollen nodes  Respiratory:  Clear to ausculation bilaterally, No retractions, Non labored breathing  Cardiovascular:  RRR, Normal S1/S2  GI:   No gross discoloration. Bowel sounds present in all quadrants, No audible bruits. Soft,  Nondistended. Clean dry and intact surgical dressing noted to the mid abdomen. Abdomen is diffusely mildly tender to deep palpation without rebound tenderness or guarding, No palpable pulsatile masses or obvious hernias.    Back:  No CVA tenderness to percussion bilaterally  Musculoskeletal:  No edema, No deformity  Peripheral Vascular: Distal pulses 2+ equal bilaterally  Integument: No rash, Normal turgor  Neurologic:  Alert & oriented, Normal speech  Psychiatric: Cooperative, pleasant affect       I have reviewed and interpreted all of the currently available lab results from this visit (if applicable):  Results for orders placed or performed during the hospital encounter of 06/09/21   CBC auto diff   Result Value Ref Range    WBC 2.1 (L) 4.0 - 10.5 K/CU MM    RBC 2.98 (L) 4.6 - 6.2 M/CU MM    Hemoglobin 8.9 (L) 13.5 - 18.0 GM/DL    Hematocrit 26.6 (L) 42 - 52 %    MCV 89.3 78 - 100 FL    MCH 29.9 27 - 31 PG    MCHC 33.5 32.0 - 36.0 %    RDW 13.9 11.7 - 14.9 %    Platelets 637 (L) 741 - 440 K/CU MM    MPV 9.6 7.5 - 11.1 FL    Differential Type AUTOMATED DIFFERENTIAL     Segs Relative 85.0 (H) 36 - 66 %    Lymphocytes % 12.1 (L) 24 - 44 %    Monocytes % 0.5 0 - 4 %    Eosinophils % 0.0 0 - 3 %    Basophils % 1.0 0 - 1 %    Segs Absolute 1.8 K/CU MM    Lymphocytes Absolute 0.3 K/CU MM    Monocytes Absolute 0.0 K/CU MM    Eosinophils Absolute 0.0 K/CU MM    Basophils Absolute 0.0 K/CU MM    Nucleated RBC % 0.0 %    Total Nucleated RBC 0.0 K/CU MM Total Immature Neutrophil 0.03 K/CU MM    Immature Neutrophil % 1.4 (H) 0 - 0.43 %   CMP   Result Value Ref Range    Sodium 132 (L) 135 - 145 MMOL/L    Potassium 3.6 3.5 - 5.1 MMOL/L    Chloride 98 (L) 99 - 110 mMol/L    CO2 24 21 - 32 MMOL/L    BUN 11 6 - 23 MG/DL    CREATININE 0.4 (L) 0.9 - 1.3 MG/DL    Glucose 232 (H) 70 - 99 MG/DL    Calcium 9.1 8.3 - 10.6 MG/DL    Albumin 4.0 3.4 - 5.0 GM/DL    Total Protein 6.0 (L) 6.4 - 8.2 GM/DL    Total Bilirubin 0.4 0.0 - 1.0 MG/DL    ALT 27 10 - 40 U/L    AST 13 (L) 15 - 37 IU/L    Alkaline Phosphatase 63 40 - 129 IU/L    GFR Non-African American >60 >60 mL/min/1.73m2    GFR African American >60 >60 mL/min/1.73m2    Anion Gap 10 4 - 16   Lipase   Result Value Ref Range    Lipase 25 13 - 60 IU/L   Magnesium   Result Value Ref Range    Magnesium 1.9 1.8 - 2.4 mg/dl   Urinalysis   Result Value Ref Range    Color, UA YELLOW YELLOW    Clarity, UA CLEAR CLEAR    Glucose, Urine 50 (A) NEGATIVE MG/DL    Bilirubin Urine NEGATIVE NEGATIVE MG/DL    Ketones, Urine SMALL (A) NEGATIVE MG/DL    Specific Gravity, UA 1.043 (H) 1.001 - 1.035    Blood, Urine NEGATIVE NEGATIVE    pH, Urine 7.0 5.0 - 8.0    Protein, UA NEGATIVE NEGATIVE MG/DL    Urobilinogen, Urine NEGATIVE 0.2 - 1.0 MG/DL    Nitrite Urine, Quantitative NEGATIVE NEGATIVE    Leukocyte Esterase, Urine NEGATIVE NEGATIVE    RBC, UA 1 0 - 3 /HPF    WBC, UA 1 0 - 2 /HPF    Bacteria, UA NEGATIVE NEGATIVE /HPF    Yeast, UA RARE /HPF    Mucus, UA RARE (A) NEGATIVE HPF    Trichomonas, UA NONE SEEN NONE SEEN /HPF   EKG 12 Lead   Result Value Ref Range    Ventricular Rate 135 BPM    Atrial Rate 135 BPM    P-R Interval 130 ms    QRS Duration 86 ms    Q-T Interval 296 ms    QTc Calculation (Bazett) 444 ms    P Axis 69 degrees    R Axis 52 degrees    T Axis 66 degrees    Diagnosis       Sinus tachycardia  Nonspecific T wave abnormality  Abnormal ECG  When compared with ECG of 19-FEB-2021 17:08,  No significant change was found RADIOLOGY/PROCEDURES        CT ABDOMEN PELVIS W IV CONTRAST Additional Contrast? None (Final result)  Result time 06/09/21 18:17:39  Final result by Julius Luke MD (06/09/21 18:17:39)                Impression:    Mural thickening involving the gastric antrum, duodenal sweep, and the   proximal jejunum, found to the level of an anastomotic staple line within the   left small bowel.  The findings would suggest nonspecific   gastritis/enteritis.  No perforation is seen at this time.  No ulceration is   seen, but that can be difficult to visualized with CT technique. Scant amount of free pelvic fluid, which is likely reactive. Stable hypodense lesion within the pancreatic tail, unchanged when compared   to the most recent examinations.  Continued surveillance recommended. Narrative:    EXAMINATION:   CT OF THE ABDOMEN AND PELVIS WITH CONTRAST 6/9/2021 2:28 pm     TECHNIQUE:   CT of the abdomen and pelvis was performed with the administration of   intravenous contrast. Multiplanar reformatted images are provided for review. Dose modulation, iterative reconstruction, and/or weight based adjustment of   the mA/kV was utilized to reduce the radiation dose to as low as reasonably   achievable.      COMPARISON:   04/17/2021     HISTORY:   ORDERING SYSTEM PROVIDED HISTORY: h/o burkitts lymphoma, recent bowel   obstruction surgery, [place]   TECHNOLOGIST PROVIDED HISTORY:   Reason for exam:->h/o burkitts lymphoma, recent bowel obstruction surgery, [place]   Additional Contrast?->None   Decision Support Exception - unselect if not a suspected or confirmed   emergency medical condition->Emergency Medical Condition (MA)   Reason for Exam: h/o burkitts lymphoma, recent bowel obstruction surgery, [place]   Acuity: Acute   Type of Exam: Initial   Additional signs and symptoms: none   Relevant Medical/Surgical History: 75ml isovue 370/ gfr>60     FINDINGS:   Lower Chest: Visualized lungs are clear.  Base of the heart is unremarkable. Evaluation of the upper abdominal viscera is degraded by streak artifact   generated by the patient's arms crossed in front of his abdomen. Organs: Having said that, no acute hepatic abnormality is identified. Gallbladder is unremarkable.  Portal vein is patent.  Spleen and adrenals   unremarkable. Hypodense lesion within the pancreatic tail is again seen measuring 1.2 cm   not substantially changed when compared to the previous exams.  Pancreas is   otherwise unremarkable. No acute or suspicious renal abnormality is identified. GI/Bowel: No large bowel abnormalities are identified.  The appendix is   normal.     Distal esophagus is unremarkable.  There is mild mural thickening involving   the gastric antrum, pylorus, and the duodenal sweep, with extension into the   proximal small bowel.  That mural thickening extends to the level of an   anastomotic staple line in the left mid abdomen.  Small bowel distal lead is   unremarkable in appearance.  No evidence of small bowel obstruction is seen. Pelvis: Prostate unremarkable.  Urinary bladder unremarkable.  Scant amount   of free pelvic fluid. Peritoneum/Retroperitoneum: Abdominal aorta is normal in caliber.  The   superior mesenteric artery is enhancing.  No pathologically enlarged upper   abdominal, retroperitoneal, iliac chain, pelvic sidewall, or inguinal   lymphadenopathy. Bones/Soft Tissues: No osteolytic or osteoblastic bone lesions are   identified.  No acute bony abnormalities identified.  Scarring along the   ventral wall of the abdomen and pelvis noted. The extra-abdominal and extra   pelvic soft tissues are otherwise unremarkable.                       EKG Interpretation  Please see ED physician's note - Dr. Sadaf Zamarripa - for EKG interpretation        ED COURSE & MEDICAL DECISION MAKING      Vital signs and nursing notes reviewed during ED course. I have independently evaluated this patient .

## 2021-06-09 NOTE — ED TRIAGE NOTES
Patient had bowel surgery about 1 month ago and started Chemo last week for 5 days came home last night from [facility] and has not been able to keep anything down. Has taken both zofran at home and has not had relief.

## 2021-06-10 ENCOUNTER — HOSPITAL ENCOUNTER (OUTPATIENT)
Dept: INFUSION THERAPY | Age: 26
Setting detail: INFUSION SERIES
Discharge: HOME | End: 2021-06-10
Payer: COMMERCIAL

## 2021-06-10 VITALS
OXYGEN SATURATION: 99 % | RESPIRATION RATE: 16 BRPM | HEART RATE: 89 BPM | SYSTOLIC BLOOD PRESSURE: 111 MMHG | DIASTOLIC BLOOD PRESSURE: 75 MMHG

## 2021-06-10 VITALS
HEIGHT: 76 IN | WEIGHT: 166 LBS | BODY MASS INDEX: 20.22 KG/M2 | DIASTOLIC BLOOD PRESSURE: 81 MMHG | OXYGEN SATURATION: 99 % | SYSTOLIC BLOOD PRESSURE: 115 MMHG | HEART RATE: 83 BPM | RESPIRATION RATE: 18 BRPM | TEMPERATURE: 98.6 F

## 2021-06-10 DIAGNOSIS — C85.10 B-CELL LYMPHOMA, UNSPECIFIED B-CELL LYMPHOMA TYPE, UNSPECIFIED BODY REGION (HCC): Primary | ICD-10-CM

## 2021-06-10 LAB
ALBUMIN SERPL-MCNC: 4.1 GM/DL (ref 3.4–5)
ALP BLD-CCNC: 62 IU/L (ref 40–129)
ALT SERPL-CCNC: 23 U/L (ref 10–40)
ANION GAP SERPL CALCULATED.3IONS-SCNC: 7 MMOL/L (ref 4–16)
AST SERPL-CCNC: 10 IU/L (ref 15–37)
BANDED NEUTROPHILS ABSOLUTE COUNT: 0.04 K/CU MM
BANDED NEUTROPHILS RELATIVE PERCENT: 4 % (ref 5–11)
BILIRUB SERPL-MCNC: 0.4 MG/DL (ref 0–1)
BUN BLDV-MCNC: 9 MG/DL (ref 6–23)
CALCIUM SERPL-MCNC: 9 MG/DL (ref 8.3–10.6)
CHLORIDE BLD-SCNC: 100 MMOL/L (ref 99–110)
CO2: 25 MMOL/L (ref 21–32)
CREAT SERPL-MCNC: 0.4 MG/DL (ref 0.9–1.3)
DIFFERENTIAL TYPE: ABNORMAL
EKG ATRIAL RATE: 135 BPM
EKG DIAGNOSIS: NORMAL
EKG P AXIS: 69 DEGREES
EKG P-R INTERVAL: 130 MS
EKG Q-T INTERVAL: 296 MS
EKG QRS DURATION: 86 MS
EKG QTC CALCULATION (BAZETT): 444 MS
EKG R AXIS: 52 DEGREES
EKG T AXIS: 66 DEGREES
EKG VENTRICULAR RATE: 135 BPM
GFR AFRICAN AMERICAN: >60 ML/MIN/1.73M2
GFR NON-AFRICAN AMERICAN: >60 ML/MIN/1.73M2
GLUCOSE BLD-MCNC: 238 MG/DL (ref 70–99)
HCT VFR BLD CALC: 25.4 % (ref 42–52)
HEMOGLOBIN: 8.5 GM/DL (ref 13.5–18)
LYMPHOCYTES ABSOLUTE: 0.3 K/CU MM
LYMPHOCYTES RELATIVE PERCENT: 33 % (ref 24–44)
MCH RBC QN AUTO: 29.5 PG (ref 27–31)
MCHC RBC AUTO-ENTMCNC: 33.5 % (ref 32–36)
MCV RBC AUTO: 88.2 FL (ref 78–100)
PDW BLD-RTO: 13.4 % (ref 11.7–14.9)
PLATELET # BLD: 86 K/CU MM (ref 140–440)
PLT MORPHOLOGY: ABNORMAL
PMV BLD AUTO: 9.5 FL (ref 7.5–11.1)
POTASSIUM SERPL-SCNC: 3.6 MMOL/L (ref 3.5–5.1)
RBC # BLD: 2.88 M/CU MM (ref 4.6–6.2)
SEGMENTED NEUTROPHILS ABSOLUTE COUNT: 0.7 K/CU MM
SEGMENTED NEUTROPHILS RELATIVE PERCENT: 63 % (ref 36–66)
SODIUM BLD-SCNC: 132 MMOL/L (ref 135–145)
TOTAL PROTEIN: 5.8 GM/DL (ref 6.4–8.2)
TOXIC GRANULATION: PRESENT
WBC # BLD: 1 K/CU MM (ref 4–10.5)

## 2021-06-10 PROCEDURE — 6360000002 HC RX W HCPCS: Performed by: NURSE PRACTITIONER

## 2021-06-10 PROCEDURE — 2580000003 HC RX 258: Performed by: NURSE PRACTITIONER

## 2021-06-10 PROCEDURE — 85027 COMPLETE CBC AUTOMATED: CPT

## 2021-06-10 PROCEDURE — 85007 BL SMEAR W/DIFF WBC COUNT: CPT

## 2021-06-10 PROCEDURE — 93010 ELECTROCARDIOGRAM REPORT: CPT | Performed by: INTERNAL MEDICINE

## 2021-06-10 PROCEDURE — 36591 DRAW BLOOD OFF VENOUS DEVICE: CPT

## 2021-06-10 PROCEDURE — 80053 COMPREHEN METABOLIC PANEL: CPT

## 2021-06-10 RX ORDER — HEPARIN SODIUM (PORCINE) LOCK FLUSH IV SOLN 100 UNIT/ML 100 UNIT/ML
500 SOLUTION INTRAVENOUS PRN
Status: CANCELLED
Start: 2021-06-14

## 2021-06-10 RX ORDER — 0.9 % SODIUM CHLORIDE 0.9 %
250 INTRAVENOUS SOLUTION INTRAVENOUS ONCE
Status: CANCELLED | OUTPATIENT
Start: 2021-06-14 | End: 2021-06-14

## 2021-06-10 RX ORDER — SODIUM CHLORIDE 0.9 % (FLUSH) 0.9 %
10 SYRINGE (ML) INJECTION PRN
Status: DISCONTINUED | OUTPATIENT
Start: 2021-06-10 | End: 2021-06-11 | Stop reason: HOSPADM

## 2021-06-10 RX ORDER — SODIUM CHLORIDE 0.9 % (FLUSH) 0.9 %
10 SYRINGE (ML) INJECTION PRN
Status: CANCELLED | OUTPATIENT
Start: 2021-06-14

## 2021-06-10 RX ORDER — HEPARIN SODIUM (PORCINE) LOCK FLUSH IV SOLN 100 UNIT/ML 100 UNIT/ML
500 SOLUTION INTRAVENOUS PRN
Status: DISCONTINUED | OUTPATIENT
Start: 2021-06-10 | End: 2021-06-11 | Stop reason: HOSPADM

## 2021-06-10 RX ORDER — SODIUM CHLORIDE 0.9 % (FLUSH) 0.9 %
10 SYRINGE (ML) INJECTION PRN
Status: CANCELLED
Start: 2021-06-14

## 2021-06-10 RX ADMIN — SODIUM CHLORIDE, PRESERVATIVE FREE 10 ML: 5 INJECTION INTRAVENOUS at 10:30

## 2021-06-10 RX ADMIN — HEPARIN 500 UNITS: 100 SYRINGE at 10:30

## 2021-06-10 NOTE — PROGRESS NOTES
Lab results faxed to Dr. Jorge Morales in [facility], [phone] via [employer]. Attempted to call pt and notify he did not need product per Dr. Jennifer Rocha but his phone mailbox is full.

## 2021-06-10 NOTE — ED NOTES
Pt. reviewed discharge instructions, follow up instructions, and new medications. Pt. Aware of prescriptions sent to pharmacy. Pt. verbalizes understanding with no further questions. Pt. ambulatory and not showing any signs of distress. Pts. [place].        Omaira Fiore RN  06/10/21 6366

## 2021-06-10 NOTE — ED PROVIDER NOTES
I independently examined and evaluated Ilene Washington. In brief their history revealed patient is here with concern for dehydration with nausea and vomiting. No fevers. Patient was in baseline state of health until yesterday when the above started. Of note, patient is undergoing chemotherapy for Burkitt's lymphoma receiving his third round of chemotherapy yesterday. Patient follows at [facility] with Dr. Garrett Kulkarni. Additionally, patient is status post abdominal surgery for small bowel obstruction with perforation in April of this year. Their focused exam revealed the patient is afebrile and hemodynamically stable on room air. The patient appears age appropriate. Patient does appear slightly pale and slightly frail however is smiling and sitting upright in bed. Mucous membranes are moist. Speech is clear. Breathing is unlabored. Speaking clearly in full sentences. Abdomen is soft and nonperitoneal.  Skin is dry. Mental status is normal. The patient moves all extremities and is without facial droop. ED course: Pt presents as above. Emergent conditions considered. Presentation prompted initial labs and imaging. Work-up does demonstrate mild pancytopenia. CT imaging is obtained given patient's recent surgical issues and demonstrates likely gastritis versus enteritis. No perforation. Patient is improved with IV fluid and symptomatic treatment. Patient is tolerating oral fluid and food on recheck. I did discuss the case with Dr. Veronica Casey hematology/oncology fellow at [facility] who is covering calls at this time and she is amendable to the patient going home with his controlled symptoms in the setting of his pancytopenia. She is okay with patient receiving his home Neupogen this evening and following up tomorrow for repeated labs. Mother and patient are agreeable with this plan and comfortable with home-going.   Given patient's overall hemodynamic stability, lack of fever, benign exam and reassuring recheck throughout prolonged ED course I am comfortable discharging the patient home with further symptomatic treatment. Encouraged him to return to the emergency department for any new or worsening symptoms including specifically fevers. Discharged home with follow-up tomorrow. Questions sought and answered with the patient and mother. They voice understanding and agree with plan. Instructed to return for any worsening or worrisome concerns. All diagnostic, treatment, and disposition decisions were made by myself in conjunction with the Advanced Practice Provider. For all further details of the patient's emergency department visit, please see the Advanced Practice Provider's documentation.        Lashaun Marr MD  06/10/21 0059

## 2021-06-10 NOTE — PROGRESS NOTES
Tolerated Blood draw well. Reviewed discharge instruction, voiced understanding. Copies of AVS given. Pt discharged home. Pt to exit via [facility] per family member.     Orders Placed This Encounter   Medications    sodium chloride flush 0.9 % injection 10 mL    heparin flush 100 UNIT/ML injection 500 Units    sodium chloride flush 0.9 % injection 10 mL

## 2021-06-14 ENCOUNTER — HOSPITAL ENCOUNTER (OUTPATIENT)
Dept: INFUSION THERAPY | Age: 26
Setting detail: INFUSION SERIES
Discharge: HOME | End: 2021-06-14
Payer: COMMERCIAL

## 2021-06-14 DIAGNOSIS — C85.10 B-CELL LYMPHOMA, UNSPECIFIED B-CELL LYMPHOMA TYPE, UNSPECIFIED BODY REGION (HCC): Primary | ICD-10-CM

## 2021-06-14 LAB
ALBUMIN SERPL-MCNC: 4.3 GM/DL (ref 3.4–5)
ALP BLD-CCNC: 64 IU/L (ref 40–129)
ALT SERPL-CCNC: 17 U/L (ref 10–40)
ANION GAP SERPL CALCULATED.3IONS-SCNC: 14 MMOL/L (ref 4–16)
ANISOCYTOSIS: ABNORMAL
AST SERPL-CCNC: 10 IU/L (ref 15–37)
BASOPHILS ABSOLUTE: 0 K/CU MM
BASOPHILS RELATIVE PERCENT: 2 % (ref 0–1)
BILIRUB SERPL-MCNC: 0.5 MG/DL (ref 0–1)
BUN BLDV-MCNC: 11 MG/DL (ref 6–23)
CALCIUM SERPL-MCNC: 9.4 MG/DL (ref 8.3–10.6)
CELLS COUNTED: 50
CHLORIDE BLD-SCNC: 98 MMOL/L (ref 99–110)
CO2: 24 MMOL/L (ref 21–32)
CREAT SERPL-MCNC: 0.5 MG/DL (ref 0.9–1.3)
DIFFERENTIAL TYPE: ABNORMAL
EOSINOPHILS ABSOLUTE: 0 K/CU MM
EOSINOPHILS RELATIVE PERCENT: 2 % (ref 0–3)
GFR AFRICAN AMERICAN: >60 ML/MIN/1.73M2
GFR NON-AFRICAN AMERICAN: >60 ML/MIN/1.73M2
GLUCOSE BLD-MCNC: 159 MG/DL (ref 70–99)
HCT VFR BLD CALC: 21.6 % (ref 42–52)
HEMOGLOBIN: 7.5 GM/DL (ref 13.5–18)
LYMPHOCYTES ABSOLUTE: 0.2 K/CU MM
LYMPHOCYTES RELATIVE PERCENT: 94 % (ref 24–44)
MCH RBC QN AUTO: 29.6 PG (ref 27–31)
MCHC RBC AUTO-ENTMCNC: 34.7 % (ref 32–36)
MCV RBC AUTO: 85.4 FL (ref 78–100)
PDW BLD-RTO: 12.6 % (ref 11.7–14.9)
PLATELET # BLD: 15 K/CU MM (ref 140–440)
PLT MORPHOLOGY: ABNORMAL
POTASSIUM SERPL-SCNC: 3.7 MMOL/L (ref 3.5–5.1)
RBC # BLD: 2.53 M/CU MM (ref 4.6–6.2)
SEGMENTED NEUTROPHILS ABSOLUTE COUNT: 0 K/CU MM
SEGMENTED NEUTROPHILS RELATIVE PERCENT: 2 % (ref 36–66)
SODIUM BLD-SCNC: 136 MMOL/L (ref 135–145)
TOTAL PROTEIN: 6.2 GM/DL (ref 6.4–8.2)
WBC # BLD: 0.2 K/CU MM (ref 4–10.5)
WBC # BLD: ABNORMAL 10*3/UL

## 2021-06-14 PROCEDURE — 6360000002 HC RX W HCPCS: Performed by: NURSE PRACTITIONER

## 2021-06-14 PROCEDURE — 86901 BLOOD TYPING SEROLOGIC RH(D): CPT

## 2021-06-14 PROCEDURE — 36591 DRAW BLOOD OFF VENOUS DEVICE: CPT

## 2021-06-14 PROCEDURE — 85027 COMPLETE CBC AUTOMATED: CPT

## 2021-06-14 PROCEDURE — 2580000003 HC RX 258: Performed by: NURSE PRACTITIONER

## 2021-06-14 PROCEDURE — 86922 COMPATIBILITY TEST ANTIGLOB: CPT

## 2021-06-14 PROCEDURE — 86900 BLOOD TYPING SEROLOGIC ABO: CPT

## 2021-06-14 PROCEDURE — 80053 COMPREHEN METABOLIC PANEL: CPT

## 2021-06-14 PROCEDURE — 86850 RBC ANTIBODY SCREEN: CPT

## 2021-06-14 PROCEDURE — 85007 BL SMEAR W/DIFF WBC COUNT: CPT

## 2021-06-14 RX ORDER — HEPARIN SODIUM (PORCINE) LOCK FLUSH IV SOLN 100 UNIT/ML 100 UNIT/ML
500 SOLUTION INTRAVENOUS PRN
Status: CANCELLED
Start: 2021-06-17

## 2021-06-14 RX ORDER — 0.9 % SODIUM CHLORIDE 0.9 %
250 INTRAVENOUS SOLUTION INTRAVENOUS ONCE
Status: CANCELLED | OUTPATIENT
Start: 2021-06-17 | End: 2021-06-17

## 2021-06-14 RX ORDER — SODIUM CHLORIDE 0.9 % (FLUSH) 0.9 %
10 SYRINGE (ML) INJECTION PRN
Status: CANCELLED
Start: 2021-06-17

## 2021-06-14 RX ORDER — SODIUM CHLORIDE 0.9 % (FLUSH) 0.9 %
10 SYRINGE (ML) INJECTION PRN
Status: DISCONTINUED | OUTPATIENT
Start: 2021-06-14 | End: 2021-06-15 | Stop reason: HOSPADM

## 2021-06-14 RX ORDER — SODIUM CHLORIDE 0.9 % (FLUSH) 0.9 %
10 SYRINGE (ML) INJECTION PRN
Status: CANCELLED | OUTPATIENT
Start: 2021-06-17

## 2021-06-14 RX ORDER — HEPARIN SODIUM (PORCINE) LOCK FLUSH IV SOLN 100 UNIT/ML 100 UNIT/ML
500 SOLUTION INTRAVENOUS PRN
Status: DISCONTINUED | OUTPATIENT
Start: 2021-06-14 | End: 2021-06-15 | Stop reason: HOSPADM

## 2021-06-14 RX ADMIN — HEPARIN 500 UNITS: 100 SYRINGE at 11:38

## 2021-06-14 RX ADMIN — SODIUM CHLORIDE, PRESERVATIVE FREE 10 ML: 5 INJECTION INTRAVENOUS at 11:37

## 2021-06-14 NOTE — PROGRESS NOTES
SPOKE [address] King RN. REVIEWED ALERT VALUES AND HGB/HCT AND PLATELET. WILL FAX RESULTS AS SOON AS DIFF IS COMPLETE.    INFORMED THAT PT HAS STANDING ORDER FOR BLOOD/PLATELET TRANSFUSION AND IS ON OUR SCHEDULE TO BE DONE TOMORROW A.M.

## 2021-06-14 NOTE — PROGRESS NOTES
Spoke with mother per pt's request d/t his phone is not working. Informed of need for blood transfusion and platelets tomorrow at 0930    Reviewed neutropenic and bleeding precautions. She verbalized understanding and will speak with patient.

## 2021-06-14 NOTE — PROGRESS NOTES
Prior to discharge, the After Visit Summary Discharge Instructions were reviewed with the patient. He was offered a printed version of the AVS, but declined the offer. Recurrent appointment, pt tolerated infusion well. Pt discharged home. Pt to exit via wheelchair.     Orders Placed This Encounter   Medications    sodium chloride flush 0.9 % injection 10 mL    heparin flush 100 UNIT/ML injection 500 Units    sodium chloride flush 0.9 % injection 10 mL

## 2021-06-15 ENCOUNTER — HOSPITAL ENCOUNTER (OUTPATIENT)
Dept: INFUSION THERAPY | Age: 26
Setting detail: INFUSION SERIES
Discharge: HOME | End: 2021-06-15
Payer: COMMERCIAL

## 2021-06-15 VITALS
HEART RATE: 74 BPM | SYSTOLIC BLOOD PRESSURE: 102 MMHG | DIASTOLIC BLOOD PRESSURE: 63 MMHG | RESPIRATION RATE: 14 BRPM | OXYGEN SATURATION: 97 % | TEMPERATURE: 97.5 F

## 2021-06-15 DIAGNOSIS — C85.10 B-CELL LYMPHOMA, UNSPECIFIED B-CELL LYMPHOMA TYPE, UNSPECIFIED BODY REGION (HCC): Primary | ICD-10-CM

## 2021-06-15 PROCEDURE — 99211 OFF/OP EST MAY X REQ PHY/QHP: CPT

## 2021-06-15 PROCEDURE — P9016 RBC LEUKOCYTES REDUCED: HCPCS

## 2021-06-15 PROCEDURE — P9034 PLATELETS, PHERESIS: HCPCS

## 2021-06-15 PROCEDURE — 6360000002 HC RX W HCPCS: Performed by: NURSE PRACTITIONER

## 2021-06-15 PROCEDURE — 2580000003 HC RX 258: Performed by: NURSE PRACTITIONER

## 2021-06-15 PROCEDURE — 36430 TRANSFUSION BLD/BLD COMPNT: CPT

## 2021-06-15 RX ORDER — SODIUM CHLORIDE 0.9 % (FLUSH) 0.9 %
10 SYRINGE (ML) INJECTION PRN
Status: DISCONTINUED | OUTPATIENT
Start: 2021-06-15 | End: 2021-06-16 | Stop reason: HOSPADM

## 2021-06-15 RX ORDER — SODIUM CHLORIDE 0.9 % (FLUSH) 0.9 %
10 SYRINGE (ML) INJECTION PRN
Status: CANCELLED | OUTPATIENT
Start: 2021-06-17

## 2021-06-15 RX ORDER — HEPARIN SODIUM (PORCINE) LOCK FLUSH IV SOLN 100 UNIT/ML 100 UNIT/ML
500 SOLUTION INTRAVENOUS PRN
Status: DISCONTINUED | OUTPATIENT
Start: 2021-06-15 | End: 2021-06-16 | Stop reason: HOSPADM

## 2021-06-15 RX ORDER — HEPARIN SODIUM (PORCINE) LOCK FLUSH IV SOLN 100 UNIT/ML 100 UNIT/ML
500 SOLUTION INTRAVENOUS PRN
Status: CANCELLED
Start: 2021-06-17

## 2021-06-15 RX ORDER — SODIUM CHLORIDE 0.9 % (FLUSH) 0.9 %
10 SYRINGE (ML) INJECTION PRN
Status: CANCELLED
Start: 2021-06-17

## 2021-06-15 RX ORDER — 0.9 % SODIUM CHLORIDE 0.9 %
250 INTRAVENOUS SOLUTION INTRAVENOUS ONCE
Status: CANCELLED | OUTPATIENT
Start: 2021-06-17 | End: 2021-06-17

## 2021-06-15 RX ORDER — 0.9 % SODIUM CHLORIDE 0.9 %
250 INTRAVENOUS SOLUTION INTRAVENOUS ONCE
Status: DISCONTINUED | OUTPATIENT
Start: 2021-06-15 | End: 2021-06-15

## 2021-06-15 RX ADMIN — SODIUM CHLORIDE, PRESERVATIVE FREE 10 ML: 5 INJECTION INTRAVENOUS at 12:16

## 2021-06-15 RX ADMIN — SODIUM CHLORIDE, PRESERVATIVE FREE 10 ML: 5 INJECTION INTRAVENOUS at 09:36

## 2021-06-15 RX ADMIN — SODIUM CHLORIDE, PRESERVATIVE FREE 20 ML: 5 INJECTION INTRAVENOUS at 13:35

## 2021-06-15 RX ADMIN — HEPARIN 500 UNITS: 100 SYRINGE at 13:35

## 2021-06-17 ENCOUNTER — HOSPITAL ENCOUNTER (OUTPATIENT)
Dept: INFUSION THERAPY | Age: 26
Setting detail: INFUSION SERIES
Discharge: HOME | End: 2021-06-17
Payer: COMMERCIAL

## 2021-06-17 DIAGNOSIS — C85.10 B-CELL LYMPHOMA, UNSPECIFIED B-CELL LYMPHOMA TYPE, UNSPECIFIED BODY REGION (HCC): Primary | ICD-10-CM

## 2021-06-17 LAB
ABO/RH: NORMAL
ANTIBODY SCREEN: NEGATIVE
BANDED NEUTROPHILS ABSOLUTE COUNT: 1.09 K/CU MM
BANDED NEUTROPHILS RELATIVE PERCENT: 31 % (ref 5–11)
COMPONENT: NORMAL
COMPONENT: NORMAL
CROSSMATCH RESULT: NORMAL
DIFFERENTIAL TYPE: ABNORMAL
DOHLE BODIES: PRESENT
EOSINOPHILS ABSOLUTE: 0 K/CU MM
EOSINOPHILS RELATIVE PERCENT: 1 % (ref 0–3)
HCT VFR BLD CALC: 27.2 % (ref 42–52)
HEMOGLOBIN: 9.2 GM/DL (ref 13.5–18)
LYMPHOCYTES ABSOLUTE: 1.1 K/CU MM
LYMPHOCYTES RELATIVE PERCENT: 31 % (ref 24–44)
MCH RBC QN AUTO: 29 PG (ref 27–31)
MCHC RBC AUTO-ENTMCNC: 33.8 % (ref 32–36)
MCV RBC AUTO: 85.8 FL (ref 78–100)
METAMYELOCYTES ABSOLUTE COUNT: 0.11 K/CU MM
METAMYELOCYTES PERCENT: 3 %
MONOCYTES ABSOLUTE: 0.5 K/CU MM
MONOCYTES RELATIVE PERCENT: 15 % (ref 0–4)
PDW BLD-RTO: 12.5 % (ref 11.7–14.9)
PLATELET # BLD: 32 K/CU MM (ref 140–440)
PLT MORPHOLOGY: ABNORMAL
PMV BLD AUTO: 9.6 FL (ref 7.5–11.1)
RBC # BLD: 3.17 M/CU MM (ref 4.6–6.2)
SEGMENTED NEUTROPHILS ABSOLUTE COUNT: 0.7 K/CU MM
SEGMENTED NEUTROPHILS RELATIVE PERCENT: 19 % (ref 36–66)
STATUS: NORMAL
STATUS: NORMAL
TOXIC GRANULATION: PRESENT
TRANSFUSION STATUS: NORMAL
TRANSFUSION STATUS: NORMAL
UNIT DIVISION: 0
UNIT DIVISION: 0
UNIT NUMBER: NORMAL
UNIT NUMBER: NORMAL
WBC # BLD: 3.5 K/CU MM (ref 4–10.5)

## 2021-06-17 PROCEDURE — 99211 OFF/OP EST MAY X REQ PHY/QHP: CPT

## 2021-06-17 PROCEDURE — 85007 BL SMEAR W/DIFF WBC COUNT: CPT

## 2021-06-17 PROCEDURE — 85027 COMPLETE CBC AUTOMATED: CPT

## 2021-06-17 PROCEDURE — 36591 DRAW BLOOD OFF VENOUS DEVICE: CPT

## 2021-06-17 PROCEDURE — 2580000003 HC RX 258: Performed by: NURSE PRACTITIONER

## 2021-06-17 PROCEDURE — 6360000002 HC RX W HCPCS: Performed by: NURSE PRACTITIONER

## 2021-06-17 RX ORDER — SODIUM CHLORIDE 0.9 % (FLUSH) 0.9 %
10 SYRINGE (ML) INJECTION PRN
Status: CANCELLED | OUTPATIENT
Start: 2021-06-21

## 2021-06-17 RX ORDER — 0.9 % SODIUM CHLORIDE 0.9 %
250 INTRAVENOUS SOLUTION INTRAVENOUS ONCE
Status: CANCELLED | OUTPATIENT
Start: 2021-06-21 | End: 2021-06-21

## 2021-06-17 RX ORDER — SODIUM CHLORIDE 0.9 % (FLUSH) 0.9 %
10 SYRINGE (ML) INJECTION PRN
Status: CANCELLED
Start: 2021-06-21

## 2021-06-17 RX ORDER — HEPARIN SODIUM (PORCINE) LOCK FLUSH IV SOLN 100 UNIT/ML 100 UNIT/ML
500 SOLUTION INTRAVENOUS PRN
Status: CANCELLED
Start: 2021-06-21

## 2021-06-17 RX ORDER — SODIUM CHLORIDE 0.9 % (FLUSH) 0.9 %
10 SYRINGE (ML) INJECTION PRN
Status: DISCONTINUED | OUTPATIENT
Start: 2021-06-17 | End: 2021-06-18 | Stop reason: HOSPADM

## 2021-06-17 RX ORDER — HEPARIN SODIUM (PORCINE) LOCK FLUSH IV SOLN 100 UNIT/ML 100 UNIT/ML
500 SOLUTION INTRAVENOUS PRN
Status: DISCONTINUED | OUTPATIENT
Start: 2021-06-17 | End: 2021-06-18 | Stop reason: HOSPADM

## 2021-06-17 RX ADMIN — Medication 10 ML: at 13:08

## 2021-06-17 RX ADMIN — Medication 10 ML: at 13:11

## 2021-06-17 RX ADMIN — HEPARIN 500 UNITS: 100 SYRINGE at 13:11

## 2021-06-17 NOTE — PROGRESS NOTES
Tolerated blood draw from port well. Reviewed discharge instruction, voiced understanding. Copies of AVS given. Pt discharged home. Pt to exit via ambulation with family member at side. Pt aware of results. Will return on Monday for another lab draw.     Orders Placed This Encounter   Medications    sodium chloride flush 0.9 % injection 10 mL    heparin flush 100 UNIT/ML injection 500 Units    sodium chloride flush 0.9 % injection 10 mL

## 2021-06-17 NOTE — PROGRESS NOTES
Tolerated blood draw from port well. Reviewed discharge instruction, voiced understanding. Copies of AVS given. Pt discharged home. Pt to exit via ambulation with family member at side.     Orders Placed This Encounter   Medications    sodium chloride flush 0.9 % injection 10 mL    heparin flush 100 UNIT/ML injection 500 Units    sodium chloride flush 0.9 % injection 10 mL

## 2021-06-21 ENCOUNTER — HOSPITAL ENCOUNTER (OUTPATIENT)
Dept: INFUSION THERAPY | Age: 26
Setting detail: INFUSION SERIES
Discharge: HOME | End: 2021-06-21
Payer: COMMERCIAL

## 2021-06-21 DIAGNOSIS — C85.10 B-CELL LYMPHOMA, UNSPECIFIED B-CELL LYMPHOMA TYPE, UNSPECIFIED BODY REGION (HCC): Primary | ICD-10-CM

## 2021-06-21 LAB
ALBUMIN SERPL-MCNC: 4.3 GM/DL (ref 3.4–5)
ALP BLD-CCNC: 95 IU/L (ref 40–129)
ALT SERPL-CCNC: 19 U/L (ref 10–40)
ANION GAP SERPL CALCULATED.3IONS-SCNC: 10 MMOL/L (ref 4–16)
AST SERPL-CCNC: 13 IU/L (ref 15–37)
BANDED NEUTROPHILS ABSOLUTE COUNT: 1.93 K/CU MM
BANDED NEUTROPHILS RELATIVE PERCENT: 21 % (ref 5–11)
BILIRUB SERPL-MCNC: 0.2 MG/DL (ref 0–1)
BUN BLDV-MCNC: 8 MG/DL (ref 6–23)
CALCIUM SERPL-MCNC: 9.3 MG/DL (ref 8.3–10.6)
CHLORIDE BLD-SCNC: 101 MMOL/L (ref 99–110)
CO2: 28 MMOL/L (ref 21–32)
CREAT SERPL-MCNC: 0.6 MG/DL (ref 0.9–1.3)
DIFFERENTIAL TYPE: ABNORMAL
DOHLE BODIES: PRESENT
GFR AFRICAN AMERICAN: >60 ML/MIN/1.73M2
GFR NON-AFRICAN AMERICAN: >60 ML/MIN/1.73M2
GLUCOSE BLD-MCNC: 111 MG/DL (ref 70–99)
HCT VFR BLD CALC: 24.5 % (ref 42–52)
HEMOGLOBIN: 8.4 GM/DL (ref 13.5–18)
LYMPHOCYTES ABSOLUTE: 1 K/CU MM
LYMPHOCYTES RELATIVE PERCENT: 11 % (ref 24–44)
MCH RBC QN AUTO: 30.1 PG (ref 27–31)
MCHC RBC AUTO-ENTMCNC: 34.3 % (ref 32–36)
MCV RBC AUTO: 87.8 FL (ref 78–100)
METAMYELOCYTES ABSOLUTE COUNT: 0.28 K/CU MM
METAMYELOCYTES PERCENT: 3 %
MONOCYTES ABSOLUTE: 1.5 K/CU MM
MONOCYTES RELATIVE PERCENT: 16 % (ref 0–4)
MYELOCYTE PERCENT: 2 %
MYELOCYTES ABSOLUTE COUNT: 0.18 K/CU MM
PDW BLD-RTO: 13.2 % (ref 11.7–14.9)
PLATELET # BLD: 43 K/CU MM (ref 140–440)
PLT MORPHOLOGY: ABNORMAL
PMV BLD AUTO: 10.5 FL (ref 7.5–11.1)
POLYCHROMASIA: ABNORMAL
POTASSIUM SERPL-SCNC: 3.5 MMOL/L (ref 3.5–5.1)
RBC # BLD: 2.79 M/CU MM (ref 4.6–6.2)
SEGMENTED NEUTROPHILS ABSOLUTE COUNT: 4.3 K/CU MM
SEGMENTED NEUTROPHILS RELATIVE PERCENT: 47 % (ref 36–66)
SODIUM BLD-SCNC: 139 MMOL/L (ref 135–145)
TOTAL PROTEIN: 6 GM/DL (ref 6.4–8.2)
TOXIC GRANULATION: PRESENT
WBC # BLD: 9.2 K/CU MM (ref 4–10.5)

## 2021-06-21 PROCEDURE — 36591 DRAW BLOOD OFF VENOUS DEVICE: CPT

## 2021-06-21 PROCEDURE — 80053 COMPREHEN METABOLIC PANEL: CPT

## 2021-06-21 PROCEDURE — 85007 BL SMEAR W/DIFF WBC COUNT: CPT

## 2021-06-21 PROCEDURE — 2580000003 HC RX 258: Performed by: NURSE PRACTITIONER

## 2021-06-21 PROCEDURE — 85027 COMPLETE CBC AUTOMATED: CPT

## 2021-06-21 PROCEDURE — 6360000002 HC RX W HCPCS: Performed by: NURSE PRACTITIONER

## 2021-06-21 RX ORDER — SODIUM CHLORIDE 0.9 % (FLUSH) 0.9 %
10 SYRINGE (ML) INJECTION PRN
Status: CANCELLED
Start: 2021-06-24

## 2021-06-21 RX ORDER — 0.9 % SODIUM CHLORIDE 0.9 %
250 INTRAVENOUS SOLUTION INTRAVENOUS ONCE
Status: CANCELLED | OUTPATIENT
Start: 2021-06-24 | End: 2021-06-24

## 2021-06-21 RX ORDER — SODIUM CHLORIDE 0.9 % (FLUSH) 0.9 %
10 SYRINGE (ML) INJECTION PRN
Status: DISCONTINUED | OUTPATIENT
Start: 2021-06-21 | End: 2021-06-22 | Stop reason: HOSPADM

## 2021-06-21 RX ORDER — SODIUM CHLORIDE 0.9 % (FLUSH) 0.9 %
10 SYRINGE (ML) INJECTION PRN
Status: CANCELLED | OUTPATIENT
Start: 2021-06-24

## 2021-06-21 RX ORDER — HEPARIN SODIUM (PORCINE) LOCK FLUSH IV SOLN 100 UNIT/ML 100 UNIT/ML
500 SOLUTION INTRAVENOUS PRN
Status: DISCONTINUED | OUTPATIENT
Start: 2021-06-21 | End: 2021-06-22 | Stop reason: HOSPADM

## 2021-06-21 RX ORDER — HEPARIN SODIUM (PORCINE) LOCK FLUSH IV SOLN 100 UNIT/ML 100 UNIT/ML
500 SOLUTION INTRAVENOUS PRN
Status: CANCELLED
Start: 2021-06-24

## 2021-06-21 RX ADMIN — SODIUM CHLORIDE, PRESERVATIVE FREE 20 ML: 5 INJECTION INTRAVENOUS at 13:14

## 2021-06-21 RX ADMIN — SODIUM CHLORIDE, PRESERVATIVE FREE 10 ML: 5 INJECTION INTRAVENOUS at 13:08

## 2021-06-21 RX ADMIN — HEPARIN 500 UNITS: 100 SYRINGE at 13:14

## 2021-06-21 NOTE — PROGRESS NOTES
CALLED RESULTS OF LAB DRAW DONE TODAY. SPOKE WITH PT'S MOM, PT DOES NOT NEED ANY BLOOD PRODUCT AT THE PRESENT TIME. VOICED UNDERSTANDING.

## 2021-06-21 NOTE — PROGRESS NOTES
Tolerated Lab Draw and [place] [facility]. Reviewed discharge instructions, understanding verbalized. Patient discharged home  with family. Down to exit with father at side.

## 2021-07-12 ENCOUNTER — HOSPITAL ENCOUNTER (OUTPATIENT)
Dept: INFUSION THERAPY | Age: 26
Setting detail: INFUSION SERIES
Discharge: HOME | End: 2021-07-12
Payer: COMMERCIAL

## 2021-07-12 VITALS
DIASTOLIC BLOOD PRESSURE: 78 MMHG | SYSTOLIC BLOOD PRESSURE: 116 MMHG | TEMPERATURE: 98.6 F | OXYGEN SATURATION: 97 % | HEART RATE: 103 BPM | RESPIRATION RATE: 16 BRPM

## 2021-07-12 DIAGNOSIS — C85.10 B-CELL LYMPHOMA, UNSPECIFIED B-CELL LYMPHOMA TYPE, UNSPECIFIED BODY REGION (HCC): Primary | ICD-10-CM

## 2021-07-12 LAB
ALBUMIN SERPL-MCNC: 4.4 GM/DL (ref 3.4–5)
ALP BLD-CCNC: 86 IU/L (ref 40–129)
ALT SERPL-CCNC: 31 U/L (ref 10–40)
ANION GAP SERPL CALCULATED.3IONS-SCNC: 11 MMOL/L (ref 4–16)
AST SERPL-CCNC: 21 IU/L (ref 15–37)
BASOPHILS ABSOLUTE: 0 K/CU MM
BASOPHILS RELATIVE PERCENT: 0.6 % (ref 0–1)
BILIRUB SERPL-MCNC: 0.2 MG/DL (ref 0–1)
BUN BLDV-MCNC: 15 MG/DL (ref 6–23)
CALCIUM SERPL-MCNC: 9.4 MG/DL (ref 8.3–10.6)
CHLORIDE BLD-SCNC: 104 MMOL/L (ref 99–110)
CO2: 25 MMOL/L (ref 21–32)
CREAT SERPL-MCNC: 1 MG/DL (ref 0.9–1.3)
DIFFERENTIAL TYPE: ABNORMAL
EOSINOPHILS ABSOLUTE: 0.1 K/CU MM
EOSINOPHILS RELATIVE PERCENT: 1.6 % (ref 0–3)
GFR AFRICAN AMERICAN: >60 ML/MIN/1.73M2
GFR NON-AFRICAN AMERICAN: >60 ML/MIN/1.73M2
GLUCOSE BLD-MCNC: 118 MG/DL (ref 70–99)
HCT VFR BLD CALC: 29.7 % (ref 42–52)
HEMOGLOBIN: 9.6 GM/DL (ref 13.5–18)
IMMATURE NEUTROPHIL %: 0.8 % (ref 0–0.43)
LYMPHOCYTES ABSOLUTE: 0.8 K/CU MM
LYMPHOCYTES RELATIVE PERCENT: 11.6 % (ref 24–44)
MCH RBC QN AUTO: 30.5 PG (ref 27–31)
MCHC RBC AUTO-ENTMCNC: 32.3 % (ref 32–36)
MCV RBC AUTO: 94.3 FL (ref 78–100)
MONOCYTES ABSOLUTE: 1 K/CU MM
MONOCYTES RELATIVE PERCENT: 13.7 % (ref 0–4)
NUCLEATED RBC %: 0 %
PDW BLD-RTO: 18.1 % (ref 11.7–14.9)
PLATELET # BLD: 240 K/CU MM (ref 140–440)
PMV BLD AUTO: 9.2 FL (ref 7.5–11.1)
POTASSIUM SERPL-SCNC: 3.9 MMOL/L (ref 3.5–5.1)
RBC # BLD: 3.15 M/CU MM (ref 4.6–6.2)
SEGMENTED NEUTROPHILS ABSOLUTE COUNT: 5.1 K/CU MM
SEGMENTED NEUTROPHILS RELATIVE PERCENT: 71.7 % (ref 36–66)
SODIUM BLD-SCNC: 140 MMOL/L (ref 135–145)
TOTAL IMMATURE NEUTOROPHIL: 0.06 K/CU MM
TOTAL NUCLEATED RBC: 0 K/CU MM
TOTAL PROTEIN: 6.3 GM/DL (ref 6.4–8.2)
WBC # BLD: 7.1 K/CU MM (ref 4–10.5)

## 2021-07-12 PROCEDURE — 36591 DRAW BLOOD OFF VENOUS DEVICE: CPT

## 2021-07-12 PROCEDURE — 6360000002 HC RX W HCPCS: Performed by: NURSE PRACTITIONER

## 2021-07-12 PROCEDURE — 80053 COMPREHEN METABOLIC PANEL: CPT

## 2021-07-12 PROCEDURE — 85025 COMPLETE CBC W/AUTO DIFF WBC: CPT

## 2021-07-12 PROCEDURE — 99211 OFF/OP EST MAY X REQ PHY/QHP: CPT

## 2021-07-12 PROCEDURE — 2580000003 HC RX 258: Performed by: NURSE PRACTITIONER

## 2021-07-12 RX ORDER — SODIUM CHLORIDE 0.9 % (FLUSH) 0.9 %
10 SYRINGE (ML) INJECTION PRN
Status: DISCONTINUED | OUTPATIENT
Start: 2021-07-12 | End: 2021-07-13 | Stop reason: HOSPADM

## 2021-07-12 RX ORDER — HEPARIN SODIUM (PORCINE) LOCK FLUSH IV SOLN 100 UNIT/ML 100 UNIT/ML
500 SOLUTION INTRAVENOUS PRN
Status: CANCELLED
Start: 2021-07-15

## 2021-07-12 RX ORDER — HEPARIN SODIUM (PORCINE) LOCK FLUSH IV SOLN 100 UNIT/ML 100 UNIT/ML
500 SOLUTION INTRAVENOUS PRN
Status: DISCONTINUED | OUTPATIENT
Start: 2021-07-12 | End: 2021-07-13 | Stop reason: HOSPADM

## 2021-07-12 RX ORDER — 0.9 % SODIUM CHLORIDE 0.9 %
250 INTRAVENOUS SOLUTION INTRAVENOUS ONCE
Status: CANCELLED | OUTPATIENT
Start: 2021-07-15 | End: 2021-07-15

## 2021-07-12 RX ORDER — SODIUM CHLORIDE 0.9 % (FLUSH) 0.9 %
10 SYRINGE (ML) INJECTION PRN
Status: CANCELLED | OUTPATIENT
Start: 2021-07-15

## 2021-07-12 RX ORDER — SODIUM CHLORIDE 0.9 % (FLUSH) 0.9 %
10 SYRINGE (ML) INJECTION PRN
Status: CANCELLED
Start: 2021-07-15

## 2021-07-12 RX ADMIN — HEPARIN 500 UNITS: 100 SYRINGE at 10:51

## 2021-07-12 RX ADMIN — SODIUM CHLORIDE, PRESERVATIVE FREE 10 ML: 5 INJECTION INTRAVENOUS at 10:50

## 2021-07-12 NOTE — PROGRESS NOTES
Tolerated Port draw well. Reviewed discharge instruction, voiced understanding. Copies of AVS given. Pt discharged home. Pt to exit via ambulation.     Orders Placed This Encounter   Medications    sodium chloride flush 0.9 % injection 10 mL    heparin flush 100 UNIT/ML injection 500 Units

## 2021-07-12 NOTE — PROGRESS NOTES
Called mother and informed that pt doesn't need blood product per standing orders. Pt has appt for redraw on Thursday.

## 2021-07-15 ENCOUNTER — HOSPITAL ENCOUNTER (OUTPATIENT)
Dept: INFUSION THERAPY | Age: 26
Setting detail: INFUSION SERIES
Discharge: HOME | End: 2021-07-15
Payer: COMMERCIAL

## 2021-07-15 VITALS
TEMPERATURE: 97.3 F | HEART RATE: 98 BPM | OXYGEN SATURATION: 98 % | SYSTOLIC BLOOD PRESSURE: 120 MMHG | RESPIRATION RATE: 16 BRPM | DIASTOLIC BLOOD PRESSURE: 80 MMHG

## 2021-07-15 DIAGNOSIS — C85.10 B-CELL LYMPHOMA, UNSPECIFIED B-CELL LYMPHOMA TYPE, UNSPECIFIED BODY REGION (HCC): Primary | ICD-10-CM

## 2021-07-15 LAB
BASOPHILS ABSOLUTE: 0.1 K/CU MM
BASOPHILS RELATIVE PERCENT: 0.7 % (ref 0–1)
DIFFERENTIAL TYPE: ABNORMAL
EOSINOPHILS ABSOLUTE: 0.2 K/CU MM
EOSINOPHILS RELATIVE PERCENT: 2.2 % (ref 0–3)
HCT VFR BLD CALC: 32.2 % (ref 42–52)
HEMOGLOBIN: 10.4 GM/DL (ref 13.5–18)
IMMATURE NEUTROPHIL %: 0.7 % (ref 0–0.43)
LYMPHOCYTES ABSOLUTE: 0.8 K/CU MM
LYMPHOCYTES RELATIVE PERCENT: 10.8 % (ref 24–44)
MCH RBC QN AUTO: 30.6 PG (ref 27–31)
MCHC RBC AUTO-ENTMCNC: 32.3 % (ref 32–36)
MCV RBC AUTO: 94.7 FL (ref 78–100)
MONOCYTES ABSOLUTE: 0.9 K/CU MM
MONOCYTES RELATIVE PERCENT: 11.9 % (ref 0–4)
NUCLEATED RBC %: 0 %
PDW BLD-RTO: 17.2 % (ref 11.7–14.9)
PLATELET # BLD: 250 K/CU MM (ref 140–440)
PMV BLD AUTO: 9 FL (ref 7.5–11.1)
RBC # BLD: 3.4 M/CU MM (ref 4.6–6.2)
SEGMENTED NEUTROPHILS ABSOLUTE COUNT: 5.3 K/CU MM
SEGMENTED NEUTROPHILS RELATIVE PERCENT: 73.7 % (ref 36–66)
TOTAL IMMATURE NEUTOROPHIL: 0.05 K/CU MM
TOTAL NUCLEATED RBC: 0 K/CU MM
WBC # BLD: 7.1 K/CU MM (ref 4–10.5)

## 2021-07-15 PROCEDURE — 85025 COMPLETE CBC W/AUTO DIFF WBC: CPT

## 2021-07-15 PROCEDURE — 99211 OFF/OP EST MAY X REQ PHY/QHP: CPT

## 2021-07-15 PROCEDURE — 36591 DRAW BLOOD OFF VENOUS DEVICE: CPT

## 2021-07-15 PROCEDURE — 2580000003 HC RX 258: Performed by: NURSE PRACTITIONER

## 2021-07-15 PROCEDURE — 6360000002 HC RX W HCPCS: Performed by: NURSE PRACTITIONER

## 2021-07-15 RX ORDER — SODIUM CHLORIDE 0.9 % (FLUSH) 0.9 %
10 SYRINGE (ML) INJECTION PRN
Status: CANCELLED | OUTPATIENT
Start: 2021-07-19

## 2021-07-15 RX ORDER — 0.9 % SODIUM CHLORIDE 0.9 %
250 INTRAVENOUS SOLUTION INTRAVENOUS ONCE
Status: CANCELLED | OUTPATIENT
Start: 2021-07-19 | End: 2021-07-19

## 2021-07-15 RX ORDER — SODIUM CHLORIDE 0.9 % (FLUSH) 0.9 %
10 SYRINGE (ML) INJECTION PRN
Status: DISCONTINUED | OUTPATIENT
Start: 2021-07-15 | End: 2021-07-16 | Stop reason: HOSPADM

## 2021-07-15 RX ORDER — HEPARIN SODIUM (PORCINE) LOCK FLUSH IV SOLN 100 UNIT/ML 100 UNIT/ML
500 SOLUTION INTRAVENOUS PRN
Status: CANCELLED
Start: 2021-07-19

## 2021-07-15 RX ORDER — HEPARIN SODIUM (PORCINE) LOCK FLUSH IV SOLN 100 UNIT/ML 100 UNIT/ML
500 SOLUTION INTRAVENOUS PRN
Status: DISCONTINUED | OUTPATIENT
Start: 2021-07-15 | End: 2021-07-16 | Stop reason: HOSPADM

## 2021-07-15 RX ORDER — SODIUM CHLORIDE 0.9 % (FLUSH) 0.9 %
10 SYRINGE (ML) INJECTION PRN
Status: CANCELLED
Start: 2021-07-19

## 2021-07-15 RX ADMIN — HEPARIN 500 UNITS: 100 SYRINGE at 13:58

## 2021-07-15 RX ADMIN — SODIUM CHLORIDE, PRESERVATIVE FREE 10 ML: 5 INJECTION INTRAVENOUS at 13:58

## 2021-07-15 NOTE — PROGRESS NOTES
Spoke with mother and informed that pt doesn't need blood product tomorrow as per standing orders. Faxed results to referring provider via Epic.

## 2021-07-15 NOTE — PROGRESS NOTES
Prior to discharge, the After Visit Summary Discharge Instructions were reviewed with the patient. He was offered a printed version of the AVS, but declined the offer. Recurrent appointment, pt tolerated infusion well. Pt discharged home. Pt to exit via steady gait.     Orders Placed This Encounter   Medications    heparin flush 100 UNIT/ML injection 500 Units    sodium chloride flush 0.9 % injection 10 mL

## 2021-07-20 ENCOUNTER — HOSPITAL ENCOUNTER (OUTPATIENT)
Dept: INFUSION THERAPY | Age: 26
Setting detail: INFUSION SERIES
Discharge: HOME | End: 2021-07-20
Payer: COMMERCIAL

## 2021-07-20 VITALS
OXYGEN SATURATION: 97 % | DIASTOLIC BLOOD PRESSURE: 71 MMHG | HEART RATE: 88 BPM | SYSTOLIC BLOOD PRESSURE: 114 MMHG | RESPIRATION RATE: 16 BRPM | TEMPERATURE: 97.3 F

## 2021-07-20 DIAGNOSIS — C85.10 B-CELL LYMPHOMA, UNSPECIFIED B-CELL LYMPHOMA TYPE, UNSPECIFIED BODY REGION (HCC): Primary | ICD-10-CM

## 2021-07-20 LAB
ALBUMIN SERPL-MCNC: 4.3 GM/DL (ref 3.4–5)
ALP BLD-CCNC: 72 IU/L (ref 40–128)
ALT SERPL-CCNC: 12 U/L (ref 10–40)
ANION GAP SERPL CALCULATED.3IONS-SCNC: 10 MMOL/L (ref 4–16)
AST SERPL-CCNC: 13 IU/L (ref 15–37)
BASOPHILS ABSOLUTE: 0.1 K/CU MM
BASOPHILS RELATIVE PERCENT: 0.9 % (ref 0–1)
BILIRUB SERPL-MCNC: 0.2 MG/DL (ref 0–1)
BUN BLDV-MCNC: 9 MG/DL (ref 6–23)
CALCIUM SERPL-MCNC: 9.7 MG/DL (ref 8.3–10.6)
CHLORIDE BLD-SCNC: 105 MMOL/L (ref 99–110)
CO2: 27 MMOL/L (ref 21–32)
CREAT SERPL-MCNC: 1.1 MG/DL (ref 0.9–1.3)
DIFFERENTIAL TYPE: ABNORMAL
EOSINOPHILS ABSOLUTE: 0.2 K/CU MM
EOSINOPHILS RELATIVE PERCENT: 2.2 % (ref 0–3)
GFR AFRICAN AMERICAN: >60 ML/MIN/1.73M2
GFR NON-AFRICAN AMERICAN: >60 ML/MIN/1.73M2
GLUCOSE BLD-MCNC: 101 MG/DL (ref 70–99)
HCT VFR BLD CALC: 33 % (ref 42–52)
HEMOGLOBIN: 10.8 GM/DL (ref 13.5–18)
IMMATURE NEUTROPHIL %: 0.9 % (ref 0–0.43)
LYMPHOCYTES ABSOLUTE: 0.9 K/CU MM
LYMPHOCYTES RELATIVE PERCENT: 12.6 % (ref 24–44)
MCH RBC QN AUTO: 31.2 PG (ref 27–31)
MCHC RBC AUTO-ENTMCNC: 32.7 % (ref 32–36)
MCV RBC AUTO: 95.4 FL (ref 78–100)
MONOCYTES ABSOLUTE: 0.9 K/CU MM
MONOCYTES RELATIVE PERCENT: 13.5 % (ref 0–4)
NUCLEATED RBC %: 0 %
PDW BLD-RTO: 16.3 % (ref 11.7–14.9)
PLATELET # BLD: 183 K/CU MM (ref 140–440)
PMV BLD AUTO: 9 FL (ref 7.5–11.1)
POTASSIUM SERPL-SCNC: 3.8 MMOL/L (ref 3.5–5.1)
RBC # BLD: 3.46 M/CU MM (ref 4.6–6.2)
SEGMENTED NEUTROPHILS ABSOLUTE COUNT: 4.8 K/CU MM
SEGMENTED NEUTROPHILS RELATIVE PERCENT: 69.9 % (ref 36–66)
SODIUM BLD-SCNC: 142 MMOL/L (ref 135–145)
TOTAL IMMATURE NEUTOROPHIL: 0.06 K/CU MM
TOTAL NUCLEATED RBC: 0 K/CU MM
TOTAL PROTEIN: 6.6 GM/DL (ref 6.4–8.2)
WBC # BLD: 6.9 K/CU MM (ref 4–10.5)

## 2021-07-20 PROCEDURE — 36591 DRAW BLOOD OFF VENOUS DEVICE: CPT

## 2021-07-20 PROCEDURE — 80053 COMPREHEN METABOLIC PANEL: CPT

## 2021-07-20 PROCEDURE — 85025 COMPLETE CBC W/AUTO DIFF WBC: CPT

## 2021-07-20 PROCEDURE — 2580000003 HC RX 258: Performed by: NURSE PRACTITIONER

## 2021-07-20 PROCEDURE — 6360000002 HC RX W HCPCS: Performed by: NURSE PRACTITIONER

## 2021-07-20 RX ORDER — SODIUM CHLORIDE 0.9 % (FLUSH) 0.9 %
10 SYRINGE (ML) INJECTION PRN
Status: CANCELLED | OUTPATIENT
Start: 2021-07-22

## 2021-07-20 RX ORDER — SODIUM CHLORIDE 0.9 % (FLUSH) 0.9 %
10 SYRINGE (ML) INJECTION PRN
Status: DISCONTINUED | OUTPATIENT
Start: 2021-07-20 | End: 2021-07-21 | Stop reason: HOSPADM

## 2021-07-20 RX ORDER — HEPARIN SODIUM (PORCINE) LOCK FLUSH IV SOLN 100 UNIT/ML 100 UNIT/ML
500 SOLUTION INTRAVENOUS PRN
Status: CANCELLED
Start: 2021-07-22

## 2021-07-20 RX ORDER — 0.9 % SODIUM CHLORIDE 0.9 %
250 INTRAVENOUS SOLUTION INTRAVENOUS ONCE
Status: CANCELLED | OUTPATIENT
Start: 2021-07-22 | End: 2021-07-22

## 2021-07-20 RX ORDER — SODIUM CHLORIDE 0.9 % (FLUSH) 0.9 %
10 SYRINGE (ML) INJECTION PRN
Status: CANCELLED
Start: 2021-07-22

## 2021-07-20 RX ORDER — HEPARIN SODIUM (PORCINE) LOCK FLUSH IV SOLN 100 UNIT/ML 100 UNIT/ML
500 SOLUTION INTRAVENOUS PRN
Status: DISCONTINUED | OUTPATIENT
Start: 2021-07-20 | End: 2021-07-21 | Stop reason: HOSPADM

## 2021-07-20 RX ADMIN — SODIUM CHLORIDE, PRESERVATIVE FREE 10 ML: 5 INJECTION INTRAVENOUS at 13:45

## 2021-07-20 RX ADMIN — SODIUM CHLORIDE, PRESERVATIVE FREE 10 ML: 5 INJECTION INTRAVENOUS at 13:46

## 2021-07-20 RX ADMIN — HEPARIN 500 UNITS: 100 SYRINGE at 13:47

## 2021-07-20 NOTE — DISCHARGE SUMMARY
Tolerated Lab draw an [employer]. Reviewed discharge instructions, understanding verbalized. Copies of AVS given to take home. Patient discharged home. Down to exit per self.     Orders Placed This Encounter   Medications    sodium chloride flush 0.9 % injection 10 mL    heparin flush 100 UNIT/ML injection 500 Units

## 2021-07-20 NOTE — PLAN OF CARE
Ambulatory to unit room 3 for Lab Draw and [place]. Reorientated to unit. Procedure and plan of care explained. Questions answered. Understanding verbalized.

## 2021-07-22 ENCOUNTER — HOSPITAL ENCOUNTER (OUTPATIENT)
Dept: INFUSION THERAPY | Age: 26
Setting detail: INFUSION SERIES
Discharge: HOME | End: 2021-07-22
Payer: COMMERCIAL

## 2021-07-22 DIAGNOSIS — C85.10 B-CELL LYMPHOMA, UNSPECIFIED B-CELL LYMPHOMA TYPE, UNSPECIFIED BODY REGION (HCC): Primary | ICD-10-CM

## 2021-07-22 LAB
BASOPHILS ABSOLUTE: 0.1 K/CU MM
BASOPHILS RELATIVE PERCENT: 0.7 % (ref 0–1)
DIFFERENTIAL TYPE: ABNORMAL
EOSINOPHILS ABSOLUTE: 0.2 K/CU MM
EOSINOPHILS RELATIVE PERCENT: 2.6 % (ref 0–3)
HCT VFR BLD CALC: 33.1 % (ref 42–52)
HEMOGLOBIN: 11 GM/DL (ref 13.5–18)
IMMATURE NEUTROPHIL %: 0.8 % (ref 0–0.43)
LYMPHOCYTES ABSOLUTE: 1 K/CU MM
LYMPHOCYTES RELATIVE PERCENT: 11.3 % (ref 24–44)
MCH RBC QN AUTO: 31.5 PG (ref 27–31)
MCHC RBC AUTO-ENTMCNC: 33.2 % (ref 32–36)
MCV RBC AUTO: 94.8 FL (ref 78–100)
MONOCYTES ABSOLUTE: 0.9 K/CU MM
MONOCYTES RELATIVE PERCENT: 10.1 % (ref 0–4)
NUCLEATED RBC %: 0 %
PDW BLD-RTO: 15.8 % (ref 11.7–14.9)
PLATELET # BLD: 173 K/CU MM (ref 140–440)
PMV BLD AUTO: 9.3 FL (ref 7.5–11.1)
RBC # BLD: 3.49 M/CU MM (ref 4.6–6.2)
SEGMENTED NEUTROPHILS ABSOLUTE COUNT: 6.3 K/CU MM
SEGMENTED NEUTROPHILS RELATIVE PERCENT: 74.5 % (ref 36–66)
TOTAL IMMATURE NEUTOROPHIL: 0.07 K/CU MM
TOTAL NUCLEATED RBC: 0 K/CU MM
WBC # BLD: 8.4 K/CU MM (ref 4–10.5)

## 2021-07-22 PROCEDURE — 85025 COMPLETE CBC W/AUTO DIFF WBC: CPT

## 2021-07-22 PROCEDURE — 36591 DRAW BLOOD OFF VENOUS DEVICE: CPT

## 2021-07-22 PROCEDURE — 6360000002 HC RX W HCPCS: Performed by: NURSE PRACTITIONER

## 2021-07-22 PROCEDURE — 2580000003 HC RX 258: Performed by: NURSE PRACTITIONER

## 2021-07-22 RX ORDER — SODIUM CHLORIDE 0.9 % (FLUSH) 0.9 %
10 SYRINGE (ML) INJECTION PRN
Status: CANCELLED | OUTPATIENT
Start: 2021-07-26

## 2021-07-22 RX ORDER — HEPARIN SODIUM (PORCINE) LOCK FLUSH IV SOLN 100 UNIT/ML 100 UNIT/ML
500 SOLUTION INTRAVENOUS PRN
Status: DISCONTINUED | OUTPATIENT
Start: 2021-07-22 | End: 2021-07-23 | Stop reason: HOSPADM

## 2021-07-22 RX ORDER — SODIUM CHLORIDE 0.9 % (FLUSH) 0.9 %
10 SYRINGE (ML) INJECTION PRN
Status: DISCONTINUED | OUTPATIENT
Start: 2021-07-22 | End: 2021-07-23 | Stop reason: HOSPADM

## 2021-07-22 RX ORDER — SODIUM CHLORIDE 0.9 % (FLUSH) 0.9 %
10 SYRINGE (ML) INJECTION PRN
Status: CANCELLED
Start: 2021-07-26

## 2021-07-22 RX ORDER — 0.9 % SODIUM CHLORIDE 0.9 %
250 INTRAVENOUS SOLUTION INTRAVENOUS ONCE
Status: CANCELLED | OUTPATIENT
Start: 2021-07-26 | End: 2021-07-26

## 2021-07-22 RX ORDER — HEPARIN SODIUM (PORCINE) LOCK FLUSH IV SOLN 100 UNIT/ML 100 UNIT/ML
500 SOLUTION INTRAVENOUS PRN
Status: CANCELLED
Start: 2021-07-26

## 2021-07-22 RX ADMIN — HEPARIN 500 UNITS: 100 SYRINGE at 13:15

## 2021-07-22 RX ADMIN — SODIUM CHLORIDE, PRESERVATIVE FREE 10 ML: 5 INJECTION INTRAVENOUS at 13:15

## 2021-07-22 RX ADMIN — SODIUM CHLORIDE, PRESERVATIVE FREE 10 ML: 5 INJECTION INTRAVENOUS at 13:14

## 2021-07-22 NOTE — PROGRESS NOTES
Called pt's mother and informed pt doesn't need any blood products. Faxed results to referring provided via epic.

## 2021-07-22 NOTE — PROGRESS NOTES
Prior to discharge, the After Visit Summary Discharge Instructions were reviewed with the patient. He was offered a printed version of the AVS, but declined the offer. Recurrent appointment, pt tolerated infusion well. Pt discharged home. Pt to exit via steady gait.     Orders Placed This Encounter   Medications    sodium chloride flush 0.9 % injection 10 mL    heparin flush 100 UNIT/ML injection 500 Units    sodium chloride flush 0.9 % injection 10 mL

## 2021-07-26 ENCOUNTER — HOSPITAL ENCOUNTER (OUTPATIENT)
Dept: INFUSION THERAPY | Age: 26
Setting detail: INFUSION SERIES
Discharge: HOME | End: 2021-07-26
Payer: COMMERCIAL

## 2021-07-26 VITALS
OXYGEN SATURATION: 97 % | SYSTOLIC BLOOD PRESSURE: 118 MMHG | HEART RATE: 102 BPM | TEMPERATURE: 98.6 F | DIASTOLIC BLOOD PRESSURE: 95 MMHG | RESPIRATION RATE: 16 BRPM

## 2021-07-26 DIAGNOSIS — C85.10 B-CELL LYMPHOMA, UNSPECIFIED B-CELL LYMPHOMA TYPE, UNSPECIFIED BODY REGION (HCC): Primary | ICD-10-CM

## 2021-07-26 LAB
ALBUMIN SERPL-MCNC: 4.2 GM/DL (ref 3.4–5)
ALP BLD-CCNC: 80 IU/L (ref 40–128)
ALT SERPL-CCNC: 10 U/L (ref 10–40)
ANION GAP SERPL CALCULATED.3IONS-SCNC: 12 MMOL/L (ref 4–16)
AST SERPL-CCNC: 13 IU/L (ref 15–37)
BASOPHILS ABSOLUTE: 0.1 K/CU MM
BASOPHILS RELATIVE PERCENT: 0.7 % (ref 0–1)
BILIRUB SERPL-MCNC: 0.2 MG/DL (ref 0–1)
BUN BLDV-MCNC: 8 MG/DL (ref 6–23)
CALCIUM SERPL-MCNC: 9.5 MG/DL (ref 8.3–10.6)
CHLORIDE BLD-SCNC: 103 MMOL/L (ref 99–110)
CO2: 26 MMOL/L (ref 21–32)
CREAT SERPL-MCNC: 0.9 MG/DL (ref 0.9–1.3)
DIFFERENTIAL TYPE: ABNORMAL
EOSINOPHILS ABSOLUTE: 0.2 K/CU MM
EOSINOPHILS RELATIVE PERCENT: 1.8 % (ref 0–3)
GFR AFRICAN AMERICAN: >60 ML/MIN/1.73M2
GFR NON-AFRICAN AMERICAN: >60 ML/MIN/1.73M2
GLUCOSE BLD-MCNC: 94 MG/DL (ref 70–99)
HCT VFR BLD CALC: 34.3 % (ref 42–52)
HEMOGLOBIN: 11.2 GM/DL (ref 13.5–18)
IMMATURE NEUTROPHIL %: 0.5 % (ref 0–0.43)
LYMPHOCYTES ABSOLUTE: 1.1 K/CU MM
LYMPHOCYTES RELATIVE PERCENT: 10.3 % (ref 24–44)
MCH RBC QN AUTO: 30.8 PG (ref 27–31)
MCHC RBC AUTO-ENTMCNC: 32.7 % (ref 32–36)
MCV RBC AUTO: 94.2 FL (ref 78–100)
MONOCYTES ABSOLUTE: 0.9 K/CU MM
MONOCYTES RELATIVE PERCENT: 8.5 % (ref 0–4)
NUCLEATED RBC %: 0 %
PDW BLD-RTO: 15.1 % (ref 11.7–14.9)
PLATELET # BLD: 166 K/CU MM (ref 140–440)
PMV BLD AUTO: 9.6 FL (ref 7.5–11.1)
POTASSIUM SERPL-SCNC: 3.6 MMOL/L (ref 3.5–5.1)
RBC # BLD: 3.64 M/CU MM (ref 4.6–6.2)
SEGMENTED NEUTROPHILS ABSOLUTE COUNT: 8 K/CU MM
SEGMENTED NEUTROPHILS RELATIVE PERCENT: 78.2 % (ref 36–66)
SODIUM BLD-SCNC: 141 MMOL/L (ref 135–145)
TOTAL IMMATURE NEUTOROPHIL: 0.05 K/CU MM
TOTAL NUCLEATED RBC: 0 K/CU MM
TOTAL PROTEIN: 6.4 GM/DL (ref 6.4–8.2)
WBC # BLD: 10.2 K/CU MM (ref 4–10.5)

## 2021-07-26 PROCEDURE — 36591 DRAW BLOOD OFF VENOUS DEVICE: CPT

## 2021-07-26 PROCEDURE — 80053 COMPREHEN METABOLIC PANEL: CPT

## 2021-07-26 PROCEDURE — 85025 COMPLETE CBC W/AUTO DIFF WBC: CPT

## 2021-07-26 PROCEDURE — 2580000003 HC RX 258: Performed by: NURSE PRACTITIONER

## 2021-07-26 PROCEDURE — 6360000002 HC RX W HCPCS: Performed by: NURSE PRACTITIONER

## 2021-07-26 PROCEDURE — 99211 OFF/OP EST MAY X REQ PHY/QHP: CPT

## 2021-07-26 RX ORDER — HEPARIN SODIUM (PORCINE) LOCK FLUSH IV SOLN 100 UNIT/ML 100 UNIT/ML
500 SOLUTION INTRAVENOUS PRN
Status: DISCONTINUED | OUTPATIENT
Start: 2021-07-26 | End: 2021-07-27 | Stop reason: HOSPADM

## 2021-07-26 RX ORDER — HEPARIN SODIUM (PORCINE) LOCK FLUSH IV SOLN 100 UNIT/ML 100 UNIT/ML
500 SOLUTION INTRAVENOUS PRN
Status: CANCELLED
Start: 2021-07-29

## 2021-07-26 RX ORDER — 0.9 % SODIUM CHLORIDE 0.9 %
250 INTRAVENOUS SOLUTION INTRAVENOUS ONCE
Status: CANCELLED | OUTPATIENT
Start: 2021-07-29 | End: 2021-07-29

## 2021-07-26 RX ORDER — SODIUM CHLORIDE 0.9 % (FLUSH) 0.9 %
10 SYRINGE (ML) INJECTION PRN
Status: CANCELLED | OUTPATIENT
Start: 2021-07-29

## 2021-07-26 RX ORDER — SODIUM CHLORIDE 0.9 % (FLUSH) 0.9 %
10 SYRINGE (ML) INJECTION PRN
Status: CANCELLED
Start: 2021-07-29

## 2021-07-26 RX ORDER — SODIUM CHLORIDE 0.9 % (FLUSH) 0.9 %
10 SYRINGE (ML) INJECTION PRN
Status: DISCONTINUED | OUTPATIENT
Start: 2021-07-26 | End: 2021-07-27 | Stop reason: HOSPADM

## 2021-07-26 RX ADMIN — SODIUM CHLORIDE, PRESERVATIVE FREE 10 ML: 5 INJECTION INTRAVENOUS at 13:19

## 2021-07-26 RX ADMIN — HEPARIN 500 UNITS: 100 SYRINGE at 13:19

## 2021-07-26 NOTE — PROGRESS NOTES
Pt taken to room 03 for mediport lab draw. Pt oriented to room, call light, bed/chair controls, TV, pt voiced understanding. Plan of care explained to pt, pt voiced understanding.

## 2021-07-26 NOTE — PROGRESS NOTES
Kathleen deaccessed. DSD applied. Pt tolerated well. Discharged instructions given to pt, pt voiced understanding. Pt discharged via ambulatory by self with friend to exit.

## 2021-07-29 ENCOUNTER — TELEPHONE (OUTPATIENT)
Dept: INFUSION THERAPY | Age: 26
End: 2021-07-29

## 2021-07-29 ENCOUNTER — HOSPITAL ENCOUNTER (OUTPATIENT)
Dept: INFUSION THERAPY | Age: 26
Setting detail: INFUSION SERIES
Discharge: HOME | End: 2021-07-29
Payer: COMMERCIAL

## 2021-07-29 DIAGNOSIS — C85.10 B-CELL LYMPHOMA, UNSPECIFIED B-CELL LYMPHOMA TYPE, UNSPECIFIED BODY REGION (HCC): Primary | ICD-10-CM

## 2021-07-29 LAB
BASOPHILS ABSOLUTE: 0.1 K/CU MM
BASOPHILS RELATIVE PERCENT: 0.6 % (ref 0–1)
DIFFERENTIAL TYPE: ABNORMAL
EOSINOPHILS ABSOLUTE: 0.2 K/CU MM
EOSINOPHILS RELATIVE PERCENT: 2.3 % (ref 0–3)
HCT VFR BLD CALC: 33.4 % (ref 42–52)
HEMOGLOBIN: 11.4 GM/DL (ref 13.5–18)
IMMATURE NEUTROPHIL %: 0.5 % (ref 0–0.43)
LYMPHOCYTES ABSOLUTE: 0.8 K/CU MM
LYMPHOCYTES RELATIVE PERCENT: 8.5 % (ref 24–44)
MCH RBC QN AUTO: 31.8 PG (ref 27–31)
MCHC RBC AUTO-ENTMCNC: 34.1 % (ref 32–36)
MCV RBC AUTO: 93.3 FL (ref 78–100)
MONOCYTES ABSOLUTE: 0.9 K/CU MM
MONOCYTES RELATIVE PERCENT: 9 % (ref 0–4)
NUCLEATED RBC %: 0 %
PDW BLD-RTO: 15 % (ref 11.7–14.9)
PLATELET # BLD: 144 K/CU MM (ref 140–440)
PMV BLD AUTO: 9.2 FL (ref 7.5–11.1)
RBC # BLD: 3.58 M/CU MM (ref 4.6–6.2)
SEGMENTED NEUTROPHILS ABSOLUTE COUNT: 7.5 K/CU MM
SEGMENTED NEUTROPHILS RELATIVE PERCENT: 79.1 % (ref 36–66)
TOTAL IMMATURE NEUTOROPHIL: 0.05 K/CU MM
TOTAL NUCLEATED RBC: 0 K/CU MM
WBC # BLD: 9.4 K/CU MM (ref 4–10.5)

## 2021-07-29 PROCEDURE — 85025 COMPLETE CBC W/AUTO DIFF WBC: CPT

## 2021-07-29 PROCEDURE — 6360000002 HC RX W HCPCS: Performed by: NURSE PRACTITIONER

## 2021-07-29 PROCEDURE — 36591 DRAW BLOOD OFF VENOUS DEVICE: CPT

## 2021-07-29 PROCEDURE — 2580000003 HC RX 258: Performed by: NURSE PRACTITIONER

## 2021-07-29 RX ORDER — SODIUM CHLORIDE 0.9 % (FLUSH) 0.9 %
10 SYRINGE (ML) INJECTION PRN
Status: DISCONTINUED | OUTPATIENT
Start: 2021-07-29 | End: 2021-07-30 | Stop reason: HOSPADM

## 2021-07-29 RX ORDER — SODIUM CHLORIDE 0.9 % (FLUSH) 0.9 %
10 SYRINGE (ML) INJECTION PRN
Status: CANCELLED
Start: 2021-08-02

## 2021-07-29 RX ORDER — HEPARIN SODIUM (PORCINE) LOCK FLUSH IV SOLN 100 UNIT/ML 100 UNIT/ML
500 SOLUTION INTRAVENOUS PRN
Status: DISCONTINUED | OUTPATIENT
Start: 2021-07-29 | End: 2021-07-30 | Stop reason: HOSPADM

## 2021-07-29 RX ORDER — SODIUM CHLORIDE 0.9 % (FLUSH) 0.9 %
10 SYRINGE (ML) INJECTION PRN
Status: CANCELLED | OUTPATIENT
Start: 2021-08-02

## 2021-07-29 RX ORDER — 0.9 % SODIUM CHLORIDE 0.9 %
250 INTRAVENOUS SOLUTION INTRAVENOUS ONCE
Status: CANCELLED | OUTPATIENT
Start: 2021-08-02 | End: 2021-08-02

## 2021-07-29 RX ORDER — HEPARIN SODIUM (PORCINE) LOCK FLUSH IV SOLN 100 UNIT/ML 100 UNIT/ML
500 SOLUTION INTRAVENOUS PRN
Status: CANCELLED
Start: 2021-08-02

## 2021-07-29 RX ADMIN — SODIUM CHLORIDE, PRESERVATIVE FREE 10 ML: 5 INJECTION INTRAVENOUS at 13:50

## 2021-07-29 RX ADMIN — SODIUM CHLORIDE, PRESERVATIVE FREE 10 ML: 5 INJECTION INTRAVENOUS at 13:47

## 2021-07-29 RX ADMIN — HEPARIN 500 UNITS: 100 SYRINGE at 13:50

## 2021-07-29 NOTE — TELEPHONE ENCOUNTER
CALLED PT'S MOTHER AND NOTIFIED THAT ROBERT DID NOT NEED ANY PRODUCT TOMORROW. VOICED UNDERSTANDING.

## 2021-07-29 NOTE — PROGRESS NOTES
Tolerated port draw well. Reviewed discharge instruction, voiced understanding. Copies of AVS given. Pt discharged home, given band code for blood. Advised to bring back if he is in need of blood. Pt to exit via ambulation with family at side. .    Orders Placed This Encounter   Medications    sodium chloride flush 0.9 % injection 10 mL    heparin flush 100 UNIT/ML injection 500 Units    sodium chloride flush 0.9 % injection 10 mL

## 2021-08-02 ENCOUNTER — HOSPITAL ENCOUNTER (OUTPATIENT)
Dept: INFUSION THERAPY | Age: 26
Setting detail: INFUSION SERIES
Discharge: HOME | End: 2021-08-02
Payer: COMMERCIAL

## 2021-08-02 DIAGNOSIS — C85.10 B-CELL LYMPHOMA, UNSPECIFIED B-CELL LYMPHOMA TYPE, UNSPECIFIED BODY REGION (HCC): Primary | ICD-10-CM

## 2021-08-02 LAB
ALBUMIN SERPL-MCNC: 4.6 GM/DL (ref 3.4–5)
ALP BLD-CCNC: 78 IU/L (ref 40–128)
ALT SERPL-CCNC: 11 U/L (ref 10–40)
ANION GAP SERPL CALCULATED.3IONS-SCNC: 12 MMOL/L (ref 4–16)
AST SERPL-CCNC: 15 IU/L (ref 15–37)
BASOPHILS ABSOLUTE: 0.1 K/CU MM
BASOPHILS RELATIVE PERCENT: 0.7 % (ref 0–1)
BILIRUB SERPL-MCNC: 0.2 MG/DL (ref 0–1)
BUN BLDV-MCNC: 9 MG/DL (ref 6–23)
CALCIUM SERPL-MCNC: 9.5 MG/DL (ref 8.3–10.6)
CHLORIDE BLD-SCNC: 108 MMOL/L (ref 99–110)
CO2: 27 MMOL/L (ref 21–32)
CREAT SERPL-MCNC: 0.9 MG/DL (ref 0.9–1.3)
DIFFERENTIAL TYPE: ABNORMAL
EOSINOPHILS ABSOLUTE: 0.3 K/CU MM
EOSINOPHILS RELATIVE PERCENT: 4.7 % (ref 0–3)
GFR AFRICAN AMERICAN: >60 ML/MIN/1.73M2
GFR NON-AFRICAN AMERICAN: >60 ML/MIN/1.73M2
GLUCOSE BLD-MCNC: 96 MG/DL (ref 70–99)
HCT VFR BLD CALC: 34.1 % (ref 42–52)
HEMOGLOBIN: 11.4 GM/DL (ref 13.5–18)
IMMATURE NEUTROPHIL %: 0.3 % (ref 0–0.43)
LYMPHOCYTES ABSOLUTE: 1.1 K/CU MM
LYMPHOCYTES RELATIVE PERCENT: 15.6 % (ref 24–44)
MCH RBC QN AUTO: 31.3 PG (ref 27–31)
MCHC RBC AUTO-ENTMCNC: 33.4 % (ref 32–36)
MCV RBC AUTO: 93.7 FL (ref 78–100)
MONOCYTES ABSOLUTE: 0.8 K/CU MM
MONOCYTES RELATIVE PERCENT: 11 % (ref 0–4)
NUCLEATED RBC %: 0 %
PDW BLD-RTO: 14.4 % (ref 11.7–14.9)
PLATELET # BLD: 156 K/CU MM (ref 140–440)
PMV BLD AUTO: 9.5 FL (ref 7.5–11.1)
POTASSIUM SERPL-SCNC: 4 MMOL/L (ref 3.5–5.1)
RBC # BLD: 3.64 M/CU MM (ref 4.6–6.2)
SEGMENTED NEUTROPHILS ABSOLUTE COUNT: 4.7 K/CU MM
SEGMENTED NEUTROPHILS RELATIVE PERCENT: 67.7 % (ref 36–66)
SODIUM BLD-SCNC: 147 MMOL/L (ref 135–145)
TOTAL IMMATURE NEUTOROPHIL: 0.02 K/CU MM
TOTAL NUCLEATED RBC: 0 K/CU MM
TOTAL PROTEIN: 6.3 GM/DL (ref 6.4–8.2)
WBC # BLD: 7 K/CU MM (ref 4–10.5)

## 2021-08-02 PROCEDURE — 2580000003 HC RX 258: Performed by: NURSE PRACTITIONER

## 2021-08-02 PROCEDURE — 80053 COMPREHEN METABOLIC PANEL: CPT

## 2021-08-02 PROCEDURE — 36591 DRAW BLOOD OFF VENOUS DEVICE: CPT

## 2021-08-02 PROCEDURE — 6360000002 HC RX W HCPCS: Performed by: NURSE PRACTITIONER

## 2021-08-02 PROCEDURE — 85025 COMPLETE CBC W/AUTO DIFF WBC: CPT

## 2021-08-02 RX ORDER — SODIUM CHLORIDE 0.9 % (FLUSH) 0.9 %
10 SYRINGE (ML) INJECTION PRN
Status: DISCONTINUED | OUTPATIENT
Start: 2021-08-02 | End: 2021-08-03 | Stop reason: HOSPADM

## 2021-08-02 RX ORDER — SODIUM CHLORIDE 0.9 % (FLUSH) 0.9 %
10 SYRINGE (ML) INJECTION PRN
Status: CANCELLED | OUTPATIENT
Start: 2021-08-05

## 2021-08-02 RX ORDER — HEPARIN SODIUM (PORCINE) LOCK FLUSH IV SOLN 100 UNIT/ML 100 UNIT/ML
500 SOLUTION INTRAVENOUS PRN
Status: CANCELLED
Start: 2021-08-05

## 2021-08-02 RX ORDER — HEPARIN SODIUM (PORCINE) LOCK FLUSH IV SOLN 100 UNIT/ML 100 UNIT/ML
500 SOLUTION INTRAVENOUS PRN
Status: DISCONTINUED | OUTPATIENT
Start: 2021-08-02 | End: 2021-08-03 | Stop reason: HOSPADM

## 2021-08-02 RX ORDER — 0.9 % SODIUM CHLORIDE 0.9 %
250 INTRAVENOUS SOLUTION INTRAVENOUS ONCE
Status: CANCELLED | OUTPATIENT
Start: 2021-08-05 | End: 2021-08-05

## 2021-08-02 RX ORDER — SODIUM CHLORIDE 0.9 % (FLUSH) 0.9 %
10 SYRINGE (ML) INJECTION PRN
Status: CANCELLED
Start: 2021-08-05

## 2021-08-02 RX ADMIN — HEPARIN 500 UNITS: 100 SYRINGE at 14:52

## 2021-08-02 RX ADMIN — SODIUM CHLORIDE, PRESERVATIVE FREE 20 ML: 5 INJECTION INTRAVENOUS at 14:52

## 2021-08-02 NOTE — PROGRESS NOTES
Kathleen deaccessed. DSD applied. Pt tolerated well. Discharged instructions given to pt, pt voiced understanding. Pt discharged via ambulatory by self with girlfriend to exit.

## 2021-08-02 NOTE — PROGRESS NOTES
Pt taken to room 03 for blood from [facility]. Pt oriented to room, call light, bed/chair controls, TV, pt voiced understanding. Plan of care explained to pt, pt voiced understanding.

## 2021-08-02 NOTE — PROGRESS NOTES
Called pt's mother and advised pt's lab results were good and did not need product. Faxed results to referring provider via [employer] per this nurse.

## 2021-08-05 ENCOUNTER — HOSPITAL ENCOUNTER (OUTPATIENT)
Dept: INFUSION THERAPY | Age: 26
Setting detail: INFUSION SERIES
Discharge: HOME | End: 2021-08-05
Payer: COMMERCIAL

## 2021-08-05 DIAGNOSIS — C85.10 B-CELL LYMPHOMA, UNSPECIFIED B-CELL LYMPHOMA TYPE, UNSPECIFIED BODY REGION (HCC): Primary | ICD-10-CM

## 2021-08-05 RX ORDER — 0.9 % SODIUM CHLORIDE 0.9 %
250 INTRAVENOUS SOLUTION INTRAVENOUS ONCE
Status: CANCELLED | OUTPATIENT
Start: 2021-08-09 | End: 2021-08-09

## 2021-08-05 RX ORDER — SODIUM CHLORIDE 0.9 % (FLUSH) 0.9 %
10 SYRINGE (ML) INJECTION PRN
Status: CANCELLED
Start: 2021-08-09

## 2021-08-05 RX ORDER — HEPARIN SODIUM (PORCINE) LOCK FLUSH IV SOLN 100 UNIT/ML 100 UNIT/ML
500 SOLUTION INTRAVENOUS PRN
Status: DISCONTINUED | OUTPATIENT
Start: 2021-08-05 | End: 2021-08-08 | Stop reason: HOSPADM

## 2021-08-05 RX ORDER — HEPARIN SODIUM (PORCINE) LOCK FLUSH IV SOLN 100 UNIT/ML 100 UNIT/ML
500 SOLUTION INTRAVENOUS PRN
Status: CANCELLED
Start: 2021-08-09

## 2021-08-05 RX ORDER — SODIUM CHLORIDE 0.9 % (FLUSH) 0.9 %
10 SYRINGE (ML) INJECTION PRN
Status: CANCELLED | OUTPATIENT
Start: 2021-08-09

## 2021-08-05 RX ORDER — SODIUM CHLORIDE 0.9 % (FLUSH) 0.9 %
10 SYRINGE (ML) INJECTION PRN
Status: DISCONTINUED | OUTPATIENT
Start: 2021-08-05 | End: 2021-08-08 | Stop reason: HOSPADM

## 2021-08-12 ENCOUNTER — HOSPITAL ENCOUNTER (OUTPATIENT)
Dept: INFUSION THERAPY | Age: 26
Setting detail: INFUSION SERIES
End: 2021-08-12
Payer: COMMERCIAL

## 2021-08-18 DIAGNOSIS — F32.9 MAJOR DEPRESSIVE DISORDER WITH CURRENT ACTIVE EPISODE, UNSPECIFIED DEPRESSION EPISODE SEVERITY, UNSPECIFIED WHETHER RECURRENT: ICD-10-CM

## 2021-08-18 RX ORDER — ESCITALOPRAM OXALATE 10 MG/1
10 TABLET ORAL DAILY
Qty: 90 TABLET | Refills: 1 | OUTPATIENT
Start: 2021-08-18 | End: 2022-02-14

## 2021-08-20 RX ORDER — METFORMIN HYDROCHLORIDE 500 MG/1
TABLET, EXTENDED RELEASE ORAL
Qty: 60 TABLET | Refills: 0 | Status: SHIPPED | OUTPATIENT
Start: 2021-08-20 | End: 2021-09-16

## 2021-09-16 ENCOUNTER — OFFICE VISIT (OUTPATIENT)
Dept: FAMILY MEDICINE CLINIC | Age: 26
End: 2021-09-16
Payer: COMMERCIAL

## 2021-09-16 VITALS
SYSTOLIC BLOOD PRESSURE: 118 MMHG | OXYGEN SATURATION: 98 % | DIASTOLIC BLOOD PRESSURE: 68 MMHG | HEIGHT: 76 IN | BODY MASS INDEX: 23.25 KG/M2 | HEART RATE: 79 BPM | WEIGHT: 190.9 LBS

## 2021-09-16 DIAGNOSIS — R73.09 ELEVATED HEMOGLOBIN A1C: Primary | ICD-10-CM

## 2021-09-16 DIAGNOSIS — F32.9 MAJOR DEPRESSIVE DISORDER WITH CURRENT ACTIVE EPISODE, UNSPECIFIED DEPRESSION EPISODE SEVERITY, UNSPECIFIED WHETHER RECURRENT: ICD-10-CM

## 2021-09-16 PROCEDURE — 4004F PT TOBACCO SCREEN RCVD TLK: CPT | Performed by: PHYSICIAN ASSISTANT

## 2021-09-16 PROCEDURE — 99214 OFFICE O/P EST MOD 30 MIN: CPT | Performed by: PHYSICIAN ASSISTANT

## 2021-09-16 PROCEDURE — G8420 CALC BMI NORM PARAMETERS: HCPCS | Performed by: PHYSICIAN ASSISTANT

## 2021-09-16 PROCEDURE — G8427 DOCREV CUR MEDS BY ELIG CLIN: HCPCS | Performed by: PHYSICIAN ASSISTANT

## 2021-09-16 RX ORDER — METFORMIN HYDROCHLORIDE 500 MG/1
500 TABLET, EXTENDED RELEASE ORAL 2 TIMES DAILY
Qty: 60 TABLET | Refills: 2 | Status: CANCELLED | OUTPATIENT
Start: 2021-09-16

## 2021-09-16 RX ORDER — ESCITALOPRAM OXALATE 10 MG/1
10 TABLET ORAL DAILY
Qty: 90 TABLET | Refills: 1 | Status: SHIPPED | OUTPATIENT
Start: 2021-09-16 | End: 2022-06-20

## 2021-09-16 NOTE — PATIENT INSTRUCTIONS
Ever Beckman MD  [facility]  [address], [address] [place], [place]  [phone]

## 2021-10-01 ENCOUNTER — HOSPITAL ENCOUNTER (OUTPATIENT)
Dept: INFUSION THERAPY | Age: 26
Setting detail: INFUSION SERIES
Discharge: HOME | End: 2021-10-01
Payer: COMMERCIAL

## 2021-10-01 DIAGNOSIS — C83.70 BURKITT LYMPHOMA, UNSPECIFIED BODY REGION (HCC): Primary | ICD-10-CM

## 2021-10-01 PROCEDURE — 96523 IRRIG DRUG DELIVERY DEVICE: CPT

## 2021-10-01 PROCEDURE — 6360000002 HC RX W HCPCS: Performed by: NURSE PRACTITIONER

## 2021-10-01 PROCEDURE — 2580000003 HC RX 258: Performed by: NURSE PRACTITIONER

## 2021-10-01 RX ORDER — SODIUM CHLORIDE 0.9 % (FLUSH) 0.9 %
5-40 SYRINGE (ML) INJECTION PRN
Status: CANCELLED | OUTPATIENT
Start: 2021-10-01

## 2021-10-01 RX ORDER — HEPARIN SODIUM (PORCINE) LOCK FLUSH IV SOLN 100 UNIT/ML 100 UNIT/ML
500 SOLUTION INTRAVENOUS PRN
Status: CANCELLED | OUTPATIENT
Start: 2021-10-01

## 2021-10-01 RX ORDER — HEPARIN SODIUM (PORCINE) LOCK FLUSH IV SOLN 100 UNIT/ML 100 UNIT/ML
500 SOLUTION INTRAVENOUS PRN
Status: DISCONTINUED | OUTPATIENT
Start: 2021-10-01 | End: 2021-10-02 | Stop reason: HOSPADM

## 2021-10-01 RX ORDER — SODIUM CHLORIDE 0.9 % (FLUSH) 0.9 %
5-40 SYRINGE (ML) INJECTION PRN
Status: DISCONTINUED | OUTPATIENT
Start: 2021-10-01 | End: 2021-10-02 | Stop reason: HOSPADM

## 2021-10-01 RX ADMIN — HEPARIN 500 UNITS: 100 SYRINGE at 12:47

## 2021-10-01 RX ADMIN — SODIUM CHLORIDE, PRESERVATIVE FREE 20 ML: 5 INJECTION INTRAVENOUS at 12:47

## 2021-10-01 NOTE — PROGRESS NOTES
Tolerated appt well. Reviewed discharge instruction, voiced understanding. Copies of AVS given. Pt discharged home. Pt to exit via steady gait.     Orders Placed This Encounter   Medications    sodium chloride flush 0.9 % injection 5-40 mL    heparin flush 100 UNIT/ML injection 500 Units

## 2021-10-04 DIAGNOSIS — R73.09 ELEVATED HEMOGLOBIN A1C: ICD-10-CM

## 2021-10-04 DIAGNOSIS — E10.9 TYPE 1 DIABETES MELLITUS WITHOUT COMPLICATION (HCC): Primary | ICD-10-CM

## 2021-10-05 ENCOUNTER — TELEPHONE (OUTPATIENT)
Dept: FAMILY MEDICINE CLINIC | Age: 26
End: 2021-10-05

## 2021-10-14 ENCOUNTER — TELEPHONE (OUTPATIENT)
Dept: INFUSION THERAPY | Age: 26
End: 2021-10-14

## 2021-10-15 ENCOUNTER — TELEPHONE (OUTPATIENT)
Dept: FAMILY MEDICINE CLINIC | Age: 26
End: 2021-10-15

## 2021-10-15 NOTE — TELEPHONE ENCOUNTER
----- Message from Dov Baires PA-C sent at 10/4/2021  5:11 PM EDT -----  Regarding: Endocrinology Referral  Please call the patient and let him know that the endocrinologist I referred him to does not take his insurance, so we will need to find a different endocrinologist.  Let's try Dr. Sagar James in [place]. If the patient has not heard from their office by next Wednesday, 10/13/2021, he needs to call Dr. Sharyle Stapler office himself to schedule an appointment and make sure they take his insurance. If they do not take his insurance, he will need to call his insurance and ask them what provider he should see and let us know what they say.     Tiny Olivarez MD  [facility]   [address] [facility], [address] [place] [address], [address]   [phone]

## 2021-10-20 ENCOUNTER — HOSPITAL ENCOUNTER (OUTPATIENT)
Dept: INFUSION THERAPY | Age: 26
Setting detail: INFUSION SERIES
Discharge: HOME | End: 2021-10-20
Payer: COMMERCIAL

## 2021-10-20 DIAGNOSIS — C83.70 BURKITT LYMPHOMA, UNSPECIFIED BODY REGION (HCC): Primary | ICD-10-CM

## 2021-10-20 PROCEDURE — 96523 IRRIG DRUG DELIVERY DEVICE: CPT

## 2021-10-20 PROCEDURE — 2580000003 HC RX 258: Performed by: NURSE PRACTITIONER

## 2021-10-20 PROCEDURE — 6360000002 HC RX W HCPCS: Performed by: NURSE PRACTITIONER

## 2021-10-20 RX ORDER — SODIUM CHLORIDE 0.9 % (FLUSH) 0.9 %
5-40 SYRINGE (ML) INJECTION PRN
OUTPATIENT
Start: 2021-12-22

## 2021-10-20 RX ORDER — HEPARIN SODIUM (PORCINE) LOCK FLUSH IV SOLN 100 UNIT/ML 100 UNIT/ML
500 SOLUTION INTRAVENOUS PRN
Status: DISCONTINUED | OUTPATIENT
Start: 2021-10-20 | End: 2021-10-21 | Stop reason: HOSPADM

## 2021-10-20 RX ORDER — SODIUM CHLORIDE 0.9 % (FLUSH) 0.9 %
5-40 SYRINGE (ML) INJECTION PRN
Status: DISCONTINUED | OUTPATIENT
Start: 2021-10-20 | End: 2021-10-21 | Stop reason: HOSPADM

## 2021-10-20 RX ORDER — HEPARIN SODIUM (PORCINE) LOCK FLUSH IV SOLN 100 UNIT/ML 100 UNIT/ML
500 SOLUTION INTRAVENOUS PRN
OUTPATIENT
Start: 2021-12-22

## 2021-10-20 RX ADMIN — SODIUM CHLORIDE, PRESERVATIVE FREE 30 ML: 5 INJECTION INTRAVENOUS at 10:34

## 2021-10-20 RX ADMIN — HEPARIN 500 UNITS: 100 SYRINGE at 10:34

## 2021-10-20 NOTE — PROGRESS NOTES
Pt here for de-clot of mediport d/t last visit blood return was very sluggish. Port accessed per protocol without difficulty. Blood returned readily. No need for cathflo at this time. Pt notified. Will call and return as per [facility] for port flushes.

## 2022-03-30 ENCOUNTER — HOSPITAL ENCOUNTER (EMERGENCY)
Age: 27
Discharge: HOME | End: 2022-03-30
Payer: COMMERCIAL

## 2022-03-30 VITALS
SYSTOLIC BLOOD PRESSURE: 144 MMHG | TEMPERATURE: 97.7 F | WEIGHT: 190 LBS | OXYGEN SATURATION: 96 % | HEIGHT: 76 IN | RESPIRATION RATE: 20 BRPM | HEART RATE: 96 BPM | BODY MASS INDEX: 23.14 KG/M2 | DIASTOLIC BLOOD PRESSURE: 106 MMHG

## 2022-03-30 DIAGNOSIS — K02.9 PAIN DUE TO DENTAL CARIES: Primary | ICD-10-CM

## 2022-03-30 PROCEDURE — 99283 EMERGENCY DEPT VISIT LOW MDM: CPT

## 2022-03-30 RX ORDER — AMOXICILLIN AND CLAVULANATE POTASSIUM 875; 125 MG/1; MG/1
1 TABLET, FILM COATED ORAL 2 TIMES DAILY
Qty: 14 TABLET | Refills: 0 | Status: SHIPPED | OUTPATIENT
Start: 2022-03-30 | End: 2022-04-06

## 2022-03-30 RX ORDER — ACETAMINOPHEN AND CODEINE PHOSPHATE 300; 30 MG/1; MG/1
1 TABLET ORAL EVERY 8 HOURS PRN
Qty: 9 TABLET | Refills: 0 | Status: SHIPPED | OUTPATIENT
Start: 2022-03-30 | End: 2022-04-02

## 2022-03-30 RX ORDER — CHLORHEXIDINE GLUCONATE 0.12 MG/ML
15 RINSE ORAL 2 TIMES DAILY
Qty: 420 ML | Refills: 0 | Status: SHIPPED | OUTPATIENT
Start: 2022-03-30 | End: 2022-04-13

## 2022-03-30 ASSESSMENT — PAIN DESCRIPTION - FREQUENCY: FREQUENCY: CONTINUOUS

## 2022-03-30 ASSESSMENT — PAIN DESCRIPTION - ORIENTATION: ORIENTATION: RIGHT

## 2022-03-30 ASSESSMENT — PAIN DESCRIPTION - PAIN TYPE: TYPE: ACUTE PAIN

## 2022-03-30 ASSESSMENT — PAIN DESCRIPTION - LOCATION: LOCATION: JAW

## 2022-03-30 ASSESSMENT — PAIN DESCRIPTION - DESCRIPTORS: DESCRIPTORS: STABBING;THROBBING

## 2022-03-30 ASSESSMENT — PAIN SCALES - GENERAL: PAINLEVEL_OUTOF10: 10

## 2022-03-30 NOTE — ED NOTES
Pt resting in bed with ice pack on face. Pt states he has been having dental pain for the last month that has been tolerable until last PM when it then became excruciating. 9/10 to right side of face. Pt states that he had a dental appt scheduled but mom tested + for COVID so he had to reschedule his dental appt. Pt denies any other complaints. Resp even & non-labored. Will cont to monitor.       Ragini Costa RN  03/30/22 4054

## 2022-03-30 NOTE — ED NOTES
Pt discharged home. Instructions given, pt denied any questions or concerns at this time.      Ida Bose RN  03/30/22 1021

## 2022-03-30 NOTE — ED PROVIDER NOTES
eMERGENCY dEPARTMENT eNCOUnter         [address] [facility]    PCP: Marivel Diaz DO    CHIEF COMPLAINT    Chief Complaint   Patient presents with    Dental Pain     complaining of right lower dental pain for 1 month. HPI    Kristopher Huffman is a 27 y.o. male who presents with right lower dental pain. Onset is acute on chronic but worse over the last month. Context is patient states he was last seen by family dentist for similar complaints approximately a year ago, they recommended right lower posterior molar extraction however he did not have insurance at that time. He states that he began having return of similar right lower dental pain rating along the right lower jaw. Has been taking over-the-counter tylenol/ibuprofen without relief of symptoms. Dates he tried scheduling appointment with his family dentist last week however his mother tested positive for COVID-19 so family dentist he had at his appointment but patient himself was asymptomatic for any complaints. Pain at this time is a throbbing sharp aching pain, 10/10, worse with eating drinking. No fever or chills. No tongue or lip swelling, difficulty swallowing or breathing. No chest pain or shortness of breath.     REVIEW OF SYSTEMS    General: Denies Fever, Denies Chills, Denies syncope  ENT:  No tongue or lip swelling, No difficulty swallowing,   Cardiac: Denies Chest Pain, palpitations  Respiratory: Denies cough and hemoptysis  GI: Denies vomiting or diarrhea    All other review of systems are negative  See HPI and nursing notes for additional information     PAST MEDICAL & SURGICAL HISTORY    Past Medical History:   Diagnosis Date    Asthma     Burkitt lymphoma ([address] 3/25/2021    Burkitt's lymphoma ([address] 6/4/2021    Diabetes mellitus ([address]     High grade B-cell lymphoma ([address]     Unspecified B-cell lymphoma, unspecified site ([address] 3/29/2021     Past Surgical History:   Procedure Laterality Date    PORT SURGERY N/A 2/24/2021    PORT INSERTION performed by Erica Ryan MD at [facility]    Current Outpatient Rx   Medication Sig Dispense Refill    amoxicillin-clavulanate (AUGMENTIN) 875-125 MG per tablet Take 1 tablet by mouth 2 times daily for 7 days 14 tablet 0    chlorhexidine (PERIDEX) 0.12 % solution Take 15 mLs by mouth 2 times daily for 14 days 420 mL 0    acetaminophen-codeine (TYLENOL/CODEINE #3) 300-30 MG per tablet Take 1 tablet by mouth every 8 hours as needed for Pain for up to 3 days.  9 tablet 0    escitalopram (LEXAPRO) 10 MG tablet Take 1 tablet by mouth daily 90 tablet 1    metFORMIN (GLUCOPHAGE) 500 MG tablet Take 1 tablet by mouth 2 times daily (with meals) 60 tablet 2    pantoprazole (PROTONIX) 40 MG tablet Take 40 mg by mouth daily      senna (SENOKOT) 8.6 MG tablet Take 1 tablet by mouth 2 times daily      fluconazole (DIFLUCAN) 200 MG tablet Take 200 mg by mouth daily      prochlorperazine (COMPAZINE) 10 MG tablet Take 10 mg by mouth every 6 hours as needed      valACYclovir (VALTREX) 500 MG tablet Take 500 mg by mouth daily      ondansetron (ZOFRAN) 4 MG tablet Take 4 mg by mouth every 8 hours as needed for Nausea or Vomiting      insulin aspart (NOVOLOG FLEXPEN) 100 UNIT/ML injection pen Sliding scale ( pt unsure units) 5 pen 1    naproxen (NAPROSYN) 500 MG tablet Take 1 tablet by mouth 2 times daily 60 tablet 0    acetaminophen (APAP EXTRA STRENGTH) 500 MG tablet Take 1 tablet by mouth every 6 hours as needed for Pain 30 tablet 0       ALLERGIES    No Known Allergies    SOCIAL & FAMILY HISTORY    Social History     Socioeconomic History    Marital status: Single     Spouse name: None    Number of children: None    Years of education: None    Highest education level: None   Occupational History    None   Tobacco Use    Smoking status: Current Every Day Smoker     Packs/day: 0.50     Years: 5.00     Pack years: 2.50 Types: Cigarettes     Start date: 2016    Smokeless tobacco: Never Used   Vaping Use    Vaping Use: Never used   Substance and Sexual Activity    Alcohol use: No    Drug use: No    Sexual activity: Yes   Other Topics Concern    None   Social History Narrative    None     Social Determinants of Health     Financial Resource Strain: Low Risk     Difficulty of Paying Living Expenses: Not hard at all   Food Insecurity: No Food Insecurity    Worried About Running Out of Food in the Last Year: Never true    Shi of Food in the Last Year: Never true   Transportation Needs: No Transportation Needs    Lack of Transportation (Medical): No    Lack of Transportation (Non-Medical):  No   Physical Activity:     Days of Exercise per Week: Not on file    Minutes of Exercise per Session: Not on file   Stress:     Feeling of Stress : Not on file   Social Connections:     Frequency of Communication with Friends and Family: Not on file    Frequency of Social Gatherings with Friends and Family: Not on file    Attends [employer]ism Services: Not on file    Active Member of [address] [place] [address] or Organizations: Not on file    Attends Club or Organization Meetings: Not on file    Marital Status: Not on file   Intimate Partner Violence:     Fear of Current or Ex-Partner: Not on file    Emotionally Abused: Not on file    Physically Abused: Not on file    Sexually Abused: Not on file   Housing Stability:     Unable to Pay for Housing in the Last Year: Not on file    Number of [place] in the Last Year: Not on file    Unstable Housing in the Last Year: Not on file     Family History   Problem Relation Age of Onset    Diabetes type 2  Father     Cancer Maternal Aunt     Diabetes type 2  Maternal Grandmother        PHYSICAL EXAM    VITAL SIGNS: BP (!) 140/117   Pulse 96   Temp 97.7 °F (36.5 °C) (Oral)   Resp 20   SpO2 97%    Constitutional:  Well developed, well nourished, no acute distress   HENT:  Atraumatic, Normocephalic. EOMIs. Nasal bones midline. No facial edema or redness. Moist mucus membranes  Neck/Lymphatics: supple, no JVD, no swollen nodes   Musculoskeletal:  No edema, no deformities  Integument:  Skin warm and dry, no petechiae   Neurologic:  Alert & oriented, no slurred speech  Psych: Pleasant affect, no hallucinations    Dental:   Overall, fair to poor dentition of the upper and lower teeth. Several molars with some evidence of decay as well as his gingival disease. In the right lower mouth, #31 posterior molar with significant erosion of the central aspect of tooth surface with decay of the remaining to surface. Surrounding gingival swelling without palpable mass or abscess. Remaining   Gingival buccal interface without obvious mass or discoloration, or fluctuance to palpation. No sublingual swelling or masses   No submandibular swelling. Oropharynx:    Posterior pharynx without swelling, tonsillar hypertrophy. No sublingual swelling. ED COURSE & MEDICAL DECISION MAKING       Vital signs and nursing notes reviewed during ED course. I have independently evaluated this patient . Supervising MD - Dr. Jackie Lovett - present in the Emergency Department, available for consultation, throughout entirety of  patient care. All pertinent Lab data and radiographic results reviewed with patient at bedside. The patient and/or the family were informed of the results of any tests/labs/imaging, the treatment plan, and time was allotted to answer questions. Differential Diagnosis: dental pain, dental abscess, caries, peridontal disease, necrotic teeth, Ludwigs angina, bacteremia/spesis        Clinical  IMPRESSION    1. Pain due to dental caries      Patient presents with right lower dental pain, acute on chronic. On exam, pleasant well-appearing 72-year-old male, no acute respiratory distress. Tolerating oral secretions. No facial or tongue swelling.   Overall, poor to fair dentition of the upper and lower teeth. In the right lower mouth, #31 molar with significant erosion of the central aspect of the tooth surface with decay of the remaining surface. Mild gingival swelling, but there are no palpable dental abscesses that require I&D at this time. No signs or symptoms of systemic involvement as presenting symptoms appear localized to the dentition. I estimate there is low risk for low risk for deep space infection  (ie Rahat's angina or retropharyngeal abscess/peritonsillar abscess) bacterial meningitis, airway compromise, bacteremia/sepsis thus I consider the discharge disposition reasonable. We have discussed the symptoms which are most concerning that necessitate immediate return. Patient is discharged in stable condition with prescription for Tylenol #3 for pain control & Augmentin. I also provided Peridex oral solution. Patient is provided list today of local dental clinics and offices and encouraged to establish care/followup in 1-2 days. Patient and/or family understand that Tylenol ## is an opioid and is addictive in nature. Patient understands to use the least effective dose for the least amount of time possible. I had a discussion with patient regarding prescribed medications and the benefits as well as risks/possible side effects. I cautioned patient against using this medication while drinking alcohol, driving, and operating machinery. Patient understands and agrees. Return to emergency Department warning signs discussed in detail with patient who understands and agrees, including but not limited to difficulty swallowing, increased jaw swelling, fever, increased pain, or any new symptoms. Comment: Please note this report has been produced using speech recognition software and may contain errors related to that system including errors in grammar, punctuation, and spelling, as well as words and phrases that may be inappropriate.  If there are any questions or concerns please feel free to contact the dictating provider for clarification.         Catarino Mckenzie PA-C  03/30/22 1013

## 2022-06-06 ENCOUNTER — OFFICE VISIT (OUTPATIENT)
Dept: SURGERY | Age: 27
End: 2022-06-06
Payer: COMMERCIAL

## 2022-06-06 VITALS
BODY MASS INDEX: 27.76 KG/M2 | OXYGEN SATURATION: 98 % | HEIGHT: 76 IN | DIASTOLIC BLOOD PRESSURE: 86 MMHG | WEIGHT: 228 LBS | SYSTOLIC BLOOD PRESSURE: 120 MMHG | HEART RATE: 83 BPM

## 2022-06-06 DIAGNOSIS — C83.70 BURKITT LYMPHOMA, UNSPECIFIED BODY REGION (HCC): Primary | ICD-10-CM

## 2022-06-06 PROCEDURE — 99214 OFFICE O/P EST MOD 30 MIN: CPT | Performed by: SURGERY

## 2022-06-06 PROCEDURE — G8419 CALC BMI OUT NRM PARAM NOF/U: HCPCS | Performed by: SURGERY

## 2022-06-06 PROCEDURE — 4004F PT TOBACCO SCREEN RCVD TLK: CPT | Performed by: SURGERY

## 2022-06-06 PROCEDURE — G8427 DOCREV CUR MEDS BY ELIG CLIN: HCPCS | Performed by: SURGERY

## 2022-06-07 ENCOUNTER — TELEPHONE (OUTPATIENT)
Dept: SURGERY | Age: 27
End: 2022-06-07

## 2022-06-07 NOTE — TELEPHONE ENCOUNTER
(VM FULL) LEFT MESSAGE FOR [address] (MEDIPORT REMOVAL) SCHEDULED @ [facility].  NOTIFIED OF DATES, TIMES AND LOCATION    PHONE ASSESSMENT   SURGERY - 6/14/22 @ 1100  P/O - 6/20/22 @ 900    NPO AFTER MIDNIGHT  HOLD BLOOD THINNERS

## 2022-06-13 ENCOUNTER — ANESTHESIA EVENT (OUTPATIENT)
Dept: OPERATING ROOM | Age: 27
End: 2022-06-13
Payer: COMMERCIAL

## 2022-06-13 ASSESSMENT — LIFESTYLE VARIABLES: SMOKING_STATUS: 1

## 2022-06-13 NOTE — ANESTHESIA PRE PROCEDURE
Department of Anesthesiology  Preprocedure Note       Name:  Barbara yTler   Age:  27 y. o.  :  1995                                          MRN:  [id number]         Date:  2022      Surgeon: Chauncey Shfafer):  Angus Adams MD    Procedure: Procedure(s):  PORT REMOVAL    Medications prior to admission:   Prior to Admission medications    Medication Sig Start Date End Date Taking?  Authorizing Provider   escitalopram (LEXAPRO) 10 MG tablet Take 1 tablet by mouth daily 21  Sumit Mart PA-C   metFORMIN (GLUCOPHAGE) 500 MG tablet Take 1 tablet by mouth 2 times daily (with meals) 21  Sumit Mart PA-C   pantoprazole (PROTONIX) 40 MG tablet Take 40 mg by mouth daily    Historical Provider, MD   fluconazole (DIFLUCAN) 200 MG tablet Take 200 mg by mouth daily    Historical Provider, MD   prochlorperazine (COMPAZINE) 10 MG tablet Take 10 mg by mouth every 6 hours as needed    Historical Provider, MD   ondansetron (ZOFRAN) 4 MG tablet Take 4 mg by mouth every 8 hours as needed for Nausea or Vomiting    Historical Provider, MD   insulin aspart (NOVOLOG FLEXPEN) 100 UNIT/ML injection pen Sliding scale ( pt unsure units) 21   Sumit Mart PA-C   naproxen (NAPROSYN) 500 MG tablet Take 1 tablet by mouth 2 times daily 19   Larry Larios PA-C   acetaminophen (APAP EXTRA STRENGTH) 500 MG tablet Take 1 tablet by mouth every 6 hours as needed for Pain 18   ESTER Cheema       Current medications:    Current Outpatient Medications   Medication Sig Dispense Refill    escitalopram (LEXAPRO) 10 MG tablet Take 1 tablet by mouth daily 90 tablet 1    metFORMIN (GLUCOPHAGE) 500 MG tablet Take 1 tablet by mouth 2 times daily (with meals) 60 tablet 2    pantoprazole (PROTONIX) 40 MG tablet Take 40 mg by mouth daily      fluconazole (DIFLUCAN) 200 MG tablet Take 200 mg by mouth daily      prochlorperazine (COMPAZINE) 10 MG tablet Take 10 mg by mouth every 6 hours as needed  ondansetron (ZOFRAN) 4 MG tablet Take 4 mg by mouth every 8 hours as needed for Nausea or Vomiting      insulin aspart (NOVOLOG FLEXPEN) 100 UNIT/ML injection pen Sliding scale ( pt unsure units) 5 pen 1    naproxen (NAPROSYN) 500 MG tablet Take 1 tablet by mouth 2 times daily 60 tablet 0    acetaminophen (APAP EXTRA STRENGTH) 500 MG tablet Take 1 tablet by mouth every 6 hours as needed for Pain 30 tablet 0     No current facility-administered medications for this visit. 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    Unspecified B-cell lymphoma, unspecified site ([facility] C85.10    Type 1 diabetes mellitus without complication ([facility] X94.4    Major depressive disorder with current active episode F32.9    Burkitt's lymphoma ([facility] C83.70       Past Medical History:        Diagnosis Date    Asthma     Burkitt lymphoma ([facility] 3/25/2021    Burkitt's lymphoma ([facility] 6/4/2021    Diabetes mellitus ([facility]     High grade B-cell lymphoma ([facility]     Unspecified B-cell lymphoma, unspecified site ([facility] 3/29/2021       Past Surgical History:        Procedure Laterality Date    PORT SURGERY N/A 2/24/2021    PORT INSERTION performed by Gavi Dotson MD at [address] [place] [address] History:    Social History     Tobacco Use    Smoking status: Current Every Day Smoker     Packs/day: 0.75     Years: 5.00     Pack years: 3.75     Types: Cigarettes     Start date: 2016    Smokeless tobacco: Never Used   Substance Use Topics    Alcohol use: No                                Ready to quit: Not Answered  Counseling given: Not Answered      Vital Signs (Current): There were no vitals filed for this visit.                                            BP Readings from Last 3 Encounters:   06/06/22 120/86   03/30/22 (!) 144/106   09/16/21 118/68       NPO Status: BMI:   Wt Readings from Last 3 Encounters:   06/06/22 228 lb (103.4 kg)   03/30/22 190 lb (86.2 kg)   09/16/21 190 lb 14.4 oz (86.6 kg)     There is no height or weight on file to calculate BMI.    CBC:   Lab Results   Component Value Date    WBC 7.0 08/02/2021    RBC 3.64 08/02/2021    HGB 11.4 08/02/2021    HCT 34.1 08/02/2021    MCV 93.7 08/02/2021    RDW 14.4 08/02/2021     08/02/2021       CMP:   Lab Results   Component Value Date     08/02/2021    K 4.0 08/02/2021     08/02/2021    CO2 27 08/02/2021    BUN 9 08/02/2021    CREATININE 0.9 08/02/2021    GFRAA >60 08/02/2021    LABGLOM >60 08/02/2021    GLUCOSE 96 08/02/2021    PROT 6.3 08/02/2021    CALCIUM 9.5 08/02/2021    BILITOT 0.2 08/02/2021    ALKPHOS 78 08/02/2021    AST 15 08/02/2021    ALT 11 08/02/2021       POC Tests:   No results for input(s): POCGLU, POCNA, POCK, POCCL, POCBUN, POCHEMO, POCHCT in the last 72 hours.     Coags:   Lab Results   Component Value Date    PROTIME 14.8 02/18/2021    INR 1.22 02/18/2021       HCG (If Applicable): No results found for: PREGTESTUR, PREGSERUM, HCG, HCGQUANT     ABGs: No results found for: PHART, PO2ART, AKE5WMS, MKH4PPP, BEART, Q3RQMBIV     Type & Screen (If Applicable):  No results found for: LABABO, LABRH    Drug/Infectious Status (If Applicable):  Lab Results   Component Value Date    HEPCAB NON REACTIVE 02/23/2021       COVID-19 Screening (If Applicable):   Lab Results   Component Value Date    COVID19 NOT DETECTED 02/18/2021         Anesthesia Evaluation  Patient summary reviewed no history of anesthetic complications:   Airway: Mallampati: I  TM distance: >3 FB   Neck ROM: full  Mouth opening: > = 3 FB   Dental: normal exam         Pulmonary: breath sounds clear to auscultation  (+) asthma: current smoker                           Cardiovascular:  Exercise tolerance: good (>4 METS),         ECG reviewed  Rhythm: regular  Rate: normal  Echocardiogram reviewed               ROS comment: Sinus tachycardia   Nonspecific T wave abnormality   Abnormal ECG   When compared with ECG of 05-NOV-2020 16:13,   ST no longer elevated in Inferior leads   Nonspecific T wave abnormality now evident in Inferior leads   Nonspecific T wave abnormality, worse in Lateral leads   Confirmed by Beba Plater ([id number]) on 2/21/2021 5:09:25 PM      Neuro/Psych:   Negative Neuro/Psych ROS              GI/Hepatic/Renal: Neg GI/Hepatic/Renal ROS            Endo/Other:    (+) DiabetesType II DM, , malignancy/cancer (stage 3b Burkitt's lymphoma). Pt had no PAT visit       Abdominal:             Vascular: Other Findings:             Anesthesia Plan      MAC     ASA 3     (Chart review only 6/13/22)  Induction: intravenous.                             BRAYAN Sun - CRNA   6/13/2022

## 2022-06-13 NOTE — PROGRESS NOTES
Spoke with patient and he will arrive at [phone] at [facility] on 6/14/2022 for his procedure at 1215.

## 2022-06-13 NOTE — PROGRESS NOTES
Surgery @ [facility] on 6/14/22 @ [phone], arrival 21                1. Do not eat or drink anything after midnight - unless instructed by your doctor prior to surgery. This includes                   no water, chewing gum or mints. 2. Follow your directions as prescribed by the doctor for your procedure and medications. Take Metformin & Pantoprazole morning of surgery                   With sips of water. 3. Check with your Doctor regarding stopping vitamins, supplements, blood thinners (Plavix, Coumadin, Lovenox, Effient, Pradaxa, Xarelto, Fragmin or                   other blood thinners) and follow their instructions. 4. Do not smoke, and do not drink any alcoholic beverages 24 hours prior to surgery. This includes NA Beer. No street drugs 7 days prior to surgery. 5. You may brush your teeth and gargle the morning of surgery. DO NOT SWALLOW WATER   6. You MUST make arrangements for a responsible adult to take you home after your surgery and be able to check on you every couple                   hours for the day. You will not be allowed to leave alone or drive yourself home. It is strongly suggested someone stay with you the first 24                   hrs. Your surgery will be cancelled if you do not have a ride home. 7. Please wear simple, loose fitting clothing to the hospital.  [place] not bring valuables (money, credit cards, checkbooks, etc.) Do not wear any                   makeup (including no eye makeup) or nail polish on your fingers or toes. 8. DO NOT wear any jewelry or piercings on day of surgery. All body piercing jewelry must be removed. 9. If you have dentures, they will be removed before going to the OR; we will provide you a container. If you wear contact lenses or glasses,                  they will be removed; please bring a case for them. 10. If you  have a Living Will and Durable Power of  for Healthcare, please bring in a copy. 11. Please bring picture ID,  insurance card, paperwork from the doctors office    (H & P, Consent, & card for implantable devices). 12. Take a shower the morning of your procedure with Hibiclens or an anti-bacterial soap. Do not apply any make-up, deodorant, lotion, oil or powder. 13.  Enter thru the main entrance wearing a mask on the day of surgery.

## 2022-06-14 ENCOUNTER — ANESTHESIA (OUTPATIENT)
Dept: OPERATING ROOM | Age: 27
End: 2022-06-14
Payer: COMMERCIAL

## 2022-06-14 ENCOUNTER — HOSPITAL ENCOUNTER (OUTPATIENT)
Age: 27
Setting detail: OUTPATIENT SURGERY
Discharge: HOME | End: 2022-06-14
Attending: SURGERY | Admitting: SURGERY
Payer: COMMERCIAL

## 2022-06-14 VITALS
TEMPERATURE: 97 F | HEART RATE: 65 BPM | HEIGHT: 76 IN | RESPIRATION RATE: 18 BRPM | SYSTOLIC BLOOD PRESSURE: 114 MMHG | DIASTOLIC BLOOD PRESSURE: 73 MMHG | OXYGEN SATURATION: 96 % | WEIGHT: 220 LBS | BODY MASS INDEX: 26.79 KG/M2

## 2022-06-14 DIAGNOSIS — C83.70 BURKITT LYMPHOMA, UNSPECIFIED BODY REGION (HCC): ICD-10-CM

## 2022-06-14 LAB — GLUCOSE BLD-MCNC: 258 MG/DL (ref 70–99)

## 2022-06-14 PROCEDURE — 2709999900 HC NON-CHARGEABLE SUPPLY: Performed by: SURGERY

## 2022-06-14 PROCEDURE — 3700000000 HC ANESTHESIA ATTENDED CARE: Performed by: SURGERY

## 2022-06-14 PROCEDURE — 7100000010 HC PHASE II RECOVERY - FIRST 15 MIN: Performed by: SURGERY

## 2022-06-14 PROCEDURE — 6360000002 HC RX W HCPCS

## 2022-06-14 PROCEDURE — 2580000003 HC RX 258: Performed by: ANESTHESIOLOGY

## 2022-06-14 PROCEDURE — 88300 SURGICAL PATH GROSS: CPT

## 2022-06-14 PROCEDURE — 82962 GLUCOSE BLOOD TEST: CPT

## 2022-06-14 PROCEDURE — 2500000003 HC RX 250 WO HCPCS

## 2022-06-14 PROCEDURE — 3600000012 HC SURGERY LEVEL 2 ADDTL 15MIN: Performed by: SURGERY

## 2022-06-14 PROCEDURE — 2500000003 HC RX 250 WO HCPCS: Performed by: SURGERY

## 2022-06-14 PROCEDURE — 3700000001 HC ADD 15 MINUTES (ANESTHESIA): Performed by: SURGERY

## 2022-06-14 PROCEDURE — 36589 REMOVAL TUNNELED CV CATH: CPT | Performed by: SURGERY

## 2022-06-14 PROCEDURE — 3600000002 HC SURGERY LEVEL 2 BASE: Performed by: SURGERY

## 2022-06-14 PROCEDURE — 7100000011 HC PHASE II RECOVERY - ADDTL 15 MIN: Performed by: SURGERY

## 2022-06-14 RX ORDER — PROPOFOL 10 MG/ML
INJECTION, EMULSION INTRAVENOUS PRN
Status: DISCONTINUED | OUTPATIENT
Start: 2022-06-14 | End: 2022-06-14 | Stop reason: SDUPTHER

## 2022-06-14 RX ORDER — FENTANYL CITRATE 50 UG/ML
INJECTION, SOLUTION INTRAMUSCULAR; INTRAVENOUS PRN
Status: DISCONTINUED | OUTPATIENT
Start: 2022-06-14 | End: 2022-06-14 | Stop reason: SDUPTHER

## 2022-06-14 RX ORDER — LIDOCAINE HYDROCHLORIDE 20 MG/ML
INJECTION, SOLUTION EPIDURAL; INFILTRATION; INTRACAUDAL; PERINEURAL PRN
Status: DISCONTINUED | OUTPATIENT
Start: 2022-06-14 | End: 2022-06-14 | Stop reason: SDUPTHER

## 2022-06-14 RX ORDER — SODIUM CHLORIDE, SODIUM LACTATE, POTASSIUM CHLORIDE, CALCIUM CHLORIDE 600; 310; 30; 20 MG/100ML; MG/100ML; MG/100ML; MG/100ML
INJECTION, SOLUTION INTRAVENOUS CONTINUOUS
Status: DISCONTINUED | OUTPATIENT
Start: 2022-06-14 | End: 2022-06-14 | Stop reason: HOSPADM

## 2022-06-14 RX ORDER — LIDOCAINE HYDROCHLORIDE 10 MG/ML
INJECTION, SOLUTION INFILTRATION; PERINEURAL
Status: COMPLETED | OUTPATIENT
Start: 2022-06-14 | End: 2022-06-14

## 2022-06-14 RX ORDER — PROPOFOL 10 MG/ML
INJECTION, EMULSION INTRAVENOUS CONTINUOUS PRN
Status: DISCONTINUED | OUTPATIENT
Start: 2022-06-14 | End: 2022-06-14 | Stop reason: SDUPTHER

## 2022-06-14 RX ORDER — CEFAZOLIN SODIUM 1 G/3ML
INJECTION, POWDER, FOR SOLUTION INTRAMUSCULAR; INTRAVENOUS PRN
Status: DISCONTINUED | OUTPATIENT
Start: 2022-06-14 | End: 2022-06-14 | Stop reason: SDUPTHER

## 2022-06-14 RX ADMIN — FENTANYL CITRATE 50 MCG: 50 INJECTION, SOLUTION INTRAMUSCULAR; INTRAVENOUS at 14:16

## 2022-06-14 RX ADMIN — SODIUM CHLORIDE, POTASSIUM CHLORIDE, SODIUM LACTATE AND CALCIUM CHLORIDE: 600; 310; 30; 20 INJECTION, SOLUTION INTRAVENOUS at 10:44

## 2022-06-14 RX ADMIN — PROPOFOL 75 MCG/KG/MIN: 10 INJECTION, EMULSION INTRAVENOUS at 14:03

## 2022-06-14 RX ADMIN — FENTANYL CITRATE 50 MCG: 50 INJECTION, SOLUTION INTRAMUSCULAR; INTRAVENOUS at 13:57

## 2022-06-14 RX ADMIN — PROPOFOL 70 MG: 10 INJECTION, EMULSION INTRAVENOUS at 14:03

## 2022-06-14 RX ADMIN — CEFAZOLIN 1000 MG: 1 INJECTION, POWDER, FOR SOLUTION INTRAMUSCULAR; INTRAVENOUS; PARENTERAL at 14:07

## 2022-06-14 RX ADMIN — LIDOCAINE HYDROCHLORIDE 100 MG: 20 INJECTION, SOLUTION EPIDURAL; INFILTRATION; INTRACAUDAL; PERINEURAL at 14:03

## 2022-06-14 ASSESSMENT — LIFESTYLE VARIABLES: SMOKING_STATUS: 1

## 2022-06-14 ASSESSMENT — PAIN SCALES - GENERAL
PAINLEVEL_OUTOF10: 0
PAINLEVEL_OUTOF10: 0

## 2022-06-14 ASSESSMENT — PAIN - FUNCTIONAL ASSESSMENT: PAIN_FUNCTIONAL_ASSESSMENT: 0-10

## 2022-06-14 NOTE — H&P
General Surgery - H&P  Dr. Aldo Cardona PA-C      The patient was seen and examined. There have been no changes to the H&P since the patient was seen in the office on 6/6/22. We will proceed with Mediport removal.       The patient was counseled at length about the risks of jaydon Covid-19 during their perioperative period and any recovery window from their procedure. The patient was made aware that jaydon Covid-19  may worsen their prognosis for recovering from their procedure  and lend to a higher morbidity and/or mortality risk. All material risks, benefits, and reasonable alternatives including postponing the procedure were discussed. The patient does wish to proceed with the procedure at this time.       Francy Dalton PA-C

## 2022-06-14 NOTE — OP NOTE
Operative Report:    Patient ID:  Robert Nolasco  [id number]  26 y.o.  1995    Indications: Lymphoma    Pre-operative Diagnosis: Lymphoma    Post-operative Diagnosis: same    Procedure:  Mediport removal    Surgeon: Richa Smith MD    Findings:  same    Estimated Blood Loss:  Minimal           Total IV Fluids: none            Complications:  None; patient tolerated the procedure well. Condition: stable      Procedure Details: The patient was seen again in the Holding Room. The risks, benefits, complications, treatment options, and expected outcomes were discussed with the patient. The possibilities of reaction to medication, pulmonary aspiration, bleeding, recurrent infection, the need for additional procedures, and development of a complication requiring transfusion or further operation were discussed with the patient and/or family. There was concurrence with the proposed plan, and informed consent was obtained. The site of surgery was properly noted/marked. The patient was taken to the Operating Room, identified as Robert Nolasco, and the procedure verified. A Time Out was held and the above information confirmed. The left chest was prepped and draped in a sterile fashion. 1% lidocaine was infiltrated and and incision was made over the mediport. The incision was deepened with scissors and the port was detached from the fascial sutures and carefully extracted from the pocket while holding pressure on the cath insertion site. After the wound was hemostatic, it was closed using 4-0 vicryl sutures. 4x4 dressing with tape was applied. Patient tolerated the procedure well. Instrument and lap counts were correct at the end of the case.      Richa Smith MD

## 2022-06-14 NOTE — ANESTHESIA POSTPROCEDURE EVALUATION
Department of Anesthesiology  Postprocedure Note    Patient: Claudeen Congress  MRN: [id number]  YOB: 1995  Date of evaluation: 6/14/2022  Time:  2:42 PM     Procedure Summary     Date: 06/14/22 Room / Location: [address] [place] [address]    Anesthesia Start: 4707 Anesthesia Stop: 7968    Procedure: PORT REMOVAL (N/A Chest) Diagnosis:       Burkitt lymphoma, unspecified body region ([address]      (Burkitt lymphoma, unspecified body region ([address] [C83.70])    Surgeons: Sunil Ferraro MD Responsible Provider: Irene Gutierrez MD    Anesthesia Type: MAC ASA Status: 2          Anesthesia Type: No value filed. Aristeo Phase I:      Aristeo Phase II: Aristeo Score: 10    Last vitals: Reviewed and per EMR flowsheets.        Anesthesia Post Evaluation    Patient location during evaluation: bedside  Patient participation: complete - patient participated  Level of consciousness: awake and alert  Pain score: 0  Airway patency: patent  Nausea & Vomiting: no vomiting and no nausea  Complications: no  Cardiovascular status: hemodynamically stable  Respiratory status: room air  Hydration status: stable

## 2022-06-14 NOTE — PROGRESS NOTES
1438- Pt returned to SDS rm 22 respirations even and unlabored, VSS, pt alert and oriented, report at bedside w/ Zebedee Motts and charline CRNA   6635- crackers and beverage provided to pt, tolerated well; family brought to bedside   1501- Discharge instructions reviewed w/ pt and pt mom at bedside, verbalized understanding   [phone]- pt belongings returned to him.  Pt dressing   5338- pt discharged via car w/ pt mom driving

## 2022-06-14 NOTE — ANESTHESIA PRE PROCEDURE
Department of Anesthesiology  Preprocedure Note       Name:  Laurence Navarro   Age:  27 y. o.  :  1995                                          MRN:  [id number]         Date:  2022      Surgeon: Rea Schmidt):  Martin Siegel MD    Procedure: Procedure(s):  PORT REMOVAL    Medications prior to admission:   Prior to Admission medications    Medication Sig Start Date End Date Taking? Authorizing Provider   escitalopram (LEXAPRO) 10 MG tablet Take 1 tablet by mouth daily 21  Adrianne Kyle PA-C   metFORMIN (GLUCOPHAGE) 500 MG tablet Take 1 tablet by mouth 2 times daily (with meals) 21  Adrianne Kyle PA-C   pantoprazole (PROTONIX) 40 MG tablet Take 40 mg by mouth daily    Historical Provider, MD   fluconazole (DIFLUCAN) 200 MG tablet Take 200 mg by mouth daily    Historical Provider, MD   prochlorperazine (COMPAZINE) 10 MG tablet Take 10 mg by mouth every 6 hours as needed    Historical Provider, MD   ondansetron (ZOFRAN) 4 MG tablet Take 4 mg by mouth every 8 hours as needed for Nausea or Vomiting    Historical Provider, MD   insulin aspart (NOVOLOG FLEXPEN) 100 UNIT/ML injection pen Sliding scale ( pt unsure units) 21   Adrianne Kyle PA-C   naproxen (NAPROSYN) 500 MG tablet Take 1 tablet by mouth 2 times daily 19   Ayesha Larios PA-C   acetaminophen (APAP EXTRA STRENGTH) 500 MG tablet Take 1 tablet by mouth every 6 hours as needed for Pain 18   ESTER Cheung       Current medications:    No current facility-administered medications for this visit. No current outpatient medications on file.      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    Unspecified B-cell lymphoma, unspecified site (HCC) C85.10    Type 1 diabetes mellitus without complication (HCC) A57.1    Major depressive disorder with current active episode F32.9    Burkitt's lymphoma ([facility] C83.70       Past Medical History:        Diagnosis Date    Asthma     Burkitt lymphoma ([facility] 3/25/2021    Burkitt's lymphoma ([facility] 6/4/2021    Diabetes mellitus ([facility]     High grade B-cell lymphoma ([facility]     [facility] 3/29/2021       Past Surgical History:        Procedure Laterality Date    PORT SURGERY N/A 2/24/2021    PORT INSERTION performed by Kaela Sosa MD at [facility]  04/2021    @ [facility]       Social History:    Social History     Tobacco Use    Smoking status: Current Every Day Smoker     Packs/day: 1.00     Years: 6.00     Pack years: 6.00     Types: Cigarettes     Start date: 2016    Smokeless tobacco: Never Used   Substance Use Topics    Alcohol use: No                                Ready to quit: Not Answered  Counseling given: Not Answered      Vital Signs (Current): There were no vitals filed for this visit.                                            BP Readings from Last 3 Encounters:   06/14/22 122/79   06/06/22 120/86   03/30/22 (!) 144/106       NPO Status:                                                                                 BMI:   Wt Readings from Last 3 Encounters:   06/14/22 220 lb (99.8 kg)   06/06/22 228 lb (103.4 kg)   03/30/22 190 lb (86.2 kg)     There is no height or weight on file to calculate BMI.    CBC:   Lab Results   Component Value Date    WBC 7.0 08/02/2021    RBC 3.64 08/02/2021    HGB 11.4 08/02/2021    HCT 34.1 08/02/2021    MCV 93.7 08/02/2021    RDW 14.4 08/02/2021     08/02/2021       CMP:   Lab Results   Component Value Date     08/02/2021    K 4.0 08/02/2021    CL 108 08/02/2021    CO2 27 08/02/2021    BUN 9 08/02/2021    CREATININE 0.9 08/02/2021    GFRAA >60 08/02/2021    LABGLOM >60 08/02/2021    GLUCOSE 96 08/02/2021    PROT 6.3 08/02/2021    CALCIUM 9.5 08/02/2021    BILITOT 0.2 08/02/2021    ALKPHOS 78 08/02/2021    AST 15 08/02/2021    ALT 11 08/02/2021       POC Tests:   Recent Labs     06/14/22  1040   POCGLU 258*       Coags:   Lab Results   Component Value Date    PROTIME 14.8 02/18/2021    INR 1.22 02/18/2021       HCG (If Applicable): No results found for: PREGTESTUR, PREGSERUM, HCG, HCGQUANT     ABGs: No results found for: PHART, PO2ART, SEY6PST, TDG0BTD, BEART, X9WYBEOE     Type & Screen (If Applicable):  No results found for: LABABO, LABRH    Drug/Infectious Status (If Applicable):  Lab Results   Component Value Date    HEPCAB NON REACTIVE 02/23/2021       COVID-19 Screening (If Applicable):   Lab Results   Component Value Date    COVID19 NOT DETECTED 02/18/2021         Anesthesia Evaluation  Patient summary reviewed no history of anesthetic complications:   Airway: Mallampati: I  TM distance: >3 FB   Neck ROM: full  Mouth opening: > = 3 FB   Dental: normal exam         Pulmonary: breath sounds clear to auscultation  (+) asthma: current smoker                           Cardiovascular:  Exercise tolerance: good (>4 METS),         ECG reviewed  Rhythm: regular  Rate: normal  Echocardiogram reviewed               ROS comment: Sinus tachycardia   Nonspecific T wave abnormality   Abnormal ECG   When compared with ECG of 05-NOV-2020 16:13,   ST no longer elevated in Inferior leads   Nonspecific T wave abnormality now evident in Inferior leads   Nonspecific T wave abnormality, worse in Lateral leads   Confirmed by Jag Macdonald ([id number]) on 2/21/2021 5:09:25 PM      Neuro/Psych:   Negative Neuro/Psych ROS              GI/Hepatic/Renal: Neg GI/Hepatic/Renal ROS            Endo/Other:    (+) DiabetesType II DM, , malignancy/cancer (stage 3b Burkitt's lymphoma).           Pt had no PAT visit        ROS comment: In remission for 1 year now. Abdominal:             Vascular: negative vascular ROS. Other Findings:             Anesthesia Plan      MAC     ASA 2       Induction: intravenous. Anesthetic plan and risks discussed with patient. Plan discussed with CRNA.                     BRAYAN Molina - NAOMI   6/14/2022

## 2022-06-20 ENCOUNTER — OFFICE VISIT (OUTPATIENT)
Dept: SURGERY | Age: 27
End: 2022-06-20

## 2022-06-20 VITALS
OXYGEN SATURATION: 99 % | HEART RATE: 86 BPM | SYSTOLIC BLOOD PRESSURE: 110 MMHG | WEIGHT: 219.4 LBS | BODY MASS INDEX: 26.72 KG/M2 | HEIGHT: 76 IN | DIASTOLIC BLOOD PRESSURE: 80 MMHG

## 2022-06-20 DIAGNOSIS — Z48.89 ENCOUNTER FOR POSTOPERATIVE CARE: Primary | ICD-10-CM

## 2022-06-20 PROCEDURE — 99024 POSTOP FOLLOW-UP VISIT: CPT | Performed by: PHYSICIAN ASSISTANT

## 2022-06-20 PROCEDURE — APPNB15 APP NON BILLABLE TIME 0-15 MINS: Performed by: PHYSICIAN ASSISTANT

## 2022-06-20 NOTE — PROGRESS NOTES
Chief Complaint   Patient presents with    Post-Op Check     1st P/O Mediport Removal @ [facility] 6/14/22         SUBJECTIVE:  Patient presents today for his 1st post op visit following mediport removal.  Pt reports that pain is none. Incisions: minbruising and no discharge. Glue intact.     Past Surgical History:   Procedure Laterality Date    PORT SURGERY N/A 2/24/2021    PORT INSERTION performed by Maria Del Rosario Ash MD at [address] [place] [address] N/A 6/14/2022    PORT REMOVAL performed by Laz Avila MD at [address] [place] [address]  04/2021    @ [facility]     Past Medical History:   Diagnosis Date    Asthma     Burkitt lymphoma ([facility] 3/25/2021    Burkitt's lymphoma ([facility] 6/4/2021    Diabetes mellitus ([facility]     High grade B-cell lymphoma (HCC)     Unspecified B-cell lymphoma, unspecified site ([facility] 3/29/2021     Family History   Problem Relation Age of Onset    Diabetes type 2  Father     Cancer Maternal Aunt     Diabetes type 2  Maternal Grandmother      Social History     Socioeconomic History    Marital status: Single     Spouse name: Not on file    Number of children: Not on file    Years of education: Not on file    Highest education level: Not on file   Occupational History    Not on file   Tobacco Use    Smoking status: Current Every Day Smoker     Packs/day: 0.75     Years: 6.00     Pack years: 4.50     Types: Cigarettes     Start date: 2016    Smokeless tobacco: Never Used   Vaping Use    Vaping Use: Never used   Substance and Sexual Activity    Alcohol use: No    Drug use: No    Sexual activity: Yes   Other Topics Concern    Not on file   Social History Narrative    Not on file     Social Determinants of Health     Financial Resource Strain:     Difficulty of Paying Living Expenses: Not on file   Food Insecurity:     Worried About Running Out of Food in the Last Year: Not on file    Shi of Food in the Last Year: Not on file   Transportation Needs:     Lack of Transportation (Medical): Not on file    Lack of Transportation (Non-Medical): Not on file   Physical Activity:     Days of Exercise per Week: Not on file    Minutes of Exercise per Session: Not on file   Stress:     Feeling of Stress : Not on file   Social Connections:     Frequency of Communication with Friends and Family: Not on file    Frequency of Social Gatherings with Friends and Family: Not on file    Attends Yarsanism Services: Not on file    Active Member of [address] [place] [address] or Organizations: Not on file    Attends Club or Organization Meetings: Not on file    Marital Status: Not on file   Intimate Partner Violence:     Fear of Current or Ex-Partner: Not on file    Emotionally Abused: Not on file    Physically Abused: Not on file    Sexually Abused: Not on file   Housing Stability:     Unable to Pay for Housing in the Last Year: Not on file    Number of [place] in the Last Year: Not on file    Unstable Housing in the Last Year: Not on file       OBJECTIVE:  Physical Exam  Constitutional:       Appearance: He is well-developed. HENT:      Head: Normocephalic. Eyes:      Pupils: Pupils are equal, round, and reactive to light. Cardiovascular:      Rate and Rhythm: Normal rate. Pulmonary:      Effort: Pulmonary effort is normal.   Chest:          Comments: L chest port incision well healed. Abdominal:      General: There is no distension. Palpations: Abdomen is soft. There is no mass. Tenderness: There is no abdominal tenderness. There is no guarding or rebound. Musculoskeletal:         General: Normal range of motion. Cervical back: Normal range of motion and neck supple. Skin:     General: Skin is warm. Neurological:      Mental Status: He is alert and oriented to person, place, and time. Path reviewed:  Final Pathologic Diagnosis:   Medical device, clinically Mediport.       ASSESSMENT:  Patient doing well on this post operative check.   Wounds well healed. 1. Encounter for postoperative care        PLAN:  Normal activities with no limitations. No orders of the defined types were placed in this encounter. No orders of the defined types were placed in this encounter. Follow Up: Return if symptoms worsen or fail to improve.     Francy Dalton PA-C

## 2022-06-21 ASSESSMENT — ENCOUNTER SYMPTOMS
CHOKING: 0
SORE THROAT: 0
COLOR CHANGE: 0
APNEA: 0
ANAL BLEEDING: 0
EYE REDNESS: 0
EYE ITCHING: 0
BACK PAIN: 0
CONSTIPATION: 0
RECTAL PAIN: 0
STRIDOR: 0
PHOTOPHOBIA: 0

## 2022-06-22 NOTE — PROGRESS NOTES
Chief Complaint   Patient presents with    Follow-up     Discuss port Removal(kh placed 2/24/21)         SUBJECTIVE:  HPI: Patient is herewith to discuss Mediport removal.  Patient has completed treatment for Burkitt's lymphoma. He has done well and is currently in remission and wants to have his Mediport removed. .    I have reviewed the patient's(pertinent information to this visit) medical history, family history(scanned in  the [address] [place] [address] under \"patient questioner\"), social history and review of systems with the patient today in the office.             Past Surgical History:   Procedure Laterality Date    PORT SURGERY N/A 2/24/2021    PORT INSERTION performed by Isidro Barton MD at [address] [place] [address] N/A 6/14/2022    PORT REMOVAL performed by Almaz Nunez MD at [place] [address]  04/2021    @ [facility]     Past Medical History:   Diagnosis Date    Asthma     Burkitt lymphoma ([facility] 3/25/2021    Burkitt's lymphoma ([facility] 6/4/2021    Diabetes mellitus ([facility]     High grade B-cell lymphoma (HCC)     Unspecified B-cell lymphoma, unspecified site ([facility] 3/29/2021     Family History   Problem Relation Age of Onset    Diabetes type 2  Father     Cancer Maternal Aunt     Diabetes type 2  Maternal Grandmother      Social History     Socioeconomic History    Marital status: Single     Spouse name: Not on file    Number of children: Not on file    Years of education: Not on file    Highest education level: Not on file   Occupational History    Not on file   Tobacco Use    Smoking status: Current Every Day Smoker     Packs/day: 0.75     Years: 6.00     Pack years: 4.50     Types: Cigarettes     Start date: 2016    Smokeless tobacco: Never Used   Vaping Use    Vaping Use: Never used   Substance and Sexual Activity    Alcohol use: No    Drug use: No    Sexual activity: Yes   Other Topics Concern    Not on file   Social History Narrative    Not on file     Social Determinants of Health pt unsure units) 5 pen 1    naproxen (NAPROSYN) 500 MG tablet Take 1 tablet by mouth 2 times daily 60 tablet 0    acetaminophen (APAP EXTRA STRENGTH) 500 MG tablet Take 1 tablet by mouth every 6 hours as needed for Pain 30 tablet 0     No current facility-administered medications for this visit. No Known Allergies    Review of Systems:         Review of Systems   Constitutional: Negative for chills and fever. HENT: Negative for ear pain, mouth sores, sore throat and tinnitus. Eyes: Negative for photophobia, redness and itching. Respiratory: Negative for apnea, choking and stridor. Cardiovascular: Negative for chest pain and palpitations. Gastrointestinal: Negative for anal bleeding, constipation and rectal pain. Endocrine: Negative for polydipsia. Genitourinary: Negative for enuresis, flank pain and hematuria. Musculoskeletal: Negative for back pain, joint swelling and myalgias. Skin: Negative for color change and pallor. Allergic/Immunologic: Negative for environmental allergies. Neurological: Negative for syncope and speech difficulty. Psychiatric/Behavioral: Negative for confusion and hallucinations. OBJECTIVE:  Physical Exam:    /86 (Site: Left Upper Arm, Position: Sitting, Cuff Size: Medium Adult)   Pulse 83   Ht 6' 4\" (1.93 m)   Wt 228 lb (103.4 kg)   SpO2 98%   BMI 27.75 kg/m²      Physical Exam  Constitutional:       Appearance: He is well-developed. HENT:      Head: Normocephalic. Eyes:      Pupils: Pupils are equal, round, and reactive to light. Cardiovascular:      Rate and Rhythm: Normal rate. Pulmonary:      Effort: Pulmonary effort is normal.   Abdominal:      General: There is no distension. Palpations: Abdomen is soft. There is no mass. Tenderness: There is no abdominal tenderness. There is no guarding or rebound. Musculoskeletal:         General: Normal range of motion. Cervical back: Normal range of motion and neck supple. Skin:     General: Skin is warm. Neurological:      Mental Status: He is alert and oriented to person, place, and time. ASSESSMENT:  1. Burkitt lymphoma, unspecified body region [facility])          PLAN:  Treatment: We will proceed with Mediport removal under MAC. Patient counseled on risks, benefits, and alternatives of treatment plan at length today. Patient states an understanding and willingness to proceed with plan. No orders of the defined types were placed in this encounter. No orders of the defined types were placed in this encounter. Follow Up:  No follow-ups on file.       Yamileth Thorpe MD

## 2022-07-06 ASSESSMENT — ENCOUNTER SYMPTOMS
SORE THROAT: 0
EYE ITCHING: 0
BACK PAIN: 0
RECTAL PAIN: 0
PHOTOPHOBIA: 0
COLOR CHANGE: 0
CHOKING: 0
EYE REDNESS: 0
APNEA: 0
CONSTIPATION: 0
STRIDOR: 0
ANAL BLEEDING: 0

## 2022-07-06 NOTE — H&P
Álvaro Linares MD      General Surgery       Subjective:     Patient is a 27 y.o.  male scheduled for mediport removal. Indications for procedure are mediport in place. Pt with cancer hx, who is s/p chemotherapy. Treatment has been completed, and patient is in remission. Pt would like port removed. Patient Active Problem List    Diagnosis Date Noted    Burkitt's lymphoma ([address] 06/04/2021    Type 1 diabetes mellitus without complication ([address] 33/74/6480    Major depressive disorder with current active episode 04/05/2021    Unspecified B-cell lymphoma, unspecified site ([address] 03/29/2021    Burkitt lymphoma ([address] 03/25/2021    High grade B-cell lymphoma ([address] 03/15/2021    Mesenteric mass 02/18/2021     Past Medical History:   Diagnosis Date    Asthma     Burkitt lymphoma ([address] 3/25/2021    Burkitt's lymphoma ([address] 6/4/2021    Diabetes mellitus ([address]     High grade B-cell lymphoma ([address]     Unspecified B-cell lymphoma, unspecified site ([address] 3/29/2021      Past Surgical History:   Procedure Laterality Date    PORT SURGERY N/A 2/24/2021    PORT INSERTION performed by Carrillo Quintana MD at [facility] N/A 6/14/2022    PORT REMOVAL performed by Jaylin Linares MD at [address] [place] [address]  04/2021    @ [facility]      Prior to Admission medications    Medication Sig Start Date End Date Taking?  Authorizing Provider   escitalopram (LEXAPRO) 10 MG tablet Take 1 tablet by mouth daily 9/16/21 6/20/22  Key Basurto PA-C   metFORMIN (GLUCOPHAGE) 500 MG tablet Take 1 tablet by mouth 2 times daily (with meals) 9/16/21 6/20/22  Key Basurto PA-C   pantoprazole (PROTONIX) 40 MG tablet Take 40 mg by mouth daily  Patient not taking: Reported on 6/20/2022    Historical Provider, MD   fluconazole (DIFLUCAN) 200 MG tablet Take 200 mg by mouth daily    Historical Provider, MD   prochlorperazine (COMPAZINE) 10 MG tablet Take 10 mg by mouth every 6 hours as needed Historical Provider, MD   ondansetron (ZOFRAN) 4 MG tablet Take 4 mg by mouth every 8 hours as needed for Nausea or Vomiting    Historical Provider, MD   insulin aspart (NOVOLOG FLEXPEN) 100 UNIT/ML injection pen Sliding scale ( pt unsure units) 4/5/21   Sabrina Middleton PA-C   naproxen (NAPROSYN) 500 MG tablet Take 1 tablet by mouth 2 times daily 9/9/19   Eren Larios PA-C   acetaminophen (APAP EXTRA STRENGTH) 500 MG tablet Take 1 tablet by mouth every 6 hours as needed for Pain 8/11/18   ESTER Chin     No Known Allergies   Social History     Tobacco Use    Smoking status: Current Every Day Smoker     Packs/day: 0.75     Years: 6.00     Pack years: 4.50     Types: Cigarettes     Start date: 2016    Smokeless tobacco: Never Used   Substance Use Topics    Alcohol use: No      Family History   Problem Relation Age of Onset    Diabetes type 2  Father     Cancer Maternal Aunt     Diabetes type 2  Maternal Grandmother           Review of Systems  Review of Systems   Constitutional: Negative for chills and fever. HENT: Negative for ear pain, mouth sores, sore throat and tinnitus. Eyes: Negative for photophobia, redness and itching. Respiratory: Negative for apnea, choking and stridor. Cardiovascular: Negative for chest pain and palpitations. Gastrointestinal: Negative for anal bleeding, constipation and rectal pain. Endocrine: Negative for polydipsia. Genitourinary: Negative for enuresis, flank pain and hematuria. Musculoskeletal: Negative for back pain, joint swelling and myalgias. Skin: Negative for color change and pallor. Allergic/Immunologic: Negative for environmental allergies. Neurological: Negative for syncope and speech difficulty. Psychiatric/Behavioral: Negative for confusion and hallucinations. Objective:     No data found. Physical Exam  Constitutional:       Appearance: He is well-developed. HENT:      Head: Normocephalic.    Eyes:      Pupils: Pupils

## 2022-11-12 ENCOUNTER — HOSPITAL ENCOUNTER (EMERGENCY)
Age: 27
Discharge: HOME | End: 2022-11-13
Payer: COMMERCIAL

## 2022-11-12 ENCOUNTER — APPOINTMENT (OUTPATIENT)
Dept: CT IMAGING | Age: 27
End: 2022-11-12
Payer: COMMERCIAL

## 2022-11-12 DIAGNOSIS — R73.9 HYPERGLYCEMIA: ICD-10-CM

## 2022-11-12 DIAGNOSIS — K85.90 ACUTE PANCREATITIS, UNSPECIFIED COMPLICATION STATUS, UNSPECIFIED PANCREATITIS TYPE: Primary | ICD-10-CM

## 2022-11-12 LAB
ALBUMIN SERPL-MCNC: 4.3 GM/DL (ref 3.4–5)
ALP BLD-CCNC: 121 IU/L (ref 40–129)
ALT SERPL-CCNC: 21 U/L (ref 10–40)
ANION GAP SERPL CALCULATED.3IONS-SCNC: 12 MMOL/L (ref 4–16)
AST SERPL-CCNC: 18 IU/L (ref 15–37)
BASE EXCESS: 0 (ref 0–3.3)
BASOPHILS ABSOLUTE: 0.1 K/CU MM
BASOPHILS RELATIVE PERCENT: 0.3 % (ref 0–1)
BETA-HYDROXYBUTYRATE: 2.2 MG/DL (ref 0–3)
BILIRUB SERPL-MCNC: 0.4 MG/DL (ref 0–1)
BILIRUBIN URINE: NEGATIVE MG/DL
BLOOD, URINE: NEGATIVE
BUN BLDV-MCNC: 11 MG/DL (ref 6–23)
CALCIUM SERPL-MCNC: 9.5 MG/DL (ref 8.3–10.6)
CHLORIDE BLD-SCNC: 96 MMOL/L (ref 99–110)
CLARITY: CLEAR
CO2: 26 MMOL/L (ref 21–32)
COLOR: YELLOW
CREAT SERPL-MCNC: 0.9 MG/DL (ref 0.9–1.3)
DIFFERENTIAL TYPE: ABNORMAL
EOSINOPHILS ABSOLUTE: 0.2 K/CU MM
EOSINOPHILS RELATIVE PERCENT: 0.9 % (ref 0–3)
GFR SERPL CREATININE-BSD FRML MDRD: >60 ML/MIN/1.73M2
GLUCOSE BLD-MCNC: 258 MG/DL
GLUCOSE BLD-MCNC: 258 MG/DL (ref 70–99)
GLUCOSE BLD-MCNC: 419 MG/DL (ref 70–99)
GLUCOSE, URINE: >1000 MG/DL
HCO3 VENOUS: 27.1 MMOL/L (ref 19–25)
HCT VFR BLD CALC: 45.9 % (ref 42–52)
HEMOGLOBIN: 15.9 GM/DL (ref 13.5–18)
IMMATURE NEUTROPHIL %: 0.6 % (ref 0–0.43)
KETONES, URINE: NEGATIVE MG/DL
LACTATE: 1.6 MMOL/L (ref 0.4–2)
LEUKOCYTE ESTERASE, URINE: NEGATIVE
LIPASE: 601 IU/L (ref 13–60)
LYMPHOCYTES ABSOLUTE: 1.2 K/CU MM
LYMPHOCYTES RELATIVE PERCENT: 6.2 % (ref 24–44)
MCH RBC QN AUTO: 30.6 PG (ref 27–31)
MCHC RBC AUTO-ENTMCNC: 34.6 % (ref 32–36)
MCV RBC AUTO: 88.3 FL (ref 78–100)
MONOCYTES ABSOLUTE: 0.6 K/CU MM
MONOCYTES RELATIVE PERCENT: 3.4 % (ref 0–4)
NITRITE URINE, QUANTITATIVE: NEGATIVE
NUCLEATED RBC %: 0 %
O2 SAT, VEN: 76.6 % (ref 50–70)
PCO2, VEN: 55 MMHG (ref 38–52)
PDW BLD-RTO: 11.6 % (ref 11.7–14.9)
PH VENOUS: 7.3 (ref 7.32–7.42)
PH, URINE: 6 (ref 5–8)
PLATELET # BLD: 196 K/CU MM (ref 140–440)
PMV BLD AUTO: 10 FL (ref 7.5–11.1)
PO2, VEN: 47 MMHG (ref 28–48)
POTASSIUM SERPL-SCNC: 4.4 MMOL/L (ref 3.5–5.1)
PROTEIN UA: NEGATIVE MG/DL
RBC # BLD: 5.2 M/CU MM (ref 4.6–6.2)
SEGMENTED NEUTROPHILS ABSOLUTE COUNT: 16.7 K/CU MM
SEGMENTED NEUTROPHILS RELATIVE PERCENT: 88.6 % (ref 36–66)
SODIUM BLD-SCNC: 134 MMOL/L (ref 135–145)
SPECIFIC GRAVITY UA: 1.01 (ref 1–1.03)
TOTAL IMMATURE NEUTOROPHIL: 0.12 K/CU MM
TOTAL NUCLEATED RBC: 0 K/CU MM
TOTAL PROTEIN: 7 GM/DL (ref 6.4–8.2)
UROBILINOGEN, URINE: 0.2 MG/DL (ref 0.2–1)
WBC # BLD: 18.8 K/CU MM (ref 4–10.5)

## 2022-11-12 PROCEDURE — 83690 ASSAY OF LIPASE: CPT

## 2022-11-12 PROCEDURE — 96375 TX/PRO/DX INJ NEW DRUG ADDON: CPT

## 2022-11-12 PROCEDURE — 99285 EMERGENCY DEPT VISIT HI MDM: CPT

## 2022-11-12 PROCEDURE — 96374 THER/PROPH/DIAG INJ IV PUSH: CPT

## 2022-11-12 PROCEDURE — 6360000004 HC RX CONTRAST MEDICATION: Performed by: PHYSICIAN ASSISTANT

## 2022-11-12 PROCEDURE — 82010 KETONE BODYS QUAN: CPT

## 2022-11-12 PROCEDURE — 85025 COMPLETE CBC W/AUTO DIFF WBC: CPT

## 2022-11-12 PROCEDURE — 2580000003 HC RX 258: Performed by: PHYSICIAN ASSISTANT

## 2022-11-12 PROCEDURE — 96376 TX/PRO/DX INJ SAME DRUG ADON: CPT

## 2022-11-12 PROCEDURE — 81003 URINALYSIS AUTO W/O SCOPE: CPT

## 2022-11-12 PROCEDURE — 83605 ASSAY OF LACTIC ACID: CPT

## 2022-11-12 PROCEDURE — C9113 INJ PANTOPRAZOLE SODIUM, VIA: HCPCS | Performed by: PHYSICIAN ASSISTANT

## 2022-11-12 PROCEDURE — 82962 GLUCOSE BLOOD TEST: CPT

## 2022-11-12 PROCEDURE — 82805 BLOOD GASES W/O2 SATURATION: CPT

## 2022-11-12 PROCEDURE — 74177 CT ABD & PELVIS W/CONTRAST: CPT

## 2022-11-12 PROCEDURE — 6360000002 HC RX W HCPCS: Performed by: PHYSICIAN ASSISTANT

## 2022-11-12 PROCEDURE — 80053 COMPREHEN METABOLIC PANEL: CPT

## 2022-11-12 RX ORDER — SODIUM CHLORIDE 0.9 % (FLUSH) 0.9 %
5-40 SYRINGE (ML) INJECTION 2 TIMES DAILY
Status: DISCONTINUED | OUTPATIENT
Start: 2022-11-12 | End: 2022-11-13 | Stop reason: HOSPADM

## 2022-11-12 RX ORDER — ONDANSETRON 2 MG/ML
4 INJECTION INTRAMUSCULAR; INTRAVENOUS EVERY 6 HOURS PRN
Status: DISCONTINUED | OUTPATIENT
Start: 2022-11-12 | End: 2022-11-13 | Stop reason: HOSPADM

## 2022-11-12 RX ORDER — MORPHINE SULFATE 4 MG/ML
4 INJECTION, SOLUTION INTRAMUSCULAR; INTRAVENOUS EVERY 30 MIN PRN
Status: DISCONTINUED | OUTPATIENT
Start: 2022-11-12 | End: 2022-11-13 | Stop reason: HOSPADM

## 2022-11-12 RX ORDER — ONDANSETRON 2 MG/ML
4 INJECTION INTRAMUSCULAR; INTRAVENOUS ONCE
Status: DISCONTINUED | OUTPATIENT
Start: 2022-11-12 | End: 2022-11-13 | Stop reason: HOSPADM

## 2022-11-12 RX ORDER — PANTOPRAZOLE SODIUM 40 MG/10ML
40 INJECTION, POWDER, LYOPHILIZED, FOR SOLUTION INTRAVENOUS ONCE
Status: COMPLETED | OUTPATIENT
Start: 2022-11-12 | End: 2022-11-12

## 2022-11-12 RX ORDER — 0.9 % SODIUM CHLORIDE 0.9 %
1000 INTRAVENOUS SOLUTION INTRAVENOUS ONCE
Status: COMPLETED | OUTPATIENT
Start: 2022-11-12 | End: 2022-11-12

## 2022-11-12 RX ADMIN — ONDANSETRON 4 MG: 2 INJECTION INTRAMUSCULAR; INTRAVENOUS at 21:56

## 2022-11-12 RX ADMIN — SODIUM CHLORIDE 1000 ML: 9 INJECTION, SOLUTION INTRAVENOUS at 20:11

## 2022-11-12 RX ADMIN — IOPAMIDOL 75 ML: 755 INJECTION, SOLUTION INTRAVENOUS at 21:50

## 2022-11-12 RX ADMIN — MORPHINE SULFATE 4 MG: 4 INJECTION, SOLUTION INTRAMUSCULAR; INTRAVENOUS at 20:15

## 2022-11-12 RX ADMIN — MORPHINE SULFATE 4 MG: 4 INJECTION, SOLUTION INTRAMUSCULAR; INTRAVENOUS at 22:01

## 2022-11-12 RX ADMIN — ONDANSETRON 4 MG: 2 INJECTION INTRAMUSCULAR; INTRAVENOUS at 20:14

## 2022-11-12 RX ADMIN — PANTOPRAZOLE SODIUM 40 MG: 40 INJECTION, POWDER, FOR SOLUTION INTRAVENOUS at 20:17

## 2022-11-12 ASSESSMENT — PAIN DESCRIPTION - FREQUENCY: FREQUENCY: CONTINUOUS

## 2022-11-12 ASSESSMENT — PAIN DESCRIPTION - LOCATION
LOCATION: ABDOMEN

## 2022-11-12 ASSESSMENT — PAIN SCALES - GENERAL
PAINLEVEL_OUTOF10: 5
PAINLEVEL_OUTOF10: 6
PAINLEVEL_OUTOF10: 6
PAINLEVEL_OUTOF10: 5

## 2022-11-12 ASSESSMENT — PAIN DESCRIPTION - DESCRIPTORS
DESCRIPTORS: CRAMPING
DESCRIPTORS: BURNING

## 2022-11-12 ASSESSMENT — PAIN DESCRIPTION - ORIENTATION: ORIENTATION: MID;UPPER

## 2022-11-12 ASSESSMENT — PAIN DESCRIPTION - PAIN TYPE: TYPE: ACUTE PAIN

## 2022-11-13 VITALS
WEIGHT: 225 LBS | SYSTOLIC BLOOD PRESSURE: 124 MMHG | HEIGHT: 76 IN | RESPIRATION RATE: 18 BRPM | HEART RATE: 88 BPM | TEMPERATURE: 97.6 F | OXYGEN SATURATION: 98 % | BODY MASS INDEX: 27.4 KG/M2 | DIASTOLIC BLOOD PRESSURE: 82 MMHG

## 2022-11-13 RX ORDER — DICYCLOMINE HYDROCHLORIDE 10 MG/1
20 CAPSULE ORAL
Qty: 30 CAPSULE | Refills: 0 | Status: SHIPPED | OUTPATIENT
Start: 2022-11-13

## 2022-11-13 RX ORDER — ONDANSETRON 4 MG/1
4 TABLET, ORALLY DISINTEGRATING ORAL EVERY 6 HOURS
Qty: 20 TABLET | Refills: 0 | Status: SHIPPED | OUTPATIENT
Start: 2022-11-13

## 2022-11-13 NOTE — ED PROVIDER NOTES
[facility] [address] EMERGENCY DEPARTMENT    Patient: Mora Smith  MRN: [id number]  : 1995  Date of Evaluation: 2022  ED Provider: Brian Hurst PA-C    Chief Complaint       Chief Complaint   Patient presents with    Abdominal Pain     Hx of abd cancer, in remission, pain started this morning    Nausea       KAMILLA Smith is a 24 y.o. male who presents to the emergency department for abdominal pain. Onset was this morning after working out. Localized to the epigastric area. Characterized as a cramping sensation. Severity 6/10. Associated nausea. Denies fever, chills, cp, sob, cough, congestion, vomiting, diarrhea. He has a history of Burkitt's lymphoma in remission x about 15 months. He has follow up with his oncologist in 2 days. ROS     CONSTITUTIONAL:  Denies fever. EYES:  Denies visual changes. HEAD:  Denies headache. ENT:  Denies earache, nasal congestion, sore throat. NECK:  Denies neck pain. RESPIRATORY:  Denies any shortness of breath. CARDIOVASCULAR:  Denies chest pain. GI:  + abd pain, nausea. :  Denies urinary symptoms. MUSCULOSKELETAL:  Denies extremity pain or swelling. BACK:  Denies back pain. INTEGUMENT:  Denies skin changes. LYMPHATIC:  Denies lymphadenopathy. NEUROLOGIC:  Denies any numbness/tingling. PSYCHIATRIC:  Denies SI/HI.     Past History     Past Medical History:   Diagnosis Date    Asthma     Burkitt lymphoma ([address] 3/25/2021    Burkitt's lymphoma ([address] 2021    Diabetes mellitus ([address]     High grade B-cell lymphoma ([address]     Unspecified B-cell lymphoma, unspecified site ([address] 3/29/2021     Past Surgical History:   Procedure Laterality Date    PORT SURGERY N/A 2021    [facility]z [facility] [address] [facility]    PORT REMOVAL performed by Chauncey Erickson MD at [address] [place] [address]  2021    @ [address] [facility] History     Socioeconomic History    Marital status: Single     Spouse name: None    Number of children: None    Years of education: None    Highest education level: None   Tobacco Use    Smoking status: Every Day     Packs/day: 0.75     Years: 6.00     Pack years: 4.50     Types: Cigarettes     Start date: 2016    Smokeless tobacco: Never   Vaping Use    Vaping Use: Never used   Substance and Sexual Activity    Alcohol use: No    Drug use: No    Sexual activity: Yes       Medications/Allergies     Discharge Medication List as of 11/13/2022  1:23 AM        CONTINUE these medications which have NOT CHANGED    Details   escitalopram (LEXAPRO) 10 MG tablet Take 1 tablet by mouth daily, Disp-90 tablet, R-1Normal      metFORMIN (GLUCOPHAGE) 500 MG tablet Take 1 tablet by mouth 2 times daily (with meals), Disp-60 tablet, R-2Normal      pantoprazole (PROTONIX) 40 MG tablet Take 40 mg by mouth dailyHistorical Med      fluconazole (DIFLUCAN) 200 MG tablet Take 200 mg by mouth dailyHistorical Med      prochlorperazine (COMPAZINE) 10 MG tablet Take 10 mg by mouth every 6 hours as neededHistorical Med      ondansetron (ZOFRAN) 4 MG tablet Take 4 mg by mouth every 8 hours as needed for Nausea or VomitingHistorical Med      insulin aspart (NOVOLOG FLEXPEN) 100 UNIT/ML injection pen Sliding scale ( pt unsure units), Disp-5 pen, R-1Normal      naproxen (NAPROSYN) 500 MG tablet Take 1 tablet by mouth 2 times daily, Disp-60 tablet, R-0Print      acetaminophen (APAP EXTRA STRENGTH) 500 MG tablet Take 1 tablet by mouth every 6 hours as needed for Pain, Disp-30 tablet, R-0Print           No Known Allergies     Physical Exam       ED Triage Vitals [11/12/22 1928]   BP Temp Temp Source Heart Rate Resp SpO2 Height Weight   (!) 143/90 97.6 °F (36.4 °C) Oral 80 18 97 % -- 225 lb (102.1 kg)     GENERAL APPEARANCE:  Well-developed, well-nourished, no acute distress. HEAD:  NC/AT. EYES:  Sclera anicteric.    ENT:  Ears, nose, mouth normal. NECK:  Supple. CARDIO:  RRR. LUNGS:   CTAB. Respirations unlabored. ABDOMEN:  Soft, non-distended, mild epigastric ttp without rebound or guarding. BS active. BACK:  No midline thoracic or lumbar spinal tenderness. EXTREMITIES:  No acute deformities. SKIN:  Warm and dry. NEUROLOGICAL:  Alert and oriented. PSYCHIATRIC:  Normal mood.      Diagnostics     Labs:  Results for orders placed or performed during the hospital encounter of 11/12/22   CBC with Auto Differential   Result Value Ref Range    WBC 18.8 (H) 4.0 - 10.5 K/CU MM    RBC 5.20 4.6 - 6.2 M/CU MM    Hemoglobin 15.9 13.5 - 18.0 GM/DL    Hematocrit 45.9 42 - 52 %    MCV 88.3 78 - 100 FL    MCH 30.6 27 - 31 PG    MCHC 34.6 32.0 - 36.0 %    RDW 11.6 (L) 11.7 - 14.9 %    Platelets 406 019 - 968 K/CU MM    MPV 10.0 7.5 - 11.1 FL    Differential Type AUTOMATED DIFFERENTIAL     Segs Relative 88.6 (H) 36 - 66 %    Lymphocytes % 6.2 (L) 24 - 44 %    Monocytes % 3.4 0 - 4 %    Eosinophils % 0.9 0 - 3 %    Basophils % 0.3 0 - 1 %    Segs Absolute 16.7 K/CU MM    Lymphocytes Absolute 1.2 K/CU MM    Monocytes Absolute 0.6 K/CU MM    Eosinophils Absolute 0.2 K/CU MM    Basophils Absolute 0.1 K/CU MM    Nucleated RBC % 0.0 %    Total Nucleated RBC 0.0 K/CU MM    Total Immature Neutrophil 0.12 K/CU MM    Immature Neutrophil % 0.6 (H) 0 - 0.43 %   CMP   Result Value Ref Range    Sodium 134 (L) 135 - 145 MMOL/L    Potassium 4.4 3.5 - 5.1 MMOL/L    Chloride 96 (L) 99 - 110 mMol/L    CO2 26 21 - 32 MMOL/L    BUN 11 6 - 23 MG/DL    Creatinine 0.9 0.9 - 1.3 MG/DL    Est, Glom Filt Rate >60 >60 mL/min/1.73m2    Glucose 419 (HH) 70 - 99 MG/DL    Calcium 9.5 8.3 - 10.6 MG/DL    Albumin 4.3 3.4 - 5.0 GM/DL    Total Protein 7.0 6.4 - 8.2 GM/DL    Total Bilirubin 0.4 0.0 - 1.0 MG/DL    ALT 21 10 - 40 U/L    AST 18 15 - 37 IU/L    Alkaline Phosphatase 121 40 - 129 IU/L    Anion Gap 12 4 - 16   Lipase   Result Value Ref Range    Lipase 601 (H) 13 - 60 IU/L   Urinalysis Result Value Ref Range    Color, UA YELLOW YELLOW    Clarity, UA CLEAR CLEAR    Glucose, Urine >1,000 (A) NEGATIVE MG/DL    Bilirubin Urine NEGATIVE NEGATIVE MG/DL    Ketones, Urine NEGATIVE NEGATIVE MG/DL    Specific Gravity, UA 1.010 1.001 - 1.035    Blood, Urine NEGATIVE NEGATIVE    pH, Urine 6.0 5.0 - 8.0    Protein, UA NEGATIVE NEGATIVE MG/DL    Urobilinogen, Urine 0.2 0.2 - 1.0 MG/DL    Nitrite Urine, Quantitative NEGATIVE NEGATIVE    Leukocyte Esterase, Urine NEGATIVE NEGATIVE   Lactic Acid   Result Value Ref Range    Lactate 1.6 0.4 - 2.0 mMOL/L   Blood Gas, Venous   Result Value Ref Range    pH, Eduard 7.30 (L) 7.32 - 7.42    pCO2, Eduard 55 (H) 38 - 52 mmHG    pO2, Eduard 47 28 - 48 mmHG    Base Excess 0 0 - 3.3    HCO3, Venous 27.1 (H) 19 - 25 MMOL/L    O2 Sat, Eduard 76.6 (H) 50 - 70 %   Beta-Hydroxybutyrate   Result Value Ref Range    Beta-Hydroxybutyrate 2.2 0.0 - 3.0 MG/DL   POCT Glucose   Result Value Ref Range    Glucose 258 mg/dL   POCT Glucose   Result Value Ref Range    POC Glucose 258 (H) 70 - 99 MG/DL       Radiographs:  CT ABDOMEN PELVIS W IV CONTRAST Additional Contrast? None    Result Date: 11/12/2022  EXAMINATION: CT OF THE ABDOMEN AND PELVIS WITH CONTRAST 11/12/2022 9:40 pm TECHNIQUE: CT of the abdomen and pelvis was performed with the administration of intravenous contrast. Multiplanar reformatted images are provided for review. Automated exposure control, iterative reconstruction, and/or weight based adjustment of the mA/kV was utilized to reduce the radiation dose to as low as reasonably achievable.  COMPARISON: 06/09/2022, 04/17/2021 HISTORY: ORDERING SYSTEM PROVIDED HISTORY: abd cramping, history burkitt's lymphoma TECHNOLOGIST PROVIDED HISTORY: Additional Contrast?->None Reason for exam:->abd cramping, history burkitt's lymphoma Decision Support Exception - unselect if not a suspected or confirmed emergency medical condition->Emergency Medical Condition ([place]) Reason for Exam: abd cramping, history burkitt's lymphoma FINDINGS: Lower Chest: No acute findings. Organs: Inflammatory changes present about the body and tail the pancreas. No pancreatic duct dilatation. Previously seen low-density lesion in the tail the pancreas is no longer appreciated. The liver, gallbladder, spleen, adrenals and kidneys reveal no acute findings. GI/Bowel: There is no bowel dilatation or wall thickening identified. Pelvis: No acute findings. Peritoneum/Retroperitoneum: No free air or free fluid. The aorta is normal in caliber. The visceral branches are patent. No lymphadenopathy. Bones/Soft Tissues: No abnormality identified. *Unless otherwise specified, incidental findings do not require dedicated imaging follow-up. Findings compatible with acute pancreatitis. ED Course and MDM   -  Patient seen and evaluated in the emergency department. -  Triage and nursing notes reviewed and incorporated. -  Old chart records reviewed and incorporated. -  Supervising physician was Dr. Merilee Nyhan. Patient was seen independently. -  Work-up included:  see above  -  ED medications:  NS, Protonix, Zofran, morphine  -  Results discussed with patient. CT shows acute pancreatitis. Lipase is 600. White count is 18,800. Initial glucose 419--improved to 258. I recommended admission to patient given this is his first episode of pancreatitis and associated hyperglycemia, which he was initially agreeable to. However, when Dr. Nadia Akers went in to patient, he informed him that he actually felt better and wanted to go home instead. He was given strict return precautions. Given clear liquid diet/hydration recommendations. Close FU with PCP this week for repeat labs, return here for new/worsening symptoms.   He is agreeable with plan of care and disposition.  -  Disposition:  Home    In light of current events, I did utilize appropriate PPE (including N95 and surgical face mask, safety glasses, and gloves, as recommended by the health facility/national standard best practice, during my bedside interactions with the patient. Final Impression      1. Acute pancreatitis, unspecified complication status, unspecified pancreatitis type    2.  Hyperglycemia          DISPOSITION Decision To Discharge 11/13/2022 01:11:33 AM      Glenda Martin PA-C   [employer]        [place]  11/13/22 0200

## 2022-11-13 NOTE — ED NOTES
Notified provider lab called with critical BS @ [address] [facility] [place], [facility]  11/12/22 2121

## 2023-02-16 ENCOUNTER — HOSPITAL ENCOUNTER (OUTPATIENT)
Dept: CT IMAGING | Age: 28
Discharge: HOME | End: 2023-02-16
Payer: COMMERCIAL

## 2023-02-16 DIAGNOSIS — C83.78 BURKITT'S TUMOR OR LYMPHOMA OF LYMPH NODES OF MULTIPLE SITES (HCC): ICD-10-CM

## 2023-02-16 PROCEDURE — 2500000003 HC RX 250 WO HCPCS: Performed by: INTERNAL MEDICINE

## 2023-02-16 PROCEDURE — 74176 CT ABD & PELVIS W/O CONTRAST: CPT

## 2023-02-16 RX ADMIN — BARIUM SULFATE 900 ML: 20 SUSPENSION ORAL at 07:40

## 2024-04-29 NOTE — PROGRESS NOTES
29-Apr-2024 08:13 tolerated the procedure well with no immediate complications. Yassine Stacy FINDINGS: Successful CT guided mesentery mass core needle biopsy as described above. Successful CT guided mesentery mass biopsy. Ct Abdomen Pelvis W Iv Contrast Additional Contrast? None    Result Date: 2/18/2021  EXAMINATION: CT OF THE ABDOMEN AND PELVIS WITH CONTRAST 2/17/2021 7:10 pm   1. Abnormal wall thickening involving the gastric antrum, duodenal C-loop, and proximal jejunum, which may be inflammatory or neoplastic in etiology. 2. Approximate 4.3 x 3.7 cm heterogeneous \"mass\", within the mesentery, with surrounding inflammation. Differential considerations would include a necrotic neoplasm, necrotic adenopathy related to lymphoma or metastatic disease, or an infectious process. An additional 2.2 x 2.1 cm mass with central low attenuation is present more caudally within the mesentery. No air bubbles are present within these collections to suggest abscesses. Multiple additional mesenteric lymph nodes are present. 3. 1.8 cm cystic lesion within the tail the pancreas, and may represent sequela of prior pancreatitis. 4. Moderate amount of free fluid present within the pelvis 5.  Surgical consultation recommended       Scheduled Medicines   Medications:    enoxaparin  40 mg Subcutaneous Daily    nicotine  1 patch Transdermal Daily    polyethylene glycol  17 g Oral Daily    magnesium hydroxide  30 mL Oral Daily    sodium chloride flush  10 mL Intravenous 2 times per day    insulin lispro  0-12 Units Subcutaneous 4x Daily WC      Infusions:    dextrose           Objective:   Vitals: /80   Pulse 85   Temp 97.6 °F (36.4 °C) (Oral)   Resp 15   Ht 6' 4\" (1.93 m)   Wt 200 lb (90.7 kg)   SpO2 95%   BMI 24.34 kg/m²   General appearance: alert and cooperative with exam  Neck: no JVD or bruit  Thyroid : Normal lobes   Lungs: Has Vesicular Breath sounds   Heart:  regular rate and rhythm  Abdomen: soft, non-tender; bowel sounds normal; no masses,  no organomegaly  Musculoskeletal: Normal  Extremities: extremities normal, , no edema  Neurologic:  Awake, alert, oriented to name, place and time. Cranial nerves II-XII are grossly intact. Motor is  intact. Sensory is intact. ,  and gait is normal.    Assessment:     Patient Active Problem List:     Mesenteric mass     Diabetes mellitus     Weight loss      Plan:     1. Reviewed POC blood glucose . Labs and X ray results   2. Reviewed Current Medicines   3. On Correction bolus Humalog/ Basal Lantus Insulin regime  4. Monitor Blood glucose frequently  5. Modified  the dose of Insulin/ other medicines as needed   6. And add advanced almost normal diet  7. Waiting for biopsy report of omental mass  8. Will follow     .      Shay Walton MD

## 2025-03-04 ENCOUNTER — TELEPHONE (OUTPATIENT)
Dept: PHARMACY | Facility: CLINIC | Age: 30
End: 2025-03-04

## 2025-03-04 NOTE — TELEPHONE ENCOUNTER
[facility]– [place]note 2[place] CLINICAL PHARMACY: ADHERENCE REVIEW  Identified care gap per [place]: fills at [facility]: Diabetes adherence      ASSESSMENT  DIABETES ADHERENCE    Insurance Records claims through 25 (Prior Year PDC = not reported; YTD PDC = FIRST FILL; Potential Fail Date: 25):   Metformin 1000mg tab last filled on 25 for 30 day supply. Next refill due: 25    Prescribed si tablet/capsule twice daily    Per Insurer Portal: last filled on 25 for 30 day supply.     Per [facility] Pharmacy: not contacted    Lab Results   Component Value Date    LABA1C 7.3 (H) 2021    LABA1C 10.8 (H) 2020     NOTE: A1c <9%    PLAN    The following are interventions that have been identified:   Patient OVERDUE refilling Metformin 1000mg tab and active on home medication list.   Patient eligible for 100 day supply of Metformin 1000mg tab    Attempting to reach patient to review - unable to leave message. Letter sent to patient.      Last Visit: none  Next Visit: none    Patrice Prabhakar [facility]   [facility]  [phone] Option 1     For Pharmacy Admin Tracking Only    Program: [employer]  CPA in place:  No  Gap Closed?: No   Time Spent (min): 10

## (undated) DEVICE — TUBING SUCT 12FR MAL ALUM SHFT FN CAP VENT UNIV CONN W/ OBT

## (undated) DEVICE — TUBING, SUCTION, 9/32" X 10', STRAIGHT: Brand: MEDLINE

## (undated) DEVICE — MARKER SURG SKIN UTIL REGULAR/FINE 2 TIP W/ RUL AND 9 LBL

## (undated) DEVICE — SHEET,T,THYROID,STERILE: Brand: MEDLINE

## (undated) DEVICE — SHEET,DRAPE,53X77,STERILE: Brand: MEDLINE

## (undated) DEVICE — CONTAINER,SPECIMEN,OR STERILE,4OZ: Brand: MEDLINE

## (undated) DEVICE — COUNTER NDL 30 COUNT FOAM STRP SGL MAG

## (undated) DEVICE — SYRINGE MED 10ML LUERLOCK TIP W/O SFTY DISP

## (undated) DEVICE — TOWEL,OR,DSP,ST,BLUE,STD,6/PK,12PK/CS: Brand: MEDLINE

## (undated) DEVICE — MINI STICK MAX COAXIAL MICROINTRODUCER KIT: Brand: ANGIODYNAMICS

## (undated) DEVICE — INTENDED FOR TISSUE SEPARATION, AND OTHER PROCEDURES THAT REQUIRE A SHARP SURGICAL BLADE TO PUNCTURE OR CUT.: Brand: BARD-PARKER ® STAINLESS STEEL BLADES

## (undated) DEVICE — GOWN,SIRUS,POLYRNF,BRTHSLV,XLN/XL,20/CS: Brand: MEDLINE

## (undated) DEVICE — SUTURE PROL SZ 2-0 L30IN NONABSORBABLE BLU L26MM SH 1/2 CIR 8833H

## (undated) DEVICE — PENCIL ES CRD L10FT HND SWCHING ROCK SWCH W/ EDGE COAT BLDE

## (undated) DEVICE — GLOVE SURG SZ 7 CRM LTX FREE POLYISOPRENE POLYMER BEAD ANTI

## (undated) DEVICE — ELECTRODE ES AD CRDLSS PT RET REM POLYHESIVE

## (undated) DEVICE — SUTURE COAT VCRL SZ 4-0 L18IN ABSRB UD L19MM PS-2 1/2 CIR J496G

## (undated) DEVICE — ADHESIVE SKIN CLSR 0.7ML TOP DERMBND ADV

## (undated) DEVICE — ELECTRODE ES L2.75IN S STL INSUL BLDE W/ SL EDGE

## (undated) DEVICE — GLOVE SURG SZ 6 THK91MIL LTX FREE SYN POLYISOPRENE ANTI

## (undated) DEVICE — PACK,BASIC,SIRUS,V: Brand: MEDLINE

## (undated) DEVICE — GLOVE ORANGE PI 8   MSG9080

## (undated) DEVICE — 3M™ IOBAN™ 2 ANTIMICROBIAL INCISE DRAPE 6650EZ: Brand: IOBAN™ 2

## (undated) DEVICE — YANKAUER,FLEXIBLE HANDLE,REGLR CAPACITY: Brand: MEDLINE INDUSTRIES, INC.

## (undated) DEVICE — DRAPE SHEET ULTRAGARD: Brand: MEDLINE

## (undated) DEVICE — GLOVE ORANGE PI 7   MSG9070

## (undated) DEVICE — GAUZE,SPONGE,4"X4",16PLY,XRAY,STRL,LF: Brand: MEDLINE

## (undated) DEVICE — APPLICATOR MEDICATED 26 CC SOLUTION HI LT ORNG CHLORAPREP

## (undated) DEVICE — COVER,C-ARM,41X74: Brand: MEDLINE

## (undated) DEVICE — BLADE CLIPPER GEN PURP NS

## (undated) DEVICE — SUTURE VCRL SZ 3-0 L27IN ABSRB UD L26MM SH 1/2 CIR J416H